# Patient Record
Sex: FEMALE | Race: OTHER | HISPANIC OR LATINO
[De-identification: names, ages, dates, MRNs, and addresses within clinical notes are randomized per-mention and may not be internally consistent; named-entity substitution may affect disease eponyms.]

---

## 2017-01-18 ENCOUNTER — APPOINTMENT (OUTPATIENT)
Dept: INTERNAL MEDICINE | Facility: CLINIC | Age: 77
End: 2017-01-18

## 2017-01-18 VITALS
TEMPERATURE: 99.4 F | HEART RATE: 83 BPM | DIASTOLIC BLOOD PRESSURE: 77 MMHG | WEIGHT: 157 LBS | BODY MASS INDEX: 32.81 KG/M2 | SYSTOLIC BLOOD PRESSURE: 115 MMHG

## 2017-01-19 ENCOUNTER — APPOINTMENT (OUTPATIENT)
Dept: RHEUMATOLOGY | Facility: CLINIC | Age: 77
End: 2017-01-19

## 2017-01-19 VITALS
BODY MASS INDEX: 32.95 KG/M2 | DIASTOLIC BLOOD PRESSURE: 72 MMHG | TEMPERATURE: 98.2 F | HEIGHT: 58 IN | HEART RATE: 88 BPM | WEIGHT: 157 LBS | SYSTOLIC BLOOD PRESSURE: 116 MMHG

## 2017-02-07 ENCOUNTER — APPOINTMENT (OUTPATIENT)
Age: 77
End: 2017-02-07

## 2017-02-07 VITALS
HEIGHT: 58 IN | TEMPERATURE: 98.3 F | WEIGHT: 153 LBS | BODY MASS INDEX: 32.12 KG/M2 | SYSTOLIC BLOOD PRESSURE: 122 MMHG | DIASTOLIC BLOOD PRESSURE: 82 MMHG | HEART RATE: 82 BPM

## 2017-02-21 ENCOUNTER — RX RENEWAL (OUTPATIENT)
Age: 77
End: 2017-02-21

## 2017-04-19 ENCOUNTER — APPOINTMENT (OUTPATIENT)
Dept: INTERNAL MEDICINE | Facility: CLINIC | Age: 77
End: 2017-04-19

## 2017-04-19 VITALS
TEMPERATURE: 98.8 F | DIASTOLIC BLOOD PRESSURE: 84 MMHG | HEART RATE: 80 BPM | WEIGHT: 156 LBS | SYSTOLIC BLOOD PRESSURE: 124 MMHG | BODY MASS INDEX: 32.6 KG/M2

## 2017-04-20 ENCOUNTER — APPOINTMENT (OUTPATIENT)
Dept: RHEUMATOLOGY | Facility: CLINIC | Age: 77
End: 2017-04-20

## 2017-04-20 VITALS
BODY MASS INDEX: 32.55 KG/M2 | WEIGHT: 155.05 LBS | RESPIRATION RATE: 16 BRPM | SYSTOLIC BLOOD PRESSURE: 120 MMHG | DIASTOLIC BLOOD PRESSURE: 84 MMHG | HEIGHT: 58 IN | HEART RATE: 78 BPM

## 2017-04-20 LAB
25(OH)D3 SERPL-MCNC: 30.2 NG/ML
ALBUMIN SERPL ELPH-MCNC: 4.3 G/DL
ALP BLD-CCNC: 73 U/L
ALT SERPL-CCNC: 21 U/L
ANION GAP SERPL CALC-SCNC: 19 MMOL/L
AST SERPL-CCNC: 21 U/L
BASOPHILS # BLD AUTO: 0.03 K/UL
BASOPHILS NFR BLD AUTO: 0.4 %
BILIRUB SERPL-MCNC: 0.3 MG/DL
BUN SERPL-MCNC: 19 MG/DL
CALCIUM SERPL-MCNC: 9.6 MG/DL
CHLORIDE SERPL-SCNC: 102 MMOL/L
CHOLEST SERPL-MCNC: 152 MG/DL
CHOLEST/HDLC SERPL: 2.9 RATIO
CO2 SERPL-SCNC: 23 MMOL/L
CREAT SERPL-MCNC: 0.68 MG/DL
EOSINOPHIL # BLD AUTO: 0.27 K/UL
EOSINOPHIL NFR BLD AUTO: 4 %
FOLATE SERPL-MCNC: 10 NG/ML
FRUCTOSAMINE SERPL-MCNC: 275 UMOL/L
GLUCOSE SERPL-MCNC: 88 MG/DL
HBA1C MFR BLD HPLC: 7.1 %
HCT VFR BLD CALC: 40.3 %
HDLC SERPL-MCNC: 53 MG/DL
HGB BLD-MCNC: 12.9 G/DL
IMM GRANULOCYTES NFR BLD AUTO: 0.1 %
LDLC SERPL CALC-MCNC: 70 MG/DL
LYMPHOCYTES # BLD AUTO: 1.56 K/UL
LYMPHOCYTES NFR BLD AUTO: 23.4 %
MAGNESIUM SERPL-MCNC: 1.6 MG/DL
MAN DIFF?: NORMAL
MCHC RBC-ENTMCNC: 28 PG
MCHC RBC-ENTMCNC: 32 GM/DL
MCV RBC AUTO: 87.4 FL
MONOCYTES # BLD AUTO: 0.48 K/UL
MONOCYTES NFR BLD AUTO: 7.2 %
NEUTROPHILS # BLD AUTO: 4.33 K/UL
NEUTROPHILS NFR BLD AUTO: 64.9 %
PLATELET # BLD AUTO: 213 K/UL
POTASSIUM SERPL-SCNC: 4.8 MMOL/L
PROT SERPL-MCNC: 7.4 G/DL
RBC # BLD: 4.61 M/UL
RBC # FLD: 13.1 %
SODIUM SERPL-SCNC: 144 MMOL/L
TRIGL SERPL-MCNC: 144 MG/DL
VIT B12 SERPL-MCNC: 466 PG/ML
WBC # FLD AUTO: 6.68 K/UL

## 2017-05-09 ENCOUNTER — APPOINTMENT (OUTPATIENT)
Age: 77
End: 2017-05-09

## 2017-05-09 VITALS
TEMPERATURE: 98.6 F | HEIGHT: 58 IN | WEIGHT: 157 LBS | RESPIRATION RATE: 16 BRPM | DIASTOLIC BLOOD PRESSURE: 78 MMHG | SYSTOLIC BLOOD PRESSURE: 120 MMHG | OXYGEN SATURATION: 95 % | BODY MASS INDEX: 32.95 KG/M2 | HEART RATE: 80 BPM

## 2017-06-06 ENCOUNTER — APPOINTMENT (OUTPATIENT)
Dept: RHEUMATOLOGY | Facility: CLINIC | Age: 77
End: 2017-06-06

## 2017-06-06 VITALS
TEMPERATURE: 98.7 F | HEART RATE: 77 BPM | DIASTOLIC BLOOD PRESSURE: 78 MMHG | OXYGEN SATURATION: 97 % | SYSTOLIC BLOOD PRESSURE: 128 MMHG | RESPIRATION RATE: 16 BRPM | HEIGHT: 58 IN | BODY MASS INDEX: 32.95 KG/M2 | WEIGHT: 157 LBS

## 2017-06-12 ENCOUNTER — APPOINTMENT (OUTPATIENT)
Dept: INTERNAL MEDICINE | Facility: CLINIC | Age: 77
End: 2017-06-12

## 2017-06-12 VITALS
DIASTOLIC BLOOD PRESSURE: 78 MMHG | HEART RATE: 87 BPM | HEIGHT: 58 IN | WEIGHT: 154 LBS | BODY MASS INDEX: 32.32 KG/M2 | TEMPERATURE: 98.3 F | SYSTOLIC BLOOD PRESSURE: 136 MMHG

## 2017-06-15 ENCOUNTER — RX RENEWAL (OUTPATIENT)
Age: 77
End: 2017-06-15

## 2017-08-15 ENCOUNTER — APPOINTMENT (OUTPATIENT)
Dept: RHEUMATOLOGY | Facility: CLINIC | Age: 77
End: 2017-08-15
Payer: MEDICAID

## 2017-08-15 VITALS
OXYGEN SATURATION: 98 % | BODY MASS INDEX: 33.17 KG/M2 | RESPIRATION RATE: 16 BRPM | DIASTOLIC BLOOD PRESSURE: 72 MMHG | WEIGHT: 158 LBS | HEIGHT: 58 IN | TEMPERATURE: 98.9 F | HEART RATE: 84 BPM | SYSTOLIC BLOOD PRESSURE: 142 MMHG

## 2017-08-15 PROCEDURE — 99214 OFFICE O/P EST MOD 30 MIN: CPT

## 2017-10-16 ENCOUNTER — APPOINTMENT (OUTPATIENT)
Dept: INTERNAL MEDICINE | Facility: CLINIC | Age: 77
End: 2017-10-16
Payer: MEDICAID

## 2017-10-16 VITALS
OXYGEN SATURATION: 98 % | DIASTOLIC BLOOD PRESSURE: 78 MMHG | BODY MASS INDEX: 32.54 KG/M2 | WEIGHT: 155 LBS | SYSTOLIC BLOOD PRESSURE: 122 MMHG | HEIGHT: 58 IN | HEART RATE: 82 BPM | TEMPERATURE: 98.8 F

## 2017-10-16 PROCEDURE — 99214 OFFICE O/P EST MOD 30 MIN: CPT

## 2017-10-16 RX ORDER — OMEPRAZOLE 10 MG/1
10 CAPSULE, DELAYED RELEASE ORAL
Qty: 30 | Refills: 1 | Status: COMPLETED | COMMUNITY
Start: 2017-01-18 | End: 2017-10-16

## 2017-10-16 RX ORDER — FAMOTIDINE 40 MG/1
40 TABLET, FILM COATED ORAL DAILY
Qty: 90 | Refills: 1 | Status: DISCONTINUED | COMMUNITY
Start: 2017-06-12 | End: 2017-10-16

## 2017-10-17 ENCOUNTER — APPOINTMENT (OUTPATIENT)
Dept: RHEUMATOLOGY | Facility: CLINIC | Age: 77
End: 2017-10-17
Payer: MEDICAID

## 2017-10-17 VITALS
TEMPERATURE: 98.9 F | BODY MASS INDEX: 32.12 KG/M2 | HEIGHT: 58 IN | HEART RATE: 83 BPM | OXYGEN SATURATION: 98 % | DIASTOLIC BLOOD PRESSURE: 68 MMHG | WEIGHT: 153 LBS | SYSTOLIC BLOOD PRESSURE: 124 MMHG | RESPIRATION RATE: 18 BRPM

## 2017-10-17 PROCEDURE — 99213 OFFICE O/P EST LOW 20 MIN: CPT

## 2017-12-13 ENCOUNTER — APPOINTMENT (OUTPATIENT)
Age: 77
End: 2017-12-13
Payer: MEDICAID

## 2017-12-13 VITALS
SYSTOLIC BLOOD PRESSURE: 130 MMHG | HEIGHT: 58 IN | TEMPERATURE: 98.6 F | HEART RATE: 85 BPM | DIASTOLIC BLOOD PRESSURE: 76 MMHG | WEIGHT: 154 LBS | BODY MASS INDEX: 32.32 KG/M2 | OXYGEN SATURATION: 98 %

## 2017-12-13 PROCEDURE — 99213 OFFICE O/P EST LOW 20 MIN: CPT

## 2017-12-14 LAB
ALBUMIN SERPL ELPH-MCNC: 4.5 G/DL
ALP BLD-CCNC: 91 U/L
ALT SERPL-CCNC: 19 U/L
AST SERPL-CCNC: 20 U/L
BILIRUB DIRECT SERPL-MCNC: 0.1 MG/DL
BILIRUB INDIRECT SERPL-MCNC: 0.3 MG/DL
BILIRUB SERPL-MCNC: 0.4 MG/DL
PROT SERPL-MCNC: 7.4 G/DL

## 2017-12-19 ENCOUNTER — APPOINTMENT (OUTPATIENT)
Dept: RHEUMATOLOGY | Facility: CLINIC | Age: 77
End: 2017-12-19
Payer: MEDICAID

## 2017-12-19 VITALS
RESPIRATION RATE: 16 BRPM | HEIGHT: 58 IN | SYSTOLIC BLOOD PRESSURE: 134 MMHG | HEART RATE: 72 BPM | DIASTOLIC BLOOD PRESSURE: 76 MMHG | WEIGHT: 155 LBS | BODY MASS INDEX: 32.54 KG/M2 | OXYGEN SATURATION: 98 % | TEMPERATURE: 97.6 F

## 2017-12-19 PROCEDURE — 99214 OFFICE O/P EST MOD 30 MIN: CPT

## 2018-01-08 ENCOUNTER — APPOINTMENT (OUTPATIENT)
Age: 78
End: 2018-01-08
Payer: MEDICAID

## 2018-01-08 PROCEDURE — 91200 LIVER ELASTOGRAPHY: CPT

## 2018-01-24 ENCOUNTER — APPOINTMENT (OUTPATIENT)
Dept: INTERNAL MEDICINE | Facility: CLINIC | Age: 78
End: 2018-01-24
Payer: MEDICAID

## 2018-01-24 VITALS
RESPIRATION RATE: 16 BRPM | TEMPERATURE: 98.2 F | DIASTOLIC BLOOD PRESSURE: 80 MMHG | HEART RATE: 98 BPM | OXYGEN SATURATION: 98 % | HEIGHT: 67 IN | WEIGHT: 158 LBS | SYSTOLIC BLOOD PRESSURE: 120 MMHG | BODY MASS INDEX: 24.8 KG/M2

## 2018-01-24 PROCEDURE — 99214 OFFICE O/P EST MOD 30 MIN: CPT

## 2018-03-14 ENCOUNTER — APPOINTMENT (OUTPATIENT)
Age: 78
End: 2018-03-14
Payer: MEDICAID

## 2018-03-14 VITALS
WEIGHT: 153 LBS | BODY MASS INDEX: 32.12 KG/M2 | DIASTOLIC BLOOD PRESSURE: 80 MMHG | RESPIRATION RATE: 16 BRPM | HEART RATE: 88 BPM | TEMPERATURE: 98.7 F | OXYGEN SATURATION: 98 % | HEIGHT: 58 IN | SYSTOLIC BLOOD PRESSURE: 132 MMHG

## 2018-03-14 LAB
BASOPHILS # BLD AUTO: 0.04 K/UL
BASOPHILS NFR BLD AUTO: 0.6 %
EOSINOPHIL # BLD AUTO: 0.23 K/UL
EOSINOPHIL NFR BLD AUTO: 3.3 %
HCT VFR BLD CALC: 43.5 %
HGB BLD-MCNC: 14.4 G/DL
IMM GRANULOCYTES NFR BLD AUTO: 0.3 %
LYMPHOCYTES # BLD AUTO: 1.99 K/UL
LYMPHOCYTES NFR BLD AUTO: 28.8 %
MAN DIFF?: NORMAL
MCHC RBC-ENTMCNC: 28.7 PG
MCHC RBC-ENTMCNC: 33.1 GM/DL
MCV RBC AUTO: 86.8 FL
MONOCYTES # BLD AUTO: 0.53 K/UL
MONOCYTES NFR BLD AUTO: 7.7 %
NEUTROPHILS # BLD AUTO: 4.1 K/UL
NEUTROPHILS NFR BLD AUTO: 59.3 %
PLATELET # BLD AUTO: 219 K/UL
RBC # BLD: 5.01 M/UL
RBC # FLD: 13.1 %
WBC # FLD AUTO: 6.91 K/UL

## 2018-03-14 PROCEDURE — 99214 OFFICE O/P EST MOD 30 MIN: CPT

## 2018-03-15 LAB
AFP-TM SERPL-MCNC: 2.5 NG/ML
ALBUMIN SERPL ELPH-MCNC: 4.7 G/DL
ALP BLD-CCNC: 106 U/L
ALT SERPL-CCNC: 19 U/L
ANION GAP SERPL CALC-SCNC: 16 MMOL/L
AST SERPL-CCNC: 18 U/L
BILIRUB SERPL-MCNC: 0.4 MG/DL
BUN SERPL-MCNC: 21 MG/DL
CALCIUM SERPL-MCNC: 10.5 MG/DL
CHLORIDE SERPL-SCNC: 100 MMOL/L
CO2 SERPL-SCNC: 28 MMOL/L
CREAT SERPL-MCNC: 0.77 MG/DL
DEPRECATED KAPPA LC FREE/LAMBDA SER: 0.96 RATIO
GLUCOSE SERPL-MCNC: 127 MG/DL
IGA SER QL IEP: 396 MG/DL
IGG SER QL IEP: 1010 MG/DL
IGM SER QL IEP: 34 MG/DL
KAPPA LC CSF-MCNC: 1.81 MG/DL
KAPPA LC SERPL-MCNC: 1.74 MG/DL
POTASSIUM SERPL-SCNC: 4.6 MMOL/L
PROT SERPL-MCNC: 7.7 G/DL
SODIUM SERPL-SCNC: 144 MMOL/L

## 2018-03-16 LAB
ANA PAT FLD IF-IMP: ABNORMAL
ANA SER IF-ACNC: ABNORMAL
SMOOTH MUSCLE AB SER QL IF: ABNORMAL

## 2018-03-20 ENCOUNTER — APPOINTMENT (OUTPATIENT)
Dept: RHEUMATOLOGY | Facility: CLINIC | Age: 78
End: 2018-03-20
Payer: MEDICAID

## 2018-03-20 VITALS
DIASTOLIC BLOOD PRESSURE: 76 MMHG | RESPIRATION RATE: 16 BRPM | SYSTOLIC BLOOD PRESSURE: 110 MMHG | HEIGHT: 58 IN | BODY MASS INDEX: 31.91 KG/M2 | WEIGHT: 152 LBS | HEART RATE: 69 BPM | TEMPERATURE: 98.6 F | OXYGEN SATURATION: 97 %

## 2018-03-20 DIAGNOSIS — M54.9 DORSALGIA, UNSPECIFIED: ICD-10-CM

## 2018-03-20 PROCEDURE — 99214 OFFICE O/P EST MOD 30 MIN: CPT

## 2018-05-22 ENCOUNTER — APPOINTMENT (OUTPATIENT)
Dept: RHEUMATOLOGY | Facility: CLINIC | Age: 78
End: 2018-05-22

## 2018-06-18 ENCOUNTER — RX RENEWAL (OUTPATIENT)
Age: 78
End: 2018-06-18

## 2018-07-05 ENCOUNTER — APPOINTMENT (OUTPATIENT)
Dept: RHEUMATOLOGY | Facility: CLINIC | Age: 78
End: 2018-07-05
Payer: MEDICAID

## 2018-07-05 VITALS
OXYGEN SATURATION: 96 % | WEIGHT: 152 LBS | SYSTOLIC BLOOD PRESSURE: 124 MMHG | RESPIRATION RATE: 16 BRPM | DIASTOLIC BLOOD PRESSURE: 80 MMHG | HEART RATE: 83 BPM | BODY MASS INDEX: 31.91 KG/M2 | HEIGHT: 58 IN

## 2018-07-05 PROCEDURE — 99214 OFFICE O/P EST MOD 30 MIN: CPT | Mod: 25

## 2018-07-05 PROCEDURE — 20605 DRAIN/INJ JOINT/BURSA W/O US: CPT | Mod: RT

## 2018-07-05 RX ORDER — LIDOCAINE HYDROCHLORIDE 10 MG/ML
1 INJECTION, SOLUTION INFILTRATION; PERINEURAL
Qty: 0 | Refills: 0 | Status: COMPLETED | OUTPATIENT
Start: 2018-07-05

## 2018-07-05 RX ORDER — METHYLPRED ACET/NACL,ISO-OS/PF 40 MG/ML
40 VIAL (ML) INJECTION
Qty: 1 | Refills: 0 | Status: COMPLETED | OUTPATIENT
Start: 2018-07-05

## 2018-07-05 RX ADMIN — Medication 0 %: at 00:00

## 2018-07-05 RX ADMIN — METHYLPREDNISOLONE ACETATE 0 MG/ML: 40 INJECTION, SUSPENSION INTRA-ARTICULAR; INTRALESIONAL; INTRAMUSCULAR; SOFT TISSUE at 00:00

## 2018-07-24 ENCOUNTER — RX RENEWAL (OUTPATIENT)
Age: 78
End: 2018-07-24

## 2018-07-24 ENCOUNTER — APPOINTMENT (OUTPATIENT)
Dept: INTERNAL MEDICINE | Facility: CLINIC | Age: 78
End: 2018-07-24
Payer: MEDICAID

## 2018-07-24 VITALS
BODY MASS INDEX: 32.19 KG/M2 | HEART RATE: 92 BPM | TEMPERATURE: 98.3 F | DIASTOLIC BLOOD PRESSURE: 74 MMHG | WEIGHT: 154 LBS | SYSTOLIC BLOOD PRESSURE: 138 MMHG | OXYGEN SATURATION: 96 %

## 2018-07-24 DIAGNOSIS — K64.8 OTHER HEMORRHOIDS: ICD-10-CM

## 2018-07-24 PROCEDURE — 99214 OFFICE O/P EST MOD 30 MIN: CPT | Mod: 25

## 2018-07-24 PROCEDURE — 82270 OCCULT BLOOD FECES: CPT

## 2018-08-01 ENCOUNTER — MOBILE ON CALL (OUTPATIENT)
Age: 78
End: 2018-08-01

## 2018-08-02 ENCOUNTER — RX RENEWAL (OUTPATIENT)
Age: 78
End: 2018-08-02

## 2018-08-13 ENCOUNTER — MOBILE ON CALL (OUTPATIENT)
Age: 78
End: 2018-08-13

## 2018-08-14 ENCOUNTER — APPOINTMENT (OUTPATIENT)
Dept: RHEUMATOLOGY | Facility: CLINIC | Age: 78
End: 2018-08-14
Payer: MEDICAID

## 2018-08-14 VITALS
SYSTOLIC BLOOD PRESSURE: 132 MMHG | TEMPERATURE: 98.6 F | HEART RATE: 82 BPM | BODY MASS INDEX: 39.25 KG/M2 | OXYGEN SATURATION: 98 % | RESPIRATION RATE: 16 BRPM | HEIGHT: 58 IN | WEIGHT: 187 LBS | DIASTOLIC BLOOD PRESSURE: 82 MMHG

## 2018-08-14 PROCEDURE — 20610 DRAIN/INJ JOINT/BURSA W/O US: CPT | Mod: RT

## 2018-08-14 PROCEDURE — 99213 OFFICE O/P EST LOW 20 MIN: CPT | Mod: 25

## 2018-08-21 ENCOUNTER — APPOINTMENT (OUTPATIENT)
Dept: RHEUMATOLOGY | Facility: CLINIC | Age: 78
End: 2018-08-21
Payer: MEDICAID

## 2018-08-21 VITALS
DIASTOLIC BLOOD PRESSURE: 75 MMHG | HEART RATE: 83 BPM | HEIGHT: 58 IN | RESPIRATION RATE: 16 BRPM | TEMPERATURE: 98.4 F | WEIGHT: 148 LBS | OXYGEN SATURATION: 98 % | BODY MASS INDEX: 31.07 KG/M2 | SYSTOLIC BLOOD PRESSURE: 116 MMHG

## 2018-08-21 DIAGNOSIS — S16.1XXA STRAIN OF MUSCLE, FASCIA AND TENDON AT NECK LEVEL, INITIAL ENCOUNTER: ICD-10-CM

## 2018-08-21 PROCEDURE — 99213 OFFICE O/P EST LOW 20 MIN: CPT

## 2018-09-04 ENCOUNTER — CHART COPY (OUTPATIENT)
Age: 78
End: 2018-09-04

## 2018-09-04 ENCOUNTER — APPOINTMENT (OUTPATIENT)
Dept: RHEUMATOLOGY | Facility: CLINIC | Age: 78
End: 2018-09-04
Payer: MEDICAID

## 2018-09-04 VITALS
BODY MASS INDEX: 30.64 KG/M2 | HEART RATE: 89 BPM | TEMPERATURE: 99.2 F | OXYGEN SATURATION: 96 % | DIASTOLIC BLOOD PRESSURE: 74 MMHG | WEIGHT: 146 LBS | SYSTOLIC BLOOD PRESSURE: 119 MMHG | RESPIRATION RATE: 16 BRPM | HEIGHT: 58 IN

## 2018-09-04 DIAGNOSIS — M14.60 CHARCOT'S JOINT, UNSPECIFIED SITE: ICD-10-CM

## 2018-09-04 PROCEDURE — 99214 OFFICE O/P EST MOD 30 MIN: CPT

## 2018-09-05 ENCOUNTER — APPOINTMENT (OUTPATIENT)
Dept: INTERNAL MEDICINE | Facility: CLINIC | Age: 78
End: 2018-09-05
Payer: MEDICAID

## 2018-09-05 VITALS
DIASTOLIC BLOOD PRESSURE: 78 MMHG | HEART RATE: 85 BPM | WEIGHT: 146 LBS | TEMPERATURE: 98 F | SYSTOLIC BLOOD PRESSURE: 130 MMHG | HEIGHT: 58 IN | BODY MASS INDEX: 30.64 KG/M2 | OXYGEN SATURATION: 98 %

## 2018-09-05 LAB
CREAT SPEC-SCNC: 90 MG/DL
FRUCTOSAMINE SERPL-MCNC: 323 UMOL/L
HBA1C MFR BLD HPLC: 9.2 %
MICROALBUMIN 24H UR DL<=1MG/L-MCNC: <1.2 MG/DL
MICROALBUMIN/CREAT 24H UR-RTO: NORMAL

## 2018-09-05 PROCEDURE — 99215 OFFICE O/P EST HI 40 MIN: CPT | Mod: 25

## 2018-09-05 PROCEDURE — 93000 ELECTROCARDIOGRAM COMPLETE: CPT

## 2018-09-05 RX ORDER — HYDROCORTISONE 2.5% 25 MG/G
2.5 CREAM TOPICAL 3 TIMES DAILY
Qty: 1 | Refills: 1 | Status: COMPLETED | COMMUNITY
Start: 2018-07-24 | End: 2018-09-05

## 2018-09-05 RX ORDER — LIDOCAINE 4 %
4 ADHESIVE PATCH, MEDICATED TOPICAL
Qty: 30 | Refills: 1 | Status: COMPLETED | COMMUNITY
Start: 2017-06-06 | End: 2018-09-05

## 2018-09-05 RX ORDER — CETIRIZINE HYDROCHLORIDE 10 MG/1
10 TABLET, COATED ORAL
Qty: 30 | Refills: 2 | Status: COMPLETED | COMMUNITY
Start: 2018-09-04 | End: 2018-09-05

## 2018-09-12 ENCOUNTER — APPOINTMENT (OUTPATIENT)
Dept: HEPATOLOGY | Facility: CLINIC | Age: 78
End: 2018-09-12
Payer: MEDICAID

## 2018-09-12 VITALS
OXYGEN SATURATION: 98 % | WEIGHT: 147 LBS | TEMPERATURE: 99.2 F | SYSTOLIC BLOOD PRESSURE: 151 MMHG | HEART RATE: 81 BPM | DIASTOLIC BLOOD PRESSURE: 81 MMHG | BODY MASS INDEX: 30.86 KG/M2 | RESPIRATION RATE: 16 BRPM | HEIGHT: 58 IN

## 2018-09-12 PROCEDURE — 99213 OFFICE O/P EST LOW 20 MIN: CPT

## 2018-09-13 ENCOUNTER — APPOINTMENT (OUTPATIENT)
Dept: RHEUMATOLOGY | Facility: CLINIC | Age: 78
End: 2018-09-13

## 2018-09-14 ENCOUNTER — APPOINTMENT (OUTPATIENT)
Dept: INTERNAL MEDICINE | Facility: HOSPITAL | Age: 78
End: 2018-09-14

## 2018-09-14 ENCOUNTER — OUTPATIENT (OUTPATIENT)
Dept: OUTPATIENT SERVICES | Facility: HOSPITAL | Age: 78
LOS: 1 days | End: 2018-09-14
Payer: MEDICAID

## 2018-09-14 ENCOUNTER — RESULT REVIEW (OUTPATIENT)
Age: 78
End: 2018-09-14

## 2018-09-14 DIAGNOSIS — K21.9 GASTRO-ESOPHAGEAL REFLUX DISEASE WITHOUT ESOPHAGITIS: ICD-10-CM

## 2018-09-14 DIAGNOSIS — K52.9 NONINFECTIVE GASTROENTERITIS AND COLITIS, UNSPECIFIED: ICD-10-CM

## 2018-09-14 DIAGNOSIS — Z90.710 ACQUIRED ABSENCE OF BOTH CERVIX AND UTERUS: Chronic | ICD-10-CM

## 2018-09-14 DIAGNOSIS — K59.00 CONSTIPATION, UNSPECIFIED: ICD-10-CM

## 2018-09-14 PROCEDURE — 88312 SPECIAL STAINS GROUP 1: CPT

## 2018-09-14 PROCEDURE — 43239 EGD BIOPSY SINGLE/MULTIPLE: CPT

## 2018-09-14 PROCEDURE — 45378 DIAGNOSTIC COLONOSCOPY: CPT

## 2018-09-14 PROCEDURE — 88312 SPECIAL STAINS GROUP 1: CPT | Mod: 26

## 2018-09-14 PROCEDURE — G0105: CPT

## 2018-09-14 PROCEDURE — 88305 TISSUE EXAM BY PATHOLOGIST: CPT | Mod: 26

## 2018-09-14 PROCEDURE — 88305 TISSUE EXAM BY PATHOLOGIST: CPT

## 2018-10-02 ENCOUNTER — APPOINTMENT (OUTPATIENT)
Dept: RHEUMATOLOGY | Facility: CLINIC | Age: 78
End: 2018-10-02
Payer: MEDICAID

## 2018-10-02 VITALS
HEART RATE: 90 BPM | DIASTOLIC BLOOD PRESSURE: 78 MMHG | RESPIRATION RATE: 16 BRPM | BODY MASS INDEX: 30.23 KG/M2 | TEMPERATURE: 98.4 F | HEIGHT: 58 IN | WEIGHT: 144 LBS | SYSTOLIC BLOOD PRESSURE: 113 MMHG | OXYGEN SATURATION: 98 %

## 2018-10-02 PROCEDURE — 99214 OFFICE O/P EST MOD 30 MIN: CPT | Mod: 25

## 2018-10-02 PROCEDURE — 20550 NJX 1 TENDON SHEATH/LIGAMENT: CPT | Mod: RT

## 2018-10-02 RX ORDER — METHYLPRED ACET/NACL,ISO-OS/PF 40 MG/ML
40 VIAL (ML) INJECTION
Qty: 1 | Refills: 0 | Status: COMPLETED | OUTPATIENT
Start: 2018-10-02

## 2018-10-02 RX ORDER — LIDOCAINE HYDROCHLORIDE 10 MG/ML
1 INJECTION, SOLUTION INFILTRATION; PERINEURAL
Qty: 0 | Refills: 0 | Status: COMPLETED | OUTPATIENT
Start: 2018-10-02

## 2018-10-02 RX ADMIN — Medication 0 MG/ML: at 00:00

## 2018-10-22 ENCOUNTER — RX RENEWAL (OUTPATIENT)
Age: 78
End: 2018-10-22

## 2018-11-07 ENCOUNTER — APPOINTMENT (OUTPATIENT)
Dept: RHEUMATOLOGY | Facility: CLINIC | Age: 78
End: 2018-11-07
Payer: MEDICAID

## 2018-11-07 VITALS
TEMPERATURE: 98.9 F | BODY MASS INDEX: 29.39 KG/M2 | RESPIRATION RATE: 16 BRPM | HEIGHT: 58 IN | OXYGEN SATURATION: 98 % | WEIGHT: 140 LBS | DIASTOLIC BLOOD PRESSURE: 86 MMHG | HEART RATE: 97 BPM | SYSTOLIC BLOOD PRESSURE: 121 MMHG

## 2018-11-07 PROCEDURE — 99214 OFFICE O/P EST MOD 30 MIN: CPT

## 2018-11-12 ENCOUNTER — APPOINTMENT (OUTPATIENT)
Dept: ORTHOPEDIC SURGERY | Facility: CLINIC | Age: 78
End: 2018-11-12
Payer: MEDICAID

## 2018-11-12 VITALS
HEART RATE: 88 BPM | DIASTOLIC BLOOD PRESSURE: 78 MMHG | HEIGHT: 58 IN | SYSTOLIC BLOOD PRESSURE: 118 MMHG | BODY MASS INDEX: 29.39 KG/M2 | WEIGHT: 140 LBS

## 2018-11-12 PROCEDURE — 99203 OFFICE O/P NEW LOW 30 MIN: CPT

## 2018-11-12 PROCEDURE — 73030 X-RAY EXAM OF SHOULDER: CPT | Mod: RT

## 2018-11-13 ENCOUNTER — APPOINTMENT (OUTPATIENT)
Dept: INTERNAL MEDICINE | Facility: CLINIC | Age: 78
End: 2018-11-13
Payer: MEDICAID

## 2018-11-13 VITALS
HEART RATE: 82 BPM | OXYGEN SATURATION: 98 % | DIASTOLIC BLOOD PRESSURE: 74 MMHG | HEIGHT: 58 IN | TEMPERATURE: 98.4 F | SYSTOLIC BLOOD PRESSURE: 122 MMHG | BODY MASS INDEX: 30.23 KG/M2 | WEIGHT: 144 LBS

## 2018-11-13 DIAGNOSIS — Z12.11 ENCOUNTER FOR SCREENING FOR MALIGNANT NEOPLASM OF COLON: ICD-10-CM

## 2018-11-13 PROCEDURE — 99214 OFFICE O/P EST MOD 30 MIN: CPT

## 2018-11-13 RX ORDER — POLYETHYLENE GLYCOL 3350 17 G/17G
17 POWDER, FOR SOLUTION ORAL
Qty: 1 | Refills: 0 | Status: COMPLETED | COMMUNITY
Start: 2018-09-05 | End: 2018-11-13

## 2018-11-13 RX ORDER — BISACODYL 5 MG/1
5 TABLET, COATED ORAL
Qty: 4 | Refills: 0 | Status: COMPLETED | COMMUNITY
Start: 2018-09-05 | End: 2018-11-13

## 2018-11-13 RX ORDER — WITCH HAZEL 50 %
50 PADS, MEDICATED (EA) TOPICAL
Qty: 1 | Refills: 2 | Status: COMPLETED | COMMUNITY
Start: 2018-07-24 | End: 2018-11-13

## 2018-11-16 ENCOUNTER — APPOINTMENT (OUTPATIENT)
Dept: ORTHOPEDIC SURGERY | Facility: CLINIC | Age: 78
End: 2018-11-16
Payer: MEDICAID

## 2018-11-16 PROCEDURE — 99213 OFFICE O/P EST LOW 20 MIN: CPT

## 2018-11-21 ENCOUNTER — APPOINTMENT (OUTPATIENT)
Dept: ORTHOPEDIC SURGERY | Facility: CLINIC | Age: 78
End: 2018-11-21
Payer: MEDICAID

## 2018-11-21 PROCEDURE — 99214 OFFICE O/P EST MOD 30 MIN: CPT

## 2018-11-23 LAB
ALBUMIN SERPL ELPH-MCNC: 3.8 G/DL
ALP BLD-CCNC: 80 U/L
ALT SERPL-CCNC: 10 U/L
AST SERPL-CCNC: 17 U/L
BILIRUB DIRECT SERPL-MCNC: 0.1 MG/DL
BILIRUB INDIRECT SERPL-MCNC: 0.2 MG/DL
BILIRUB SERPL-MCNC: 0.2 MG/DL
DEPRECATED KAPPA LC FREE/LAMBDA SER: 1.09 RATIO
IGA SER QL IEP: 410 MG/DL
IGG SER QL IEP: 1035 MG/DL
IGM SER QL IEP: 33 MG/DL
KAPPA LC CSF-MCNC: 1.94 MG/DL
KAPPA LC SERPL-MCNC: 2.11 MG/DL
PROT SERPL-MCNC: 7.2 G/DL

## 2018-12-05 ENCOUNTER — APPOINTMENT (OUTPATIENT)
Dept: CARDIOLOGY | Facility: CLINIC | Age: 78
End: 2018-12-05
Payer: SELF-PAY

## 2018-12-05 ENCOUNTER — APPOINTMENT (OUTPATIENT)
Dept: ORTHOPEDIC SURGERY | Facility: CLINIC | Age: 78
End: 2018-12-05
Payer: SELF-PAY

## 2018-12-05 VITALS
DIASTOLIC BLOOD PRESSURE: 69 MMHG | SYSTOLIC BLOOD PRESSURE: 133 MMHG | BODY MASS INDEX: 30.23 KG/M2 | HEART RATE: 93 BPM | OXYGEN SATURATION: 98 % | HEIGHT: 58 IN | TEMPERATURE: 98.1 F | WEIGHT: 144 LBS

## 2018-12-05 DIAGNOSIS — R01.1 CARDIAC MURMUR, UNSPECIFIED: ICD-10-CM

## 2018-12-05 PROCEDURE — 99213 OFFICE O/P EST LOW 20 MIN: CPT

## 2018-12-05 PROCEDURE — 99204 OFFICE O/P NEW MOD 45 MIN: CPT

## 2018-12-05 RX ORDER — DOCUSATE SODIUM 100 MG/1
100 CAPSULE, LIQUID FILLED ORAL TWICE DAILY
Qty: 50 | Refills: 0 | Status: COMPLETED | COMMUNITY
Start: 2018-12-05 | End: 2018-12-30

## 2018-12-05 NOTE — HISTORY OF PRESENT ILLNESS
[FreeTextEntry1] : 79 yo Khmer-speaking F with diverticulosis, DM, and HTN who presents for preprocedural evaluation prior to rotator cuff surgery. Patient reports she is able to go up a flight of stairs without any chest pain, dyspnea, palpitations, lightheadedness, or syncope. she denies any dyspnea, orthopnea, or PND. Reports compliance with medications, denies adverse effects. \par

## 2018-12-05 NOTE — ASSESSMENT
[FreeTextEntry1] : 77 yo F with DM and HTN who presents for preprocedural evaluation prior to rotator cuff surgery. She is euvolemic on exam, and able to perform > 4 METs of exertion without symptoms. \par \par 1. Murmur: Likely benign given absence of exertional symptoms or concerning EKG findings; will send for echocardiogram. \par \par 2. HTN: Adequately controlled on losartan\par \par 3. HLD: On simvastatin\par \par 4. Preprocedural evaluation: Patient's Revised Cardiac Risk Index (RCRI) score is 0 (0.4 % risk) and Molina score is %0.76 risk. Patient is at intermediate risk of  adverse perioperative cardiac events undergoing intermediate risk surgery. No further cardiac testing is needed prior to patient's surgery. Do not expect echo results to change above assessment. \par

## 2018-12-06 ENCOUNTER — OUTPATIENT (OUTPATIENT)
Dept: OUTPATIENT SERVICES | Facility: HOSPITAL | Age: 78
LOS: 1 days | End: 2018-12-06
Payer: MEDICAID

## 2018-12-06 VITALS
RESPIRATION RATE: 18 BRPM | HEIGHT: 60 IN | OXYGEN SATURATION: 100 % | DIASTOLIC BLOOD PRESSURE: 85 MMHG | SYSTOLIC BLOOD PRESSURE: 141 MMHG | WEIGHT: 143.08 LBS | HEART RATE: 90 BPM

## 2018-12-06 DIAGNOSIS — M75.21 BICIPITAL TENDINITIS, RIGHT SHOULDER: ICD-10-CM

## 2018-12-06 DIAGNOSIS — M75.01 ADHESIVE CAPSULITIS OF RIGHT SHOULDER: ICD-10-CM

## 2018-12-06 DIAGNOSIS — M67.919 UNSPECIFIED DISORDER OF SYNOVIUM AND TENDON, UNSPECIFIED SHOULDER: ICD-10-CM

## 2018-12-06 DIAGNOSIS — Z98.890 OTHER SPECIFIED POSTPROCEDURAL STATES: Chronic | ICD-10-CM

## 2018-12-06 DIAGNOSIS — Z98.41 CATARACT EXTRACTION STATUS, RIGHT EYE: Chronic | ICD-10-CM

## 2018-12-06 DIAGNOSIS — M75.121 COMPLETE ROTATOR CUFF TEAR OR RUPTURE OF RIGHT SHOULDER, NOT SPECIFIED AS TRAUMATIC: ICD-10-CM

## 2018-12-06 DIAGNOSIS — Z90.710 ACQUIRED ABSENCE OF BOTH CERVIX AND UTERUS: Chronic | ICD-10-CM

## 2018-12-06 DIAGNOSIS — Z01.818 ENCOUNTER FOR OTHER PREPROCEDURAL EXAMINATION: ICD-10-CM

## 2018-12-06 DIAGNOSIS — M19.019 PRIMARY OSTEOARTHRITIS, UNSPECIFIED SHOULDER: ICD-10-CM

## 2018-12-06 LAB
ANION GAP SERPL CALC-SCNC: 8 MMOL/L — SIGNIFICANT CHANGE UP (ref 5–17)
APTT BLD: 33 SEC — SIGNIFICANT CHANGE UP (ref 27.5–36.3)
BUN SERPL-MCNC: 19 MG/DL — HIGH (ref 7–18)
CALCIUM SERPL-MCNC: 9.2 MG/DL — SIGNIFICANT CHANGE UP (ref 8.4–10.5)
CHLORIDE SERPL-SCNC: 104 MMOL/L — SIGNIFICANT CHANGE UP (ref 96–108)
CO2 SERPL-SCNC: 28 MMOL/L — SIGNIFICANT CHANGE UP (ref 22–31)
CREAT SERPL-MCNC: 0.62 MG/DL — SIGNIFICANT CHANGE UP (ref 0.5–1.3)
GLUCOSE SERPL-MCNC: 130 MG/DL — HIGH (ref 70–99)
HCT VFR BLD CALC: 41 % — SIGNIFICANT CHANGE UP (ref 34.5–45)
HGB BLD-MCNC: 12.8 G/DL — SIGNIFICANT CHANGE UP (ref 11.5–15.5)
INR BLD: 1.19 RATIO — HIGH (ref 0.88–1.16)
MCHC RBC-ENTMCNC: 26.6 PG — LOW (ref 27–34)
MCHC RBC-ENTMCNC: 31.2 GM/DL — LOW (ref 32–36)
MCV RBC AUTO: 85.4 FL — SIGNIFICANT CHANGE UP (ref 80–100)
PLATELET # BLD AUTO: 293 K/UL — SIGNIFICANT CHANGE UP (ref 150–400)
POTASSIUM SERPL-MCNC: 4 MMOL/L — SIGNIFICANT CHANGE UP (ref 3.5–5.3)
POTASSIUM SERPL-SCNC: 4 MMOL/L — SIGNIFICANT CHANGE UP (ref 3.5–5.3)
PROTHROM AB SERPL-ACNC: 13.3 SEC — HIGH (ref 10–12.9)
RBC # BLD: 4.8 M/UL — SIGNIFICANT CHANGE UP (ref 3.8–5.2)
RBC # FLD: 13.5 % — SIGNIFICANT CHANGE UP (ref 10.3–14.5)
SODIUM SERPL-SCNC: 140 MMOL/L — SIGNIFICANT CHANGE UP (ref 135–145)
WBC # BLD: 7.2 K/UL — SIGNIFICANT CHANGE UP (ref 3.8–10.5)
WBC # FLD AUTO: 7.2 K/UL — SIGNIFICANT CHANGE UP (ref 3.8–10.5)

## 2018-12-06 PROCEDURE — 85730 THROMBOPLASTIN TIME PARTIAL: CPT

## 2018-12-06 PROCEDURE — 85610 PROTHROMBIN TIME: CPT

## 2018-12-06 PROCEDURE — 71045 X-RAY EXAM CHEST 1 VIEW: CPT

## 2018-12-06 PROCEDURE — G0463: CPT

## 2018-12-06 PROCEDURE — 80048 BASIC METABOLIC PNL TOTAL CA: CPT

## 2018-12-06 PROCEDURE — 93005 ELECTROCARDIOGRAM TRACING: CPT

## 2018-12-06 PROCEDURE — 85027 COMPLETE CBC AUTOMATED: CPT

## 2018-12-06 PROCEDURE — 71045 X-RAY EXAM CHEST 1 VIEW: CPT | Mod: 26

## 2018-12-06 PROCEDURE — 93010 ELECTROCARDIOGRAM REPORT: CPT

## 2018-12-06 NOTE — H&P PST ADULT - ASSESSMENT
77 y/o female with h/o HTN, and DM and right rotator cuff tear is scheduled for  Right shoulder arthroscopy and right shoulder cuff repair on 12/13/18.

## 2018-12-06 NOTE — H&P PST ADULT - NEGATIVE CARDIOVASCULAR SYMPTOMS
no chest pain/no palpitations/no dyspnea on exertion/no paroxysmal nocturnal dyspnea/no peripheral edema/no orthopnea

## 2018-12-06 NOTE — H&P PST ADULT - HISTORY OF PRESENT ILLNESS
77 y/o female presented with pain on the right shoulder for three months. I had Xray done for my neck and shoulder and an MRI and it shows that there is a torn tendon and I was taking pain medication Tylenol. Pt had also physical therapy and after 5 sessions, she was told that she need surgery. She is scheduled for Right shoulder arthroscopy and right shoulder cuff repair on 12/13/18

## 2018-12-06 NOTE — H&P PST ADULT - RS GEN PE MLT RESP DETAILS PC
good air movement/clear to auscultation bilaterally/normal/airway patent/breath sounds equal/respirations non-labored

## 2018-12-06 NOTE — H&P PST ADULT - PSH
H/O endoscopy    History of cataract surgery, right    S/P hysterectomy    Status post wrist surgery  Left

## 2018-12-06 NOTE — H&P PST ADULT - NSANTHOSAYNRD_GEN_A_CORE
No. MARILU screening performed.  STOP BANG Legend: 0-2 = LOW Risk; 3-4 = INTERMEDIATE Risk; 5-8 = HIGH Risk

## 2018-12-13 ENCOUNTER — APPOINTMENT (OUTPATIENT)
Dept: ORTHOPEDIC SURGERY | Facility: HOSPITAL | Age: 78
End: 2018-12-13

## 2018-12-13 ENCOUNTER — RX RENEWAL (OUTPATIENT)
Age: 78
End: 2018-12-13

## 2018-12-13 PROBLEM — M67.919 UNSPECIFIED DISORDER OF SYNOVIUM AND TENDON, UNSPECIFIED SHOULDER: Chronic | Status: ACTIVE | Noted: 2018-12-06

## 2018-12-13 PROBLEM — K29.70 GASTRITIS, UNSPECIFIED, WITHOUT BLEEDING: Chronic | Status: ACTIVE | Noted: 2018-12-06

## 2019-01-10 ENCOUNTER — APPOINTMENT (OUTPATIENT)
Dept: NEUROLOGY | Facility: CLINIC | Age: 79
End: 2019-01-10

## 2019-03-08 ENCOUNTER — APPOINTMENT (OUTPATIENT)
Dept: ORTHOPEDIC SURGERY | Facility: CLINIC | Age: 79
End: 2019-03-08
Payer: MEDICAID

## 2019-03-08 VITALS — HEART RATE: 99 BPM | SYSTOLIC BLOOD PRESSURE: 131 MMHG | DIASTOLIC BLOOD PRESSURE: 88 MMHG

## 2019-03-08 PROCEDURE — 99214 OFFICE O/P EST MOD 30 MIN: CPT

## 2019-03-08 PROCEDURE — 73030 X-RAY EXAM OF SHOULDER: CPT | Mod: RT

## 2019-03-08 NOTE — DISCUSSION/SUMMARY
[de-identified] : Right shoulder full-thickness rotator cuff tear with mild retraction\par A.c. joint arthrosis in MRI\par Biceps tendinitis\par Impingement syndrome\par \par We discussed at length the anatomy, function and tear pattern of the rotator cuff using models, diagrams and drawings. We reviewed the patient's physical exam, radiographic images and history. We discussed the expected outcome of non-operative conservative treatment versus arthroscopic operative rotator cuff repair. Non-operative management consists of patient education, activity modification, corticosteroid injection, PO or topical NSAIDs, and formal physical therapy. Partial thickness tears do have some limited healing potential but infrequently heal completely and with time sometimes progress towards a focal full thickness tear. Smaller tears without retraction are expected to progress and enlarge overtime to larger full thickness tears with retraction. Full thickness rotator cuff tears, in a vast majority of circumstances, do not heal without surgical treatment. The larger the tear becomes, the more difficult the repair is technically and protracts and slows rehabilitation. As the full thickness rotator cuff tear progresses over time, the torn tendon edge retracted due to intrinsic tendon changes to its strength and elasticity, which along with progressive rotator cuff muscle belly atrophy and fatty degeneration makes surgical management both less effective and more difficult. Given enough chronic tendon changes and degenerative muscle changes, the cuff may become unrepairable, necessitating larger, more costly, less predictable poorer outcome, reconstructive arthroscopic or open surgeries including a reverse shoulder replacement.\par \par Patient's failed nonoperative modalities of management of her shoulder pain.\par \par MRI was re-reviewed w a Ukrainian . discussed failure of pt, patient age and diabetic status w Hgb A1c 9.x in sept.\par \par I have discussed the treatment options with Ms. BENOIT . At this point she remains symptomatic despite trial of non-operative management. She is having persistent symptoms and has been unable to return to her usual and customary occupation. In light of the patient's complaints, my recommendation at this point is to proceed with surgical arthroscopy of the right shoulder with examination under anesthesia, arthroscopic subacromial decompression, possible mini- Vicky versus Vicky resection of the distal clavicle, careful evaluation of the long head of the biceps for any necessary debridement versus tenodesis/tenotomy, careful evaluation of the rotator cuff with debridement versus repair with possible augmentation and possible superior capsule reconstruction. I have explained the nature of the surgery with images, diagrams and models as well as the expected postoperative course including immobilization in a surgical sling and intensive physical therapy.. The patient is advised of the risks and benefits and alternatives of surgery. Risks include, but were not limited to future surgeries, infection, bleeding, damage to blood vessels and nerves, continued pain, inability to complete tear repair due to size and condition of the tear, failure of cuff repair to heal, re-tear of the rotator cuff, loss of motion, deep venous thrombosis/pulmonary embolus, acromial stress fracture, AC joint instability, nerve injury, iatrogenic injury to shoulder structures, positional complications including eye irritation and compression, complications due to anesthesia including myocardial infarction, stroke, death, etc.The Ms. BENOIT's questions were answered. She appeared to understand the risks, benefits, and alternatives of the surgical procedure. I advised that there are no guarantees as to outcome and that the ultimate improvement will be largely based on the extent of rotator cuff and long head biceps involvement and that she may not gain full strength nor full range motion of the shoulder\par \par Ms. BENOIT  gave verbal consent to proceed. written consent will be obtained on day of surgery\par \par will need medical clearance\par for arthroscopic surgery April 11, 3 hour lateral procedure. cardiac clearance\par \par discussed extensively the risks and benefits. risk factors include, age, size of tear, DM, stiffness, time from injury, prior injection. \par \par The patient verifies their understanding the the visit, diagnosis and plan. They agree with the treatment plan and will contact the office with any questions or problems.\par \par  Follow up\par 1 week preop

## 2019-03-08 NOTE — PHYSICAL EXAM
[de-identified] : Physical Examination\par General: well nourished, in no acute distress, alert and oriented to person, place and time\par Psychiatric: normal mood and affect, no abnormal movements or speech patterns\par Eyes: vision intact without glasses\par Throat: no thyromegaly\par Lymph: no enlarged nodes, no lymphedema in extremity\par Respiratory: no wheezing, no shortness of breath with ambulation\par Cardiac: no cardiac leg swelling, 2+ peripheral pulses\par Neurology: normal gross sensation in extremities to light touch\par Abdomen: soft, non-tender, tympanic, no masses\par \par Musculoskeletal Examination\par Cervical spine	Full painless range of motion and negative Spurling's test\par \par Shoulder			Right			Left\par Appearance\par      Skin/Swelling/Deformity	normal			normal\par      Scapular Winging		-			-\par Range of Motion\par      Forward Flexion		90 / 100		170 / 170\par      Abduction			80 / 100		170 / 170\par      External Rotation		25/45			45\par      Internal Rotation		post hip			T10\par      SAbd Ext Rotation		60			90\par      SAbd Int Rotation		50			80\par      Painful Arc			+			-\par      Crepitus			+			-\par Palpation\par      Clavicle			-			-\par      AC Joint			+			-\par      Posterior Acromion		-			-\par      Levator Scapula		-			-\par      Lateral Bursa			+			-\par      Impingement Area		+			-\par      Biceps Tendon		+			-\par      Anterior Capsule		-			-\par Strength Examination\par      Supraspinatous 		3+ / +			5+ / 0\par      Infraspinatous			3+ / +			5+ / 0\par      Subscapularis			5+ / +			5+ / 0\par      Belly Press			5+ / 0			5+ / 0\par      Lift Off			-&			-\par      Drop-Arm			+			-\par Special Examination\par      Biceps [place]'s		+			-\par      Impingement Neer		+			-\par      Impingement Hawking		+			-\par \par      AC Cross-Body\par           Anterior			+			-\par           Posterior			+			-\par \par Sensation\par      Axillary			normal			normal\par      LatAntCubBrach 		normal			normal\par      Median 			normal			normal\par      Ulnar 			normal			normal\par      Radial 			normal			normal\par Motor\par      AIN 				normal			normal\par      Ulnar 			normal			normal\par      Radial 			normal			normal\par      PIN 				normal			normal\par Pulses\par      Radial			2+			2+ [de-identified] : 4 views of the affected right shoulder (AP, Glenoid, Y-View, Axillary)\par demonstrate:\par normal bony calcification without dislocation and no fracture\par 	Arch	2B\par 	AC Joint	moderate Arthrosis with widened joints and small clavicular cysts\par 	GH Joint	trace Arthrosis\par 	Calcifications	none\par \par \par MRI shoulder dated 10-10-18 from [facility]\par My impression of the images:\par Quality of the MRI is ok\par Supraspinatous Tendon full thickness tear retracted to two thirds the distance between the footprint and the glenoid\par Infraspinatous Tendon full-thickness tear retracted to two thirds of the distance from the footprint of the glenoid\par Subscapularis Tendon ok\par Teres Minor Tendon ok\par Muscle Belly Atrophy moderate\par Biceps Tendon is  in the groove surrounded significant fluid and looks intra-articularly but poorly visualized with a stable attachment anchor\par Superior Labrum ok\par Anterior Labrum ok\par Posterior Labrum ok\par AC joint mild signal\par There is no full thickness chondral lesion of the glenoid and humeral head\par \par \par The Final Radiologist Impression:\par Complete full-thickness tear the supraspinatus tendon just proximal to its from for insertion with retraction of the torn free edge the tendon to the 1:00 position along the superior medial humeral head. There is mild associated atrophy of the supraspinatus muscle belly without significant fatty infiltration.\par \par Moderate infraspinatus and subscapularis tendinopathy.\par \par Mild subacromial subdeltoid bursitis.\par \par Mild to moderate long head of biceps tenosynovitis.\par \par Moderate acromioclavicular joint osteoarthritis\par \par \par 4 views of the affected right shoulder (AP, Glenoid, Y-View, Axillary)\par were ordered, obtained and evaluated by myself today and\par demonstrate:\par normal bony calcification without dislocation and no fracture\par 	Arch	2B\par 	AC Joint	moderate Arthrosis\par 	GH Joint	trace Arthrosis\par 	Calcifications	none\par

## 2019-03-13 ENCOUNTER — APPOINTMENT (OUTPATIENT)
Dept: HEPATOLOGY | Facility: CLINIC | Age: 79
End: 2019-03-13

## 2019-03-13 ENCOUNTER — APPOINTMENT (OUTPATIENT)
Dept: RHEUMATOLOGY | Facility: CLINIC | Age: 79
End: 2019-03-13
Payer: MEDICAID

## 2019-03-13 VITALS
HEART RATE: 72 BPM | DIASTOLIC BLOOD PRESSURE: 84 MMHG | SYSTOLIC BLOOD PRESSURE: 143 MMHG | HEIGHT: 58 IN | WEIGHT: 142 LBS | TEMPERATURE: 98.4 F | RESPIRATION RATE: 16 BRPM | BODY MASS INDEX: 29.81 KG/M2 | OXYGEN SATURATION: 99 %

## 2019-03-13 PROCEDURE — 99213 OFFICE O/P EST LOW 20 MIN: CPT | Mod: 25

## 2019-03-13 PROCEDURE — 20610 DRAIN/INJ JOINT/BURSA W/O US: CPT | Mod: LT

## 2019-03-13 RX ORDER — LIDOCAINE HYDROCHLORIDE 10 MG/ML
1 INJECTION, SOLUTION INFILTRATION; PERINEURAL
Qty: 0 | Refills: 0 | Status: COMPLETED | OUTPATIENT
Start: 2019-03-13

## 2019-03-13 RX ORDER — METHYLPRED ACET/NACL,ISO-OS/PF 40 MG/ML
40 VIAL (ML) INJECTION
Qty: 1 | Refills: 0 | Status: COMPLETED | OUTPATIENT
Start: 2019-03-13

## 2019-03-13 RX ADMIN — LIDOCAINE HYDROCHLORIDE 0 %: 10 INJECTION, SOLUTION EPIDURAL; INFILTRATION; INTRACAUDAL; PERINEURAL at 00:00

## 2019-03-13 RX ADMIN — METHYLPREDNISOLONE ACETATE 0 MG/ML: 40 INJECTION, SUSPENSION INTRA-ARTICULAR; INTRALESIONAL; INTRAMUSCULAR; SOFT TISSUE at 00:00

## 2019-03-15 NOTE — HISTORY OF PRESENT ILLNESS
[FreeTextEntry1] : Orthopedics recommendations and interventions appreciated. \par Patient reports delay in R shoulder arthroscopic secondary to insurance lapse.  She now notes surfacing of localized pain in the left shoulder.  She finds nonrestorative sleep secondary to pain and reports Tylenol offering minimal relief.  She explains difficulty in raising arm overhead.  She denies accompanied joint swelling or paresthesias.  She finds continued neck stiffness and explains performing cervical ROM exercises.  She otherwise denies motor disturbances or new systemic symptoms.

## 2019-03-15 NOTE — PROCEDURE
[Other Date:___] : Date: [unfilled] [Patient] : the patient [Risks] : risks [Benefits] : benefits [Consent Obtained] : written consent was obtained prior to the procedure and is detailed in the patient's record [Therapeutic] : therapeutic [#1 Site: ______] : #1 site identified in the [unfilled] [Ethyl Chloride] : ethyl chloride [25 gauge 1.5 inch] : A 25 gauge 1.5 inch needle was used [___ml 1% Lidocaine] : [unfilled] ml of 1% lidocaine [Depomedrol ___ mg] : Depomedrol [unfilled] mg [Tolerated Well] : the patient tolerated the procedure well [No Complications] : there were no complications [Patient Instructed to Call] : patient was instructed to call if redness at site, a decrease in range of motion or an increase in pain is noted after procedure.

## 2019-03-15 NOTE — ASSESSMENT
[FreeTextEntry1] : Patient with L SAB, cervical DJD; R RTC tear:\par Gabapentin rec for neuropathic pain. PT continued for the neck. Topical anti-inflammatory given for pain relief. PT, ROM and stretching exercises for bursitis.  Cortisone injection given for pain relief.  \par Recommend paraffin wax and glucosamine supplementation for hand OA. \par Currently, the  SHANTA positivity does not reflect an active inflammatory component underlying symptomatology. Patient will continue with lifestyle and exercise modification to address fatty liver and cardiovascular health. \par \par She is in agreement with the above plan and will return in two months' time.

## 2019-03-15 NOTE — PHYSICAL EXAM
[General Appearance - Alert] : alert [General Appearance - In No Acute Distress] : in no acute distress [Sclera] : the sclera and conjunctiva were normal [Examination Of The Oral Cavity] : the lips and gums were normal [Oropharynx] : the oropharynx was normal [Auscultation Breath Sounds / Voice Sounds] : lungs were clear to auscultation bilaterally [Heart Rate And Rhythm] : heart rate was normal and rhythm regular [Edema] : there was no peripheral edema [No Spinal Tenderness] : no spinal tenderness [Abnormal Walk] : normal gait [Musculoskeletal - Swelling] : no joint swelling seen [Motor Tone] : muscle strength and tone were normal [] : no rash [Motor Exam] : the motor exam was normal [Impaired Insight] : insight and judgment were intact [FreeTextEntry1] : Varicose veins over the LE

## 2019-03-15 NOTE — REVIEW OF SYSTEMS
[Arthralgias] : arthralgias [Joint Stiffness] : joint stiffness [Difficulty Walking] : difficulty walking [Fever] : no fever [Chills] : no chills [Eye Pain] : no eye pain [Sore Throat] : no sore throat [Hoarseness] : no hoarseness [Chest Pain] : no chest pain [Palpitations] : no palpitations [SOB on Exertion] : no shortness of breath during exertion [Dysuria] : no dysuria [Joint Swelling] : no joint swelling [Skin Lesions] : no skin lesions [Depression] : no depression [Muscle Weakness] : no muscle weakness [Feelings Of Weakness] : no feelings of weakness [Easy Bleeding] : no tendency for easy bleeding [Easy Bruising] : no tendency for easy bruising

## 2019-03-18 ENCOUNTER — APPOINTMENT (OUTPATIENT)
Dept: HEPATOLOGY | Facility: CLINIC | Age: 79
End: 2019-03-18
Payer: MEDICAID

## 2019-03-18 VITALS
SYSTOLIC BLOOD PRESSURE: 129 MMHG | HEIGHT: 58 IN | RESPIRATION RATE: 16 BRPM | TEMPERATURE: 98.3 F | BODY MASS INDEX: 29.18 KG/M2 | OXYGEN SATURATION: 92 % | WEIGHT: 139 LBS | DIASTOLIC BLOOD PRESSURE: 80 MMHG | HEART RATE: 72 BPM

## 2019-03-18 PROCEDURE — 99213 OFFICE O/P EST LOW 20 MIN: CPT

## 2019-04-01 ENCOUNTER — APPOINTMENT (OUTPATIENT)
Dept: ORTHOPEDIC SURGERY | Facility: CLINIC | Age: 79
End: 2019-04-01
Payer: MEDICAID

## 2019-04-01 PROCEDURE — 99214 OFFICE O/P EST MOD 30 MIN: CPT

## 2019-04-01 NOTE — PHYSICAL EXAM
[de-identified] : Physical Examination\par General: well nourished, in no acute distress, alert and oriented to person, place and time\par Psychiatric: normal mood and affect, no abnormal movements or speech patterns\par Eyes: vision intact without glasses\par Throat: no thyromegaly\par Lymph: no enlarged nodes, no lymphedema in extremity\par Respiratory: no wheezing, no shortness of breath with ambulation\par Cardiac: no cardiac leg swelling, 2+ peripheral pulses\par Neurology: normal gross sensation in extremities to light touch\par Abdomen: soft, non-tender, tympanic, no masses\par \par Musculoskeletal Examination\par Cervical spine	Full painless range of motion and negative Spurling's test\par \par Shoulder			Right			Left\par Appearance\par      Skin/Swelling/Deformity	normal			normal\par      Scapular Winging		-			-\par Range of Motion\par      Forward Flexion		90 / 140		170 / 170\par      Abduction			80 / 120		170 / 170\par      External Rotation		25/45			45\par      Internal Rotation		post hip			T10\par      SAbd Ext Rotation		60			90\par      SAbd Int Rotation		50			80\par      Painful Arc			+			-\par      Crepitus			+			-\par Palpation\par      Clavicle			-			-\par      AC Joint			+			-\par      Posterior Acromion		-			-\par      Levator Scapula		-			-\par      Lateral Bursa			+			-\par      Impingement Area		+			-\par      Biceps Tendon		+			-\par      Anterior Capsule		-			-\par Strength Examination\par      Supraspinatous 		3+ / +			5+ / 0\par      Infraspinatous			3+ / +			5+ / 0\par      Subscapularis			5+ / +			5+ / 0\par      Belly Press			5+ / 0			5+ / 0\par      Lift Off			-&			-\par      Drop-Arm			+			-\par Special Examination\par      Biceps [place]'s		+			-\par      Impingement Neer		+			-\par      Impingement Hawking		+			-\par \par      AC Cross-Body\par           Anterior			+			-\par           Posterior			+			-\par \par Sensation\par      Axillary			normal			normal\par      LatAntCubBrach 		normal			normal\par      Median 			normal			normal\par      Ulnar 			normal			normal\par      Radial 			normal			normal\par Motor\par      AIN 				normal			normal\par      Ulnar 			normal			normal\par      Radial 			normal			normal\par      PIN 				normal			normal\par Pulses\par      Radial			2+			2+ [de-identified] : 4 views of the affected right shoulder (AP, Glenoid, Y-View, Axillary)\par demonstrate:\par normal bony calcification without dislocation and no fracture\par 	Arch	2B\par 	AC Joint	moderate Arthrosis with widened joints and small clavicular cysts\par 	GH Joint	trace Arthrosis\par 	Calcifications	none\par \par \par MRI shoulder dated 10-10-18 from [facility]\par My impression of the images:\par Quality of the MRI is ok\par Supraspinatous Tendon full thickness tear retracted to two thirds the distance between the footprint and the glenoid\par Infraspinatous Tendon full-thickness tear retracted to two thirds of the distance from the footprint of the glenoid\par Subscapularis Tendon ok\par Teres Minor Tendon ok\par Muscle Belly Atrophy moderate\par Biceps Tendon is  in the groove surrounded significant fluid and looks intra-articularly but poorly visualized with a stable attachment anchor\par Superior Labrum ok\par Anterior Labrum ok\par Posterior Labrum ok\par AC joint mild signal\par There is no full thickness chondral lesion of the glenoid and humeral head\par \par \par The Final Radiologist Impression:\par Complete full-thickness tear the supraspinatus tendon just proximal to its from for insertion with retraction of the torn free edge the tendon to the 1:00 position along the superior medial humeral head. There is mild associated atrophy of the supraspinatus muscle belly without significant fatty infiltration.\par \par Moderate infraspinatus and subscapularis tendinopathy.\par \par Mild subacromial subdeltoid bursitis.\par \par Mild to moderate long head of biceps tenosynovitis.\par \par Moderate acromioclavicular joint osteoarthritis\par \par \par 4 views of the affected right shoulder (AP, Glenoid, Y-View, Axillary)\par 3-2019\par demonstrate:\par normal bony calcification without dislocation and no fracture\par 	Arch	2B\par 	AC Joint	moderate Arthrosis\par 	GH Joint	trace Arthrosis\par 	Calcifications	none\par

## 2019-04-01 NOTE — DISCUSSION/SUMMARY
[de-identified] : Right shoulder full-thickness rotator cuff tear with mild retraction\par A.c. joint arthrosis in MRI\par Biceps tendinitis\par Impingement syndrome\par \par We discussed at length the anatomy, function and tear pattern of the rotator cuff using models, diagrams and drawings. We reviewed the patient's physical exam, radiographic images and history. We discussed the expected outcome of non-operative conservative treatment versus arthroscopic operative rotator cuff repair. Non-operative management consists of patient education, activity modification, corticosteroid injection, PO or topical NSAIDs, and formal physical therapy. Partial thickness tears do have some limited healing potential but infrequently heal completely and with time sometimes progress towards a focal full thickness tear. Smaller tears without retraction are expected to progress and enlarge overtime to larger full thickness tears with retraction. Full thickness rotator cuff tears, in a vast majority of circumstances, do not heal without surgical treatment. The larger the tear becomes, the more difficult the repair is technically and protracts and slows rehabilitation. As the full thickness rotator cuff tear progresses over time, the torn tendon edge retracted due to intrinsic tendon changes to its strength and elasticity, which along with progressive rotator cuff muscle belly atrophy and fatty degeneration makes surgical management both less effective and more difficult. Given enough chronic tendon changes and degenerative muscle changes, the cuff may become unrepairable, necessitating larger, more costly, less predictable poorer outcome, reconstructive arthroscopic or open surgeries including a reverse shoulder replacement.\par \par Patient's failed nonoperative modalities of management of her shoulder pain.\par \par MRI was re-reviewed w a [place] . discussed failure of pt, patient age and diabetic status w Hgb A1c 9.x in sept.\par \par I have discussed the treatment options with Ms. BENOIT . At this point she remains symptomatic despite trial of non-operative management. She is having persistent symptoms and has been unable to return to her usual and customary occupation. In light of the patient's complaints, my recommendation at this point is to proceed with surgical arthroscopy of the right shoulder with examination under anesthesia, arthroscopic subacromial decompression, possible mini- Vicky versus Vicky resection of the distal clavicle, careful evaluation of the long head of the biceps for any necessary debridement versus tenodesis/tenotomy, careful evaluation of the rotator cuff with debridement versus repair with possible augmentation and possible superior capsule reconstruction. I have explained the nature of the surgery with images, diagrams and models as well as the expected postoperative course including immobilization in a surgical sling and intensive physical therapy.. The patient is advised of the risks and benefits and alternatives of surgery. Risks include, but were not limited to future surgeries, infection, bleeding, damage to blood vessels and nerves, continued pain, inability to complete tear repair due to size and condition of the tear, failure of cuff repair to heal, re-tear of the rotator cuff, loss of motion, deep venous thrombosis/pulmonary embolus, acromial stress fracture, AC joint instability, nerve injury, iatrogenic injury to shoulder structures, positional complications including eye irritation and compression, complications due to anesthesia including myocardial infarction, stroke, death, etc.The Ms. BENOIT's questions were answered. She appeared to understand the risks, benefits, and alternatives of the surgical procedure. I advised that there are no guarantees as to outcome and that the ultimate improvement will be largely based on the extent of rotator cuff and long head biceps involvement and that she may not gain full strength nor full range motion of the shoulder.\par \par Ms. BENOIT  gave verbal and written consent to proceed.\par \par will need medical clearance\par for arthroscopic surgery April 11, 3 hour lateral procedure. cardiac clearance\par \par discussed extensively the risks and benefits. risk factors include, age, size of tear, DM, stiffness, time from injury, prior injection.\par \par Ms. BENOIT denies personal or family history of DVT/blood clots, has been ambulatory, not taking OCP and does not smoke. No risk factors for DVT/PE, will prescribe [id number] daily for 4 weeks postoperatively beginning POD 1 except patient reports ulcers, no ASA will be provided\par \par She was  instructed on signs and symptoms of DVT/PE including lower leg swelling, calf cramp like pain, difficulty breaking, shortness of breath and chest pain. They will proceed immediately to the ER if any of these symptoms occur and contact me.\par \par She was prescribed a 3 day course of oral antibiotics to prevent against surgical infection. Instructed to start after returning home from surgery when able to tolerate PO food intake. This was prescribed to decrease the possibility of postop infection.\par \par For post operative pain control, the patient was given a prescription for percocet 5/325 1-2 every 4-6 hour to not exceed 4g tylenol per 24 hour period. I recommend taking medication as necessary postop for pain control. Taking NSAIDs such as Advil or Aleve is also ok with the narcotic pain medication. I did warn Ms. BENOIT against concomitant use of Tylenol since the narcotic pain medication includes Tylenol already, as taking both together can injure the liver.\par \par \par The patient verifies their understanding the the visit, diagnosis and plan. They agree with the treatment plan and will contact the office with any questions or problems.\par \par  Follow up\par 1 week postop

## 2019-04-01 NOTE — HISTORY OF PRESENT ILLNESS
[de-identified] : CC right shoulder\par \par HPI 79 yo female right HD presents for preop discussion for acute onset of 2 months of constant pain in the lateral right shoulder after lifting a heavy bag. The pain is same, and rated a 10 out of 10, described as pain, with radiation to the head. Rest makes the pain better and use of the shoulder makes the pain worse. The patient reports associated symptoms of weakness loss of motion. The patient reports severe pain at night affecting sleep, report neck pain, and + similar pain previously treated by therapy and corticosteroid injections by Dr. Susanna Bui.\par \par The patient has tried the following treatments:\par Activity modification	+\par Ice			+\par Nsaids    		+\par Physical Therapy  	+ minimal improvement\par Cortisone Injection	+ 2 shots minimal improvement\par Arthroscopy/Surgery	-\par \par \par Review of Systems is positive for the above musculoskeletal symptoms and is otherwise non-contributory for general, constitutional, psychiatric, neurologic, HEENT, cardiac, respiratory, gastrointestinal, reproductive, lymphatic, and dermatologic complaints.\par \par Consult by Dr Susanna Bui

## 2019-04-04 ENCOUNTER — OUTPATIENT (OUTPATIENT)
Dept: OUTPATIENT SERVICES | Facility: HOSPITAL | Age: 79
LOS: 1 days | End: 2019-04-04
Payer: MEDICAID

## 2019-04-04 VITALS
OXYGEN SATURATION: 96 % | SYSTOLIC BLOOD PRESSURE: 145 MMHG | TEMPERATURE: 97 F | HEIGHT: 58 IN | DIASTOLIC BLOOD PRESSURE: 92 MMHG | WEIGHT: 143.08 LBS | HEART RATE: 72 BPM | RESPIRATION RATE: 18 BRPM

## 2019-04-04 DIAGNOSIS — M75.121 COMPLETE ROTATOR CUFF TEAR OR RUPTURE OF RIGHT SHOULDER, NOT SPECIFIED AS TRAUMATIC: ICD-10-CM

## 2019-04-04 DIAGNOSIS — K76.0 FATTY (CHANGE OF) LIVER, NOT ELSEWHERE CLASSIFIED: ICD-10-CM

## 2019-04-04 DIAGNOSIS — E11.9 TYPE 2 DIABETES MELLITUS WITHOUT COMPLICATIONS: ICD-10-CM

## 2019-04-04 DIAGNOSIS — E78.5 HYPERLIPIDEMIA, UNSPECIFIED: ICD-10-CM

## 2019-04-04 DIAGNOSIS — Z90.710 ACQUIRED ABSENCE OF BOTH CERVIX AND UTERUS: Chronic | ICD-10-CM

## 2019-04-04 DIAGNOSIS — M75.01 ADHESIVE CAPSULITIS OF RIGHT SHOULDER: ICD-10-CM

## 2019-04-04 DIAGNOSIS — M75.21 BICIPITAL TENDINITIS, RIGHT SHOULDER: ICD-10-CM

## 2019-04-04 DIAGNOSIS — I10 ESSENTIAL (PRIMARY) HYPERTENSION: ICD-10-CM

## 2019-04-04 DIAGNOSIS — Z98.41 CATARACT EXTRACTION STATUS, RIGHT EYE: Chronic | ICD-10-CM

## 2019-04-04 DIAGNOSIS — Z98.890 OTHER SPECIFIED POSTPROCEDURAL STATES: Chronic | ICD-10-CM

## 2019-04-04 DIAGNOSIS — Z01.818 ENCOUNTER FOR OTHER PREPROCEDURAL EXAMINATION: ICD-10-CM

## 2019-04-04 DIAGNOSIS — M19.019 PRIMARY OSTEOARTHRITIS, UNSPECIFIED SHOULDER: ICD-10-CM

## 2019-04-04 PROCEDURE — G0463: CPT

## 2019-04-04 RX ORDER — SIMVASTATIN 20 MG/1
1 TABLET, FILM COATED ORAL
Qty: 0 | Refills: 0 | COMMUNITY

## 2019-04-04 RX ORDER — SODIUM CHLORIDE 9 MG/ML
3 INJECTION INTRAMUSCULAR; INTRAVENOUS; SUBCUTANEOUS EVERY 8 HOURS
Qty: 0 | Refills: 0 | Status: DISCONTINUED | OUTPATIENT
Start: 2019-04-11 | End: 2019-04-19

## 2019-04-04 NOTE — H&P PST ADULT - ASSESSMENT
78 year old female with PMHx of HTN, T2DM, HLD, Gastritis, non alcoholic fatty liver, heart murmur, rotator cuff disorder presents with right shoulder adhesive capsulitis, bicipital tendinitis, complete rotator cuff tear or rupture of right shoulder. Pt is scheduled right shoulder arthroscopy and rotator cuff repair with regeneron collagen patch, possible superior capsule reconstruction decompression, possible biceps tenodesis, possible tenotomy and distal clavicle excision on 4/11/2019

## 2019-04-04 NOTE — H&P PST ADULT - MUSCULOSKELETAL
details… detailed exam decreased ROM due to pain/due to  right shoulder pain decreased ROM due to pain/diminished strength/due to  right shoulder pain right shoulder/decreased ROM due to pain/diminished strength

## 2019-04-04 NOTE — H&P PST ADULT - NEGATIVE CARDIOVASCULAR SYMPTOMS
no chest pain/no palpitations no paroxysmal nocturnal dyspnea/no palpitations/no peripheral edema/no orthopnea/no claudication/no chest pain/no dyspnea on exertion

## 2019-04-04 NOTE — H&P PST ADULT - NSICDXPASTSURGICALHX_GEN_ALL_CORE_FT
PAST SURGICAL HISTORY:  H/O endoscopy     History of cataract surgery, right     S/P hysterectomy     Status post wrist surgery Left

## 2019-04-04 NOTE — H&P PST ADULT - NEGATIVE NEUROLOGICAL SYMPTOMS
no generalized seizures/no difficulty walking/no confusion/no transient paralysis/no weakness/no paresthesias

## 2019-04-04 NOTE — H&P PST ADULT - NEGATIVE GASTROINTESTINAL SYMPTOMS
no constipation/no nausea/no change in bowel habits/no vomiting/no diarrhea no abdominal pain/no change in bowel habits/no flatulence/no constipation/no nausea/no vomiting/no diarrhea

## 2019-04-04 NOTE — H&P PST ADULT - RS GEN PE MLT RESP DETAILS PC
good air movement/no rhonchi/no rales/airway patent respirations non-labored/normal/no rales/no rhonchi/no chest wall tenderness/good air movement/clear to auscultation bilaterally/airway patent

## 2019-04-04 NOTE — H&P PST ADULT - NSICDXPASTMEDICALHX_GEN_ALL_CORE_FT
PAST MEDICAL HISTORY:  Diabetes     Gastritis     Hypercholesterolemia     Hypertension     Rotator cuff disorder PAST MEDICAL HISTORY:  Diabetes     Gastritis     Hypercholesterolemia     Hypertension     Non-alcoholic fatty liver disease     Rotator cuff disorder

## 2019-04-04 NOTE — H&P PST ADULT - VENOUS THROMBOEMBOLISM CURRENT STATUS
(1) varicose veins (1) other risk factor (includes escalating BMI, pack-years of smoking, diabetes requiring insulin, chemotherapy, female gender and length of surgery)/(1) varicose veins

## 2019-04-04 NOTE — H&P PST ADULT - ACTIVITY
walking for few blocks, climbing up stairs walking for few blocks, go up a flight  of stairs without any chest pain or dyspnea

## 2019-04-04 NOTE — H&P PST ADULT - NSICDXPROBLEM_GEN_ALL_CORE_FT
PROBLEM DIAGNOSES  Problem: NAFLD (nonalcoholic fatty liver disease)  Assessment and Plan: Follow-up with provider for management.    Problem: Bicipital tendinitis, right shoulder  Assessment and Plan: Possible biceps tenodesis, possible tenotomy and distal clavicle excision on 4/11/2019    Problem: Adhesive capsulitis of right shoulder  Assessment and Plan: Possible superior capsule reconstruction decompression on 4/11/2019    Problem: Complete tear of right rotator cuff  Assessment and Plan: right shoulder arthroscopy and rotator cuff repairwith regeneron collagen patch on 4/11/2019    Problem: HLD (hyperlipidemia)  Assessment and Plan: Continue Simvastatin and follow-up with PCP for lipid management.    Problem: DM (diabetes mellitus)  Assessment and Plan: Continue meds and hold on day of surgery. Perioperative glucose monitoring and cover as needed. Follow-up with PCP postop for diabetic management.    Problem: HTN (hypertension)  Assessment and Plan: Instructed pt to take Losartan with sips of water and to follow-up with PCP postop for management.

## 2019-04-04 NOTE — H&P PST ADULT - NEGATIVE OPHTHALMOLOGIC SYMPTOMS
cataract surgery cataract surgery/no photophobia/no diplopia no photophobia/h/o cataract surgery/no diplopia

## 2019-04-04 NOTE — H&P PST ADULT - NEGATIVE RESPIRATORY AND THORAX SYMPTOMS
no dyspnea/no wheezing/no hemoptysis/no cough no dyspnea/no hemoptysis/no wheezing/no cough/no pleuritic chest pain

## 2019-04-04 NOTE — H&P PST ADULT - HISTORY OF PRESENT ILLNESS
78 year old female with PMHx of HTN, T2DM, HLD, Gastritis, rotator cuff disorder presents with c/o right shoulder pain for presurgical testing today for scheduled right shoulder arthroscopy and rotator cuff repair with regeneron collagen patch, possible superior capsule reconstruction decompression, possible biceps tenodesis, possible tenotomy and distal clavicle excision on 4/11/2019 78 year old female with PMHx of HTN, T2DM, HLD, Gastritis, non alcoholic fatty liver, heart murmur, rotator cuff disorder presents with c/o right shoulder pain for presurgical testing today for scheduled right shoulder arthroscopy and rotator cuff repair with regeneron collagen patch, possible superior capsule reconstruction decompression, possible biceps tenodesis, possible tenotomy and distal clavicle excision on 4/11/2019 78 year old female with PMHx of HTN, T2DM, HLD, Gastritis, non alcoholic fatty liver, heart murmur, rotator cuff disorder presents to presurgical testing today with c/o right shoulder pain for months. MRI revealed torn rotator cuff. Pt reports worsening of pain with arm movement. Pt is scheduled right shoulder arthroscopy and rotator cuff repair with regeneron collagen patch, possible superior capsule reconstruction decompression, possible biceps tenodesis, possible tenotomy and distal clavicle excision on 4/11/2019

## 2019-04-10 ENCOUNTER — TRANSCRIPTION ENCOUNTER (OUTPATIENT)
Age: 79
End: 2019-04-10

## 2019-04-10 RX ORDER — ACETAMINOPHEN 500 MG
975 TABLET ORAL ONCE
Qty: 0 | Refills: 0 | Status: DISCONTINUED | OUTPATIENT
Start: 2019-04-11 | End: 2019-04-19

## 2019-04-11 ENCOUNTER — APPOINTMENT (OUTPATIENT)
Dept: ORTHOPEDIC SURGERY | Facility: HOSPITAL | Age: 79
End: 2019-04-11

## 2019-04-11 ENCOUNTER — OUTPATIENT (OUTPATIENT)
Dept: OUTPATIENT SERVICES | Facility: HOSPITAL | Age: 79
LOS: 1 days | End: 2019-04-11
Payer: MEDICAID

## 2019-04-11 VITALS
OXYGEN SATURATION: 100 % | DIASTOLIC BLOOD PRESSURE: 92 MMHG | RESPIRATION RATE: 18 BRPM | TEMPERATURE: 97 F | SYSTOLIC BLOOD PRESSURE: 143 MMHG | HEART RATE: 62 BPM

## 2019-04-11 VITALS
RESPIRATION RATE: 14 BRPM | OXYGEN SATURATION: 97 % | DIASTOLIC BLOOD PRESSURE: 69 MMHG | HEIGHT: 58 IN | WEIGHT: 143.08 LBS | SYSTOLIC BLOOD PRESSURE: 139 MMHG | HEART RATE: 80 BPM | TEMPERATURE: 98 F

## 2019-04-11 DIAGNOSIS — M75.121 COMPLETE ROTATOR CUFF TEAR OR RUPTURE OF RIGHT SHOULDER, NOT SPECIFIED AS TRAUMATIC: ICD-10-CM

## 2019-04-11 DIAGNOSIS — Z90.710 ACQUIRED ABSENCE OF BOTH CERVIX AND UTERUS: Chronic | ICD-10-CM

## 2019-04-11 DIAGNOSIS — Z98.41 CATARACT EXTRACTION STATUS, RIGHT EYE: Chronic | ICD-10-CM

## 2019-04-11 DIAGNOSIS — M75.01 ADHESIVE CAPSULITIS OF RIGHT SHOULDER: ICD-10-CM

## 2019-04-11 DIAGNOSIS — Z98.890 OTHER SPECIFIED POSTPROCEDURAL STATES: Chronic | ICD-10-CM

## 2019-04-11 DIAGNOSIS — Z01.818 ENCOUNTER FOR OTHER PREPROCEDURAL EXAMINATION: ICD-10-CM

## 2019-04-11 DIAGNOSIS — M75.21 BICIPITAL TENDINITIS, RIGHT SHOULDER: ICD-10-CM

## 2019-04-11 DIAGNOSIS — M19.019 PRIMARY OSTEOARTHRITIS, UNSPECIFIED SHOULDER: ICD-10-CM

## 2019-04-11 LAB
ANION GAP SERPL CALC-SCNC: 5 MMOL/L — SIGNIFICANT CHANGE UP (ref 5–17)
BUN SERPL-MCNC: 18 MG/DL — SIGNIFICANT CHANGE UP (ref 7–18)
CALCIUM SERPL-MCNC: 8.8 MG/DL — SIGNIFICANT CHANGE UP (ref 8.4–10.5)
CHLORIDE SERPL-SCNC: 106 MMOL/L — SIGNIFICANT CHANGE UP (ref 96–108)
CO2 SERPL-SCNC: 28 MMOL/L — SIGNIFICANT CHANGE UP (ref 22–31)
CREAT SERPL-MCNC: 0.64 MG/DL — SIGNIFICANT CHANGE UP (ref 0.5–1.3)
GLUCOSE SERPL-MCNC: 146 MG/DL — HIGH (ref 70–99)
HCT VFR BLD CALC: 39.8 % — SIGNIFICANT CHANGE UP (ref 34.5–45)
HGB BLD-MCNC: 12.3 G/DL — SIGNIFICANT CHANGE UP (ref 11.5–15.5)
MCHC RBC-ENTMCNC: 26.6 PG — LOW (ref 27–34)
MCHC RBC-ENTMCNC: 30.9 GM/DL — LOW (ref 32–36)
MCV RBC AUTO: 86.1 FL — SIGNIFICANT CHANGE UP (ref 80–100)
NRBC # BLD: 0 /100 WBCS — SIGNIFICANT CHANGE UP (ref 0–0)
PLATELET # BLD AUTO: 214 K/UL — SIGNIFICANT CHANGE UP (ref 150–400)
POTASSIUM SERPL-MCNC: 4.2 MMOL/L — SIGNIFICANT CHANGE UP (ref 3.5–5.3)
POTASSIUM SERPL-SCNC: 4.2 MMOL/L — SIGNIFICANT CHANGE UP (ref 3.5–5.3)
RBC # BLD: 4.62 M/UL — SIGNIFICANT CHANGE UP (ref 3.8–5.2)
RBC # FLD: 13.9 % — SIGNIFICANT CHANGE UP (ref 10.3–14.5)
SODIUM SERPL-SCNC: 139 MMOL/L — SIGNIFICANT CHANGE UP (ref 135–145)
WBC # BLD: 6.27 K/UL — SIGNIFICANT CHANGE UP (ref 3.8–10.5)
WBC # FLD AUTO: 6.27 K/UL — SIGNIFICANT CHANGE UP (ref 3.8–10.5)

## 2019-04-11 PROCEDURE — 29827 SHO ARTHRS SRG RT8TR CUF RPR: CPT | Mod: 22

## 2019-04-11 PROCEDURE — 82962 GLUCOSE BLOOD TEST: CPT

## 2019-04-11 PROCEDURE — 85027 COMPLETE CBC AUTOMATED: CPT

## 2019-04-11 PROCEDURE — 80048 BASIC METABOLIC PNL TOTAL CA: CPT

## 2019-04-11 PROCEDURE — 36415 COLL VENOUS BLD VENIPUNCTURE: CPT

## 2019-04-11 PROCEDURE — C1713: CPT

## 2019-04-11 PROCEDURE — 29827 SHO ARTHRS SRG RT8TR CUF RPR: CPT | Mod: RT

## 2019-04-11 PROCEDURE — 29827 SHO ARTHRS SRG RT8TR CUF RPR: CPT | Mod: AS,22

## 2019-04-11 PROCEDURE — C1763: CPT

## 2019-04-11 RX ORDER — HYDROMORPHONE HYDROCHLORIDE 2 MG/ML
0.5 INJECTION INTRAMUSCULAR; INTRAVENOUS; SUBCUTANEOUS
Qty: 0 | Refills: 0 | Status: DISCONTINUED | OUTPATIENT
Start: 2019-04-11 | End: 2019-04-11

## 2019-04-11 RX ORDER — SODIUM CHLORIDE 9 MG/ML
1000 INJECTION, SOLUTION INTRAVENOUS
Qty: 0 | Refills: 0 | Status: DISCONTINUED | OUTPATIENT
Start: 2019-04-11 | End: 2019-04-19

## 2019-04-11 RX ORDER — SODIUM CHLORIDE 9 MG/ML
1000 INJECTION, SOLUTION INTRAVENOUS
Qty: 0 | Refills: 0 | Status: DISCONTINUED | OUTPATIENT
Start: 2019-04-11 | End: 2019-04-11

## 2019-04-11 RX ORDER — REPAGLINIDE 1 MG/1
1 TABLET ORAL
Qty: 0 | Refills: 0 | COMMUNITY

## 2019-04-11 RX ORDER — ONDANSETRON 8 MG/1
4 TABLET, FILM COATED ORAL ONCE
Qty: 0 | Refills: 0 | Status: COMPLETED | OUTPATIENT
Start: 2019-04-11 | End: 2019-04-11

## 2019-04-11 RX ORDER — FAMOTIDINE 10 MG/ML
1 INJECTION INTRAVENOUS
Qty: 0 | Refills: 0 | COMMUNITY

## 2019-04-11 RX ORDER — OXYCODONE AND ACETAMINOPHEN 5; 325 MG/1; MG/1
2 TABLET ORAL EVERY 6 HOURS
Qty: 0 | Refills: 0 | Status: DISCONTINUED | OUTPATIENT
Start: 2019-04-11 | End: 2019-04-11

## 2019-04-11 RX ORDER — METFORMIN HYDROCHLORIDE 850 MG/1
1 TABLET ORAL
Qty: 0 | Refills: 0 | COMMUNITY

## 2019-04-11 RX ORDER — ACETAMINOPHEN 500 MG
2 TABLET ORAL
Qty: 0 | Refills: 0 | COMMUNITY

## 2019-04-11 RX ORDER — SITAGLIPTIN 50 MG/1
1 TABLET, FILM COATED ORAL
Qty: 0 | Refills: 0 | COMMUNITY

## 2019-04-11 RX ORDER — OXYCODONE AND ACETAMINOPHEN 5; 325 MG/1; MG/1
1 TABLET ORAL EVERY 4 HOURS
Qty: 0 | Refills: 0 | Status: DISCONTINUED | OUTPATIENT
Start: 2019-04-11 | End: 2019-04-11

## 2019-04-11 RX ADMIN — HYDROMORPHONE HYDROCHLORIDE 0.5 MILLIGRAM(S): 2 INJECTION INTRAMUSCULAR; INTRAVENOUS; SUBCUTANEOUS at 15:40

## 2019-04-11 RX ADMIN — HYDROMORPHONE HYDROCHLORIDE 0.5 MILLIGRAM(S): 2 INJECTION INTRAMUSCULAR; INTRAVENOUS; SUBCUTANEOUS at 16:25

## 2019-04-11 RX ADMIN — HYDROMORPHONE HYDROCHLORIDE 0.5 MILLIGRAM(S): 2 INJECTION INTRAMUSCULAR; INTRAVENOUS; SUBCUTANEOUS at 15:55

## 2019-04-11 RX ADMIN — HYDROMORPHONE HYDROCHLORIDE 0.5 MILLIGRAM(S): 2 INJECTION INTRAMUSCULAR; INTRAVENOUS; SUBCUTANEOUS at 16:10

## 2019-04-11 RX ADMIN — ONDANSETRON 4 MILLIGRAM(S): 8 TABLET, FILM COATED ORAL at 17:57

## 2019-04-11 NOTE — BRIEF OPERATIVE NOTE - NSICDXBRIEFPREOP_GEN_ALL_CORE_FT
PRE-OP DIAGNOSIS:  Shoulder impingement syndrome, right 11-Apr-2019 18:57:55  Jacinta, Kelvin  Biceps tendonitis, right 11-Apr-2019 18:57:47  Jacinta, Kelvin  Complete tear of right rotator cuff 11-Apr-2019 18:57:36  Kelvin Workman

## 2019-04-11 NOTE — BRIEF OPERATIVE NOTE - NSICDXBRIEFPOSTOP_GEN_ALL_CORE_FT
POST-OP DIAGNOSIS:  Shoulder impingement syndrome, right 11-Apr-2019 18:58:28  Kelvin Workman  Biceps tendon tear 11-Apr-2019 18:58:20  Kelvin Workman  Complete tear of right rotator cuff 11-Apr-2019 18:58:08  Kelvin Workman

## 2019-04-11 NOTE — BRIEF OPERATIVE NOTE - NSICDXBRIEFPROCEDURE_GEN_ALL_CORE_FT
PROCEDURES:  Subacromial decompression 11-Apr-2019 18:57:23  Kelvin Workman  Repair, rotator cuff, arthroscopic 11-Apr-2019 18:57:09  Kelvin Workman

## 2019-04-11 NOTE — ASU DISCHARGE PLAN (ADULT/PEDIATRIC) - CALL YOUR DOCTOR IF YOU HAVE ANY OF THE FOLLOWING:
Swelling that gets worse/Numbness, tingling, color or temperature change to extremity Pain not relieved by Medications/Fever greater than (need to indicate Fahrenheit or Celsius)/Wound/Surgical Site with redness, or foul smelling discharge or pus/Bleeding that does not stop/Swelling that gets worse/Nausea and vomiting that does not stop/Numbness, tingling, color or temperature change to extremity

## 2019-04-11 NOTE — ASU PATIENT PROFILE, ADULT - PMH
Diabetes    Gastritis    Hypercholesterolemia    Hypertension    Non-alcoholic fatty liver disease    Rotator cuff disorder

## 2019-04-12 PROBLEM — K76.0 FATTY (CHANGE OF) LIVER, NOT ELSEWHERE CLASSIFIED: Chronic | Status: ACTIVE | Noted: 2019-04-04

## 2019-04-22 ENCOUNTER — APPOINTMENT (OUTPATIENT)
Dept: NEUROLOGY | Facility: CLINIC | Age: 79
End: 2019-04-22
Payer: MEDICAID

## 2019-04-22 ENCOUNTER — APPOINTMENT (OUTPATIENT)
Dept: ORTHOPEDIC SURGERY | Facility: CLINIC | Age: 79
End: 2019-04-22
Payer: MEDICAID

## 2019-04-22 VITALS
SYSTOLIC BLOOD PRESSURE: 124 MMHG | DIASTOLIC BLOOD PRESSURE: 67 MMHG | HEIGHT: 58 IN | HEART RATE: 81 BPM | WEIGHT: 143 LBS | OXYGEN SATURATION: 98 % | BODY MASS INDEX: 30.02 KG/M2

## 2019-04-22 PROCEDURE — 99024 POSTOP FOLLOW-UP VISIT: CPT

## 2019-04-22 PROCEDURE — 99205 OFFICE O/P NEW HI 60 MIN: CPT

## 2019-04-22 RX ORDER — GABAPENTIN 300 MG/1
300 CAPSULE ORAL TWICE DAILY
Qty: 60 | Refills: 1 | Status: DISCONTINUED | COMMUNITY
Start: 2018-09-04 | End: 2019-04-22

## 2019-04-22 RX ORDER — GABAPENTIN 100 MG/1
100 CAPSULE ORAL 3 TIMES DAILY
Qty: 90 | Refills: 2 | Status: DISCONTINUED | COMMUNITY
Start: 2017-01-19 | End: 2019-04-22

## 2019-04-22 NOTE — HISTORY OF PRESENT ILLNESS
[de-identified] : Patient is status post Right Shoulder Arthroscopic surgery on 4-11-19\par Two anchor rotator cuff repair with rotation medical patch\par      [ Subacromial decompression ]\par      \par \par \par The patient is doing well with improved pain postoperatively, is [ not ] taking narcotics for pain. The pain is currently less than it was prior to surgery.\par They have been doing postoperative icing, compressive dressing and exercises as directed with improving swelling, pain and motion.\par They have been complaint with postoperative sling immobilization\par They are taking ASA as directed for postoperative DVT ppx, Abx ppx completed\par \par The patient denies shortness of breath, chest pain, numbness tingling, worsening calf pain or swelling.\par \par Right Upper Ext\par \par Dressing intact\par Steri-Strips intact\par Incisions are intact, delayed healing in the lateral portal without erythema induration or warmth\par There is no erythema induration warmth or tenderness about the incisions\par There is mild swelling remaining [ and ecchymosis ]\par Shoulder pendulum motion is smooth\par Elbow ROM is stiff\par Motor is intact AIN/PIN/Ulnar/Radial\par Sensory intact median/ulnar/radial distributions\par Palpable radial pulse with brisk capillary refill\par \par \par \par Assessment Plan\par 2 weeks status post Right Shoulder Arthroscopic surgery on 4-11-19\par Two anchor rotator cuff repair with rotation medical patch\par      [ Subacromial decompression ]\par \par Steristrips removed and changed sterile\par withhold pendulums due to tenuous nature of cuff repair\par Continue postoperative exercises\par \par WB status RUE\par      Continue NWB for 6 weeks total\par      Progress 5lbs WB for weeks 6-12\par      Advance 15lbs WB for months 3-5\par      Activities as tolerated months 5+\par \par Hold physical therapy until [ 6 ] weeks\par \par Continue icing the shoulder for pain and swelling\par Continue shower, bathing w submersion of incision ok at 2 weeks\par \par Continue ASA DVT ppx for 1 month\par Abx ppx completed\par \par Surgery and arthroscopic images reviewed and provided for patient\par \par Follow up:\par 2 weeks at 4 weeks postop

## 2019-04-22 NOTE — REVIEW OF SYSTEMS
[As Noted in HPI] : as noted in HPI [Joint Pain] : joint pain [Fever] : no fever [Anxiety] : no anxiety [Eyesight Problems] : no eyesight problems [Loss Of Hearing] : no hearing loss [Chest Pain] : no chest pain [Shortness Of Breath] : no shortness of breath [Vomiting] : no vomiting [Itching] : no itching [Incontinence] : no incontinence [Easy Bleeding] : no tendency for easy bleeding [Muscle Weakness] : no muscle weakness

## 2019-04-22 NOTE — PHYSICAL EXAM
[General Appearance - Alert] : alert [General Appearance - In No Acute Distress] : in no acute distress [Person] : oriented to person [Place] : oriented to place [Time] : oriented to time [Registration Intact] : recent registration memory intact [Concentration Intact] : normal concentrating ability [Visual Intact] : visual attention was ~T not ~L decreased [Naming Objects] : no difficulty naming common objects [Repeating Phrases] : no difficulty repeating a phrase [Fluency] : fluency intact [Comprehension] : comprehension intact [Vocabulary] : adequate range of vocabulary [Cranial Nerves Optic (II)] : visual acuity intact bilaterally,  visual fields full to confrontation, pupils equal round and reactive to light [Cranial Nerves Oculomotor (III)] : extraocular motion intact [Cranial Nerves Trigeminal (V)] : facial sensation intact symmetrically [Cranial Nerves Facial (VII)] : face symmetrical [Cranial Nerves Vestibulocochlear (VIII)] : hearing was intact bilaterally [Cranial Nerves Glossopharyngeal (IX)] : tongue and palate midline [Cranial Nerves Accessory (XI - Cranial And Spinal)] : head turning and shoulder shrug symmetric [Cranial Nerves Hypoglossal (XII)] : there was no tongue deviation with protrusion [Motor Tone] : muscle tone was normal in all four extremities [Motor Strength] : muscle strength was normal in all four extremities [Involuntary Movements] : no involuntary movements were seen [Sensation Tactile Decrease] : light touch was intact [Abnormal Walk] : normal gait [Balance] : balance was intact [1+] : Ankle jerk left 1+ [Full Pulse] : the pedal pulses are present [Coordination - Dysmetria Impaired Finger-to-Nose Bilateral] : not present [Plantar Reflex Right Only] : normal on the right [Coordination - Dysmetria Impaired Heel-to-Shin Bilateral] : not present [Plantar Reflex Left Only] : normal on the left [FreeTextEntry5] : nasal turbulences normal, TM pearly

## 2019-04-22 NOTE — HISTORY OF PRESENT ILLNESS
[FreeTextEntry1] : The has history of vascular risk factors and is here for tinnitus going on for few months with associated dizziness at time.  The patient does not know which ear.  She was seen by ENT and was noted to have normal auditory exam as per the patient.  She however notes hearing loss and ear fullness at times.  Has been on ASA in  the past but has been stopped some time ago.  No headaches.  No double vision, slurred speech or difficulty with swallowing.  No vertigo, focal weakness or sensory loss.  No difficulty with balance.

## 2019-04-22 NOTE — ASSESSMENT
[FreeTextEntry1] : Tinnitus and dizziness in patient with reported normal auditory exam, will get MRI brain to r/o posterior circulation stroke/ posterior fossa mass.  Patient also has hearing loss at times and was on asa, should consider Menieres disease but the asa was stopped sometime ago.

## 2019-05-06 ENCOUNTER — APPOINTMENT (OUTPATIENT)
Dept: ORTHOPEDIC SURGERY | Facility: CLINIC | Age: 79
End: 2019-05-06
Payer: MEDICAID

## 2019-05-06 PROCEDURE — 99024 POSTOP FOLLOW-UP VISIT: CPT

## 2019-05-06 NOTE — HISTORY OF PRESENT ILLNESS
[de-identified] : Patient is status post Right Shoulder Arthroscopic surgery on 4-11-19\par Two anchor rotator cuff repair with rotation medical patch\par      [ Subacromial decompression ]\par      \par \par \par The patient is doing well with minimal pain postoperatively, is [ not ] taking narcotics for pain. The pain is currently less than it was prior to surgery, happy\par They have been doing postoperative icing, compressive dressing and exercises as directed with improving swelling, pain and motion.\par They have been complaint with postoperative sling immobilization with sling provided and alternative sling\par They are taking ASA as directed for postoperative DVT ppx, Abx ppx completed\par \par The patient denies shortness of breath, chest pain, numbness tingling, worsening calf pain or swelling.\par \par Right Upper Ext\par \par Dressing intact\par Steri-Strips intact\par Incisions are intact, delayed healing in the lateral portal without erythema induration or warmth\par There is no erythema induration warmth or tenderness about the incisions\par There is mild swelling remaining [ and ecchymosis ]\par Shoulder pendulum motion is slightly stiff\par Elbow ROM is smooth\par Motor is intact AIN/PIN/Ulnar/Radial\par Sensory intact median/ulnar/radial distributions\par Palpable radial pulse with brisk capillary refill\par \par \par \par Assessment Plan\par 2 weeks status post Right Shoulder Arthroscopic surgery on 4-11-19\par Two anchor rotator cuff repair with rotation medical patch\par      [ Subacromial decompression ]\par \par Continue postoperative exercises\par begin slow pendulums\par \par WB status RUE\par      Continue NWB for 6 weeks total\par      Progress 5lbs WB for weeks 6-12\par      Advance 15lbs WB for months 3-5\par      Activities as tolerated months 5+\par \par Hold physical therapy until [ 6 ] weeks\par \par Continue icing the shoulder for pain and swelling\par Continue shower, bathing w submersion of incision ok at 2 weeks\par \par Continue ASA DVT ppx for 1 month\par Abx ppx completed\par \par Follow up:\par 2 weeks at 6 weeks postop

## 2019-05-13 ENCOUNTER — APPOINTMENT (OUTPATIENT)
Dept: INTERNAL MEDICINE | Facility: CLINIC | Age: 79
End: 2019-05-13
Payer: MEDICAID

## 2019-05-13 VITALS
WEIGHT: 142 LBS | BODY MASS INDEX: 29.81 KG/M2 | SYSTOLIC BLOOD PRESSURE: 125 MMHG | OXYGEN SATURATION: 98 % | DIASTOLIC BLOOD PRESSURE: 78 MMHG | HEART RATE: 91 BPM | HEIGHT: 58 IN | TEMPERATURE: 98.2 F

## 2019-05-13 DIAGNOSIS — Z79.899 OTHER LONG TERM (CURRENT) DRUG THERAPY: ICD-10-CM

## 2019-05-13 PROCEDURE — 99214 OFFICE O/P EST MOD 30 MIN: CPT

## 2019-05-13 RX ORDER — CEPHALEXIN 500 MG/1
500 TABLET ORAL
Qty: 9 | Refills: 0 | Status: COMPLETED | COMMUNITY
Start: 2018-12-05 | End: 2019-05-13

## 2019-05-13 RX ORDER — FAMOTIDINE 40 MG/1
40 TABLET, FILM COATED ORAL DAILY
Qty: 90 | Refills: 1 | Status: COMPLETED | COMMUNITY
Start: 2018-11-13 | End: 2019-05-13

## 2019-05-13 RX ORDER — STANDARDIZED SENNA CONCENTRATE 8.6 MG/1
8.6 TABLET ORAL
Qty: 30 | Refills: 0 | Status: COMPLETED | COMMUNITY
Start: 2018-12-05 | End: 2019-05-13

## 2019-05-13 RX ORDER — OXYCODONE AND ACETAMINOPHEN 5; 325 MG/1; MG/1
5-325 TABLET ORAL
Qty: 33 | Refills: 0 | Status: COMPLETED | COMMUNITY
Start: 2018-12-05 | End: 2019-05-13

## 2019-05-13 RX ORDER — FAMOTIDINE 20 MG/1
20 TABLET, FILM COATED ORAL
Qty: 60 | Refills: 3 | Status: COMPLETED | COMMUNITY
Start: 2017-10-16 | End: 2019-05-13

## 2019-05-13 NOTE — HISTORY OF PRESENT ILLNESS
[Heartburn] : denies heartburn [Nausea] : denies nausea [Vomiting] : denies vomiting [Diarrhea] : denies diarrhea [Constipation] : denies constipation [Yellow Skin Or Eyes (Jaundice)] : denies jaundice [Abdominal Pain] : denies abdominal pain [Rectal Pain] : denies rectal pain [Abdominal Swelling] : abdominal swelling [GERD] : gastroesophageal reflux disease [de-identified] : Pt had surgery on her right shoulder and is doing well.  She has  been feeling well.  She doesn’t have acid reflux and no abdominal pain or diarrhea.  she has a bm one to two times a day.  She states her diabetes is better controlled and fu with her pcp.  She has not gone for diabetic educator or endocrinologist.  She has made an appt for next month for Dr mcguire.  She has gas but can not have lactose intolerance testing due to her diabetes and could try lactaid  prior to meals.

## 2019-05-13 NOTE — ASSESSMENT
[FreeTextEntry1] : constipation has resolved and is doing well.  Abd bloating and will take lactaid prior to meals 5 mins to determine if it improves. GERD improved and resolved will take if needed h2 blocker.  DM she has appt with her  endocrinologist and pcp Dr Morales.  She will fu with her pcp and return in 6 months for fu and sooner if needed.   She had colon polyps and fu should be in 5 yrs.  from 9/18

## 2019-05-13 NOTE — REASON FOR VISIT
[Follow-Up: _____] : a [unfilled] follow-up visit [FreeTextEntry1] : [id number]  [[employer] ] : provided by [employer]   [FreeTextEntry2] : krystian

## 2019-05-13 NOTE — PHYSICAL EXAM
[General Appearance - In No Acute Distress] : in no acute distress [General Appearance - Alert] : alert [PERRL With Normal Accommodation] : pupils were equal in size, round, and reactive to light [Oropharynx] : the oropharynx was normal [Auscultation Breath Sounds / Voice Sounds] : lungs were clear to auscultation bilaterally [Heart Rate And Rhythm] : heart rate was normal and rhythm regular [Heart Sounds] : normal S1 and S2 [Heart Sounds Gallop] : no gallops [Heart Sounds Pericardial Friction Rub] : no pericardial rub [Edema] : there was no peripheral edema [Normal] : normal [Soft, Nontender] : the abdomen was soft and nontender [No Mass] : no masses were palpated [No HSM] : no hepatosplenomegaly noted [No CVA Tenderness] : no ~M costovertebral angle tenderness [Abnormal Walk] : normal gait [Musculoskeletal - Swelling] : no joint swelling seen [Nail Clubbing] : no clubbing  or cyanosis of the fingernails [Motor Tone] : muscle strength and tone were normal [Skin Color & Pigmentation] : normal skin color and pigmentation [Skin Turgor] : normal skin turgor [Deep Tendon Reflexes (DTR)] : deep tendon reflexes were 2+ and symmetric [] : no rash [Sensation] : the sensory exam was normal to light touch and pinprick [No Focal Deficits] : no focal deficits [Impaired Insight] : insight and judgment were intact [Oriented To Time, Place, And Person] : oriented to person, place, and time [Affect] : the affect was normal

## 2019-05-20 ENCOUNTER — APPOINTMENT (OUTPATIENT)
Dept: ORTHOPEDIC SURGERY | Facility: CLINIC | Age: 79
End: 2019-05-20
Payer: MEDICAID

## 2019-05-20 PROCEDURE — 99024 POSTOP FOLLOW-UP VISIT: CPT

## 2019-05-20 NOTE — HISTORY OF PRESENT ILLNESS
[de-identified] : Patient is status post Right Shoulder Arthroscopic surgery on 4-11-19\par Two anchor rotator cuff repair with rotation medical patch\par      [ Subacromial decompression ]\par      \par \par \par The patient is doing well with minimal pain postoperatively, is [ not ] taking narcotics for pain. She is happy with pain improvement\par They have been doing postoperative icing, compressive dressing and exercises as directed with improving swelling, pain and motion.\par They have been complaint with postoperative sling immobilization with sling without abduction pillow\par They completed ASA as directed for postoperative DVT ppx, Abx ppx completed\par \par The patient denies shortness of breath, chest pain, numbness tingling, worsening calf pain or swelling.\par \par Right Upper Ext\par \par Incisions are intact, delayed healing in the lateral portal without erythema induration or warmth\par There is no erythema induration warmth or tenderness about the incisions\par There is resolved swelling and ecchymosis\par Shoulder pendulum motion is slightly stiff\par Elbow ROM is smooth\par Motor is intact AIN/PIN/Ulnar/Radial\par Sensory intact median/ulnar/radial distributions\par Palpable radial pulse with brisk capillary refill\par \par \par \par Assessment Plan\par 5 weeks status post Right Shoulder Arthroscopic surgery on 4-11-19\par Two anchor rotator cuff repair with rotation medical patch\par      [ Subacromial decompression ]\par \par Continue postoperative exercises\par begin slow pendulums\par \par WB status RUE\par      Continue NWB for 6 weeks total\par      Progress 5lbs WB for weeks 6-12\par      Advance 15lbs WB for months 3-5\par      Activities as tolerated months 5+\par \par Hold physical therapy until [ 6 ] weeks\par script provided\par \par sling for 6 weeks\par \par Follow up:\par 6 weeks

## 2019-05-21 ENCOUNTER — APPOINTMENT (OUTPATIENT)
Dept: RHEUMATOLOGY | Facility: CLINIC | Age: 79
End: 2019-05-21
Payer: MEDICAID

## 2019-05-21 VITALS
RESPIRATION RATE: 16 BRPM | SYSTOLIC BLOOD PRESSURE: 121 MMHG | BODY MASS INDEX: 29.39 KG/M2 | OXYGEN SATURATION: 97 % | DIASTOLIC BLOOD PRESSURE: 72 MMHG | TEMPERATURE: 98.8 F | WEIGHT: 140 LBS | HEART RATE: 77 BPM | HEIGHT: 58 IN

## 2019-05-21 PROCEDURE — 99214 OFFICE O/P EST MOD 30 MIN: CPT | Mod: 25

## 2019-05-21 PROCEDURE — 20610 DRAIN/INJ JOINT/BURSA W/O US: CPT | Mod: LT

## 2019-05-21 RX ORDER — LIDOCAINE HYDROCHLORIDE 10 MG/ML
1 INJECTION, SOLUTION INFILTRATION; PERINEURAL
Qty: 0 | Refills: 0 | Status: COMPLETED | OUTPATIENT
Start: 2019-05-21

## 2019-05-21 RX ORDER — METHYLPRED ACET/NACL,ISO-OS/PF 40 MG/ML
40 VIAL (ML) INJECTION
Qty: 1 | Refills: 0 | Status: COMPLETED | OUTPATIENT
Start: 2019-05-21

## 2019-05-21 RX ADMIN — METHYLPREDNISOLONE ACETATE 0 MG/ML: 40 INJECTION, SUSPENSION INTRA-ARTICULAR; INTRALESIONAL; INTRAMUSCULAR; SOFT TISSUE at 00:00

## 2019-05-21 RX ADMIN — LIDOCAINE HYDROCHLORIDE 0 %: 10 INJECTION, SOLUTION INFILTRATION; PERINEURAL at 00:00

## 2019-05-22 NOTE — PROCEDURE
[Other Date:___] : Date: [unfilled] [Patient] : the patient [Risks] : risks [Benefits] : benefits [Consent Obtained] : written consent was obtained prior to the procedure and is detailed in the patient's record [Therapeutic] : therapeutic [#1 Site: ______] : #1 site identified in the [unfilled] [Betadine] : betadine solution [25 gauge 1.5 inch] : A 25 gauge 1.5 inch needle was used [___ml 1% Lidocaine] : [unfilled] ml of 1% lidocaine [Depomedrol ___ mg] : Depomedrol [unfilled] mg [Tolerated Well] : the patient tolerated the procedure well [No Complications] : there were no complications [Patient Instructed to Call] : patient was instructed to call if redness at site, a decrease in range of motion or an increase in pain is noted after procedure.

## 2019-05-22 NOTE — ASSESSMENT
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   Topical anti-inflammatory given for pain relief. 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 Patient will continue with lifestyle and exercise modification to address fatty liver and cardiovascular health. \par She will continue on Calcium and VitD at the recommended doses of 1200mg and 800IU daily, respectively.  We reviewed calcium and VitD enriched foods as well.\par \par \par She is in agreement with the above plan and will return in one month' time.\par  \par

## 2019-05-22 NOTE — HISTORY OF PRESENT ILLNESS
[FreeTextEntry1] : Patient reports less pain, a reduction in pain by 50% in the r shoulder s/p arthroscopic repair for rotator cuff repair, however now finds resurfacing of L shoulder pain, having difficulty raising arm overhead.  She reports nonrestorative sleep secondary to pain.  She also explains pain in the R thumb resurfacing as she continues to be active.  She otherwise denies visual disturbances, oral ulcers, shortness of breath, chest pain, motor/sensory disturbances, Raynauds, rash or fever.\par

## 2019-06-13 ENCOUNTER — APPOINTMENT (OUTPATIENT)
Dept: NEUROLOGY | Facility: CLINIC | Age: 79
End: 2019-06-13
Payer: MEDICAID

## 2019-06-13 VITALS
HEIGHT: 58 IN | BODY MASS INDEX: 29.39 KG/M2 | DIASTOLIC BLOOD PRESSURE: 78 MMHG | SYSTOLIC BLOOD PRESSURE: 134 MMHG | OXYGEN SATURATION: 98 % | WEIGHT: 140 LBS | TEMPERATURE: 98.6 F | HEART RATE: 74 BPM

## 2019-06-13 DIAGNOSIS — H93.19 TINNITUS, UNSPECIFIED EAR: ICD-10-CM

## 2019-06-13 PROCEDURE — 99213 OFFICE O/P EST LOW 20 MIN: CPT

## 2019-06-13 NOTE — PHYSICAL EXAM
[General Appearance - Alert] : alert [General Appearance - In No Acute Distress] : in no acute distress [Person] : oriented to person [Place] : oriented to place [Time] : oriented to time [Registration Intact] : recent registration memory intact [Concentration Intact] : normal concentrating ability [Repeating Phrases] : no difficulty repeating a phrase [Fluency] : fluency intact [Comprehension] : comprehension intact [Vocabulary] : adequate range of vocabulary [Cranial Nerves Optic (II)] : visual acuity intact bilaterally,  visual fields full to confrontation, pupils equal round and reactive to light [Cranial Nerves Oculomotor (III)] : extraocular motion intact [Cranial Nerves Trigeminal (V)] : facial sensation intact symmetrically [Cranial Nerves Facial (VII)] : face symmetrical [Motor Tone] : muscle tone was normal in all four extremities [Motor Strength] : muscle strength was normal in all four extremities [Involuntary Movements] : no involuntary movements were seen [Sensation Tactile Decrease] : light touch was intact [Abnormal Walk] : normal gait [Balance] : balance was intact

## 2019-06-13 NOTE — ASSESSMENT
[FreeTextEntry1] : Tinnitus and dizziness in patient with reported normal auditory exam and unremarkable MRI brain. Patient also has hearing loss at times and was on asa, possibly due to Menieres disease but the asa was stopped sometime ago.  Patient will fu with ENT.\par \par rtc prn \par \par

## 2019-06-13 NOTE — HISTORY OF PRESENT ILLNESS
[FreeTextEntry1] : The has history of vascular risk factors and is here for tinnitus going on for few months with associated dizziness at time. The patient does not know which ear. She was seen by ENT and was noted to have normal auditory exam as per the patient. She however notes hearing loss and ear fullness at times. Has been on ASA in the past but has been stopped some time ago. No headaches. No double vision, slurred speech or difficulty with swallowing. No vertigo, focal weakness or sensory loss. No difficulty with balance. \par

## 2019-06-14 ENCOUNTER — APPOINTMENT (OUTPATIENT)
Dept: ENDOCRINOLOGY | Facility: CLINIC | Age: 79
End: 2019-06-14
Payer: MEDICAID

## 2019-06-14 VITALS
SYSTOLIC BLOOD PRESSURE: 126 MMHG | OXYGEN SATURATION: 99 % | HEIGHT: 58 IN | RESPIRATION RATE: 16 BRPM | DIASTOLIC BLOOD PRESSURE: 79 MMHG | WEIGHT: 144 LBS | HEART RATE: 77 BPM | BODY MASS INDEX: 30.23 KG/M2 | TEMPERATURE: 98.2 F

## 2019-06-14 DIAGNOSIS — Z86.39 PERSONAL HISTORY OF OTHER ENDOCRINE, NUTRITIONAL AND METABOLIC DISEASE: ICD-10-CM

## 2019-06-14 DIAGNOSIS — Z83.49 FAMILY HISTORY OF OTHER ENDOCRINE, NUTRITIONAL AND METABOLIC DISEASES: ICD-10-CM

## 2019-06-14 DIAGNOSIS — E11.65 TYPE 2 DIABETES MELLITUS WITH HYPERGLYCEMIA: ICD-10-CM

## 2019-06-14 LAB — GLUCOSE BLDC GLUCOMTR-MCNC: 95

## 2019-06-14 PROCEDURE — 99204 OFFICE O/P NEW MOD 45 MIN: CPT | Mod: 25

## 2019-06-14 PROCEDURE — 82962 GLUCOSE BLOOD TEST: CPT

## 2019-06-14 RX ORDER — DOCUSATE SODIUM 100 MG/1
100 CAPSULE ORAL TWICE DAILY
Qty: 1 | Refills: 0 | Status: DISCONTINUED | COMMUNITY
Start: 2018-07-24 | End: 2019-06-14

## 2019-06-14 RX ORDER — SITAGLIPTIN 25 MG/1
25 TABLET, FILM COATED ORAL DAILY
Qty: 30 | Refills: 0 | Status: DISCONTINUED | COMMUNITY
Start: 2018-11-13 | End: 2019-06-14

## 2019-06-14 RX ORDER — SIMETHICONE 125 MG/1
125 TABLET, CHEWABLE ORAL
Qty: 1 | Refills: 0 | Status: DISCONTINUED | COMMUNITY
Start: 2018-01-24 | End: 2019-06-14

## 2019-06-14 NOTE — ASSESSMENT
[FreeTextEntry1] : Patient's diabetes seems to be well controlled now (last year the HbA1c was elevated)\par Taking the medications regularly\par Advised to see the Nutritionist\par Advised to exercise regularly\par Patient was reminded of the importance of keeping the HbA1c <7% to prevent or delay the diabetic complications\par The hypertension is well controlled\par Will continue the same medications in the meantime\par Will order diabetic testing\par Patient will call back next week for results\par \par \par \par

## 2019-06-14 NOTE — HISTORY OF PRESENT ILLNESS
[FreeTextEntry1] : The patient comes to the office with a history of type 2 diabetes for the past 20 years. Recently at home her blood sugars have been well controlled they are usually around . She has been following the diet properly and exercising. She has gained about 4 lbs. Denies low blood glucose during the night. The blood glucose improves when she follows the diet and exercises. She is taking the medications regularly. Patient denies polydipsia, polyuria, chest pain, SOB or leg edema. She denies history of retinopathy, kidney problems. She denies history of numbness, tingling sensation on her extremities. She has hyperlipidemia, has history of hypertension, denies history of CHD, denies history of CVA.  She has seen the Ophthalmologist recently, she has seen the Podiatrist recently. She has seen the Cardiologist recently.\par

## 2019-06-14 NOTE — PHYSICAL EXAM
[Alert] : alert [No Acute Distress] : no acute distress [Normal Sclera/Conjunctiva] : normal sclera/conjunctiva [PERRL] : pupils equal, round and reactive to light [Normal Outer Ear/Nose] : the ears and nose were normal in appearance [Normal TMs] : both tympanic membranes were normal [No Neck Mass] : no neck mass was observed [No Respiratory Distress] : no respiratory distress [Thyroid Not Enlarged] : the thyroid was not enlarged [Normal PMI] : the apical impulse was normal [Clear to Auscultation] : lungs were clear to auscultation bilaterally [Normal Rate and Effort] : normal respiratory rhythm and effort [Normal Rate] : heart rate was normal  [Normal Gait] : normal gait [No Clubbing, Cyanosis] : no clubbing  or cyanosis of the fingernails [No Rash] : no rash [No Motor Deficits] : the motor exam was normal [No Sensory Deficits] : the sensory exam was normal to light touch and pinprick [Normal Sensation on Monofilament Testing] : normal sensation on monofilament testing of lower extremities

## 2019-06-16 LAB
ALBUMIN SERPL ELPH-MCNC: 4.4 G/DL
ALP BLD-CCNC: 109 U/L
ALT SERPL-CCNC: 14 U/L
ANION GAP SERPL CALC-SCNC: 14 MMOL/L
AST SERPL-CCNC: 13 U/L
BILIRUB DIRECT SERPL-MCNC: 0.1 MG/DL
BILIRUB INDIRECT SERPL-MCNC: 0.2 MG/DL
BILIRUB SERPL-MCNC: 0.3 MG/DL
BUN SERPL-MCNC: 22 MG/DL
C PEPTIDE SERPL-MCNC: 2.9 NG/ML
CALCIUM SERPL-MCNC: 9.6 MG/DL
CHLORIDE SERPL-SCNC: 103 MMOL/L
CHOLEST SERPL-MCNC: 149 MG/DL
CHOLEST/HDLC SERPL: 3 RATIO
CK SERPL-CCNC: 57 U/L
CO2 SERPL-SCNC: 24 MMOL/L
CREAT SERPL-MCNC: 0.63 MG/DL
CREAT SPEC-SCNC: 104 MG/DL
ESTIMATED AVERAGE GLUCOSE: 186 MG/DL
FRUCTOSAMINE SERPL-MCNC: 310 UMOL/L
GLUCOSE BS SERPL-MCNC: 158 MG/DL
GLUCOSE SERPL-MCNC: 161 MG/DL
HBA1C MFR BLD HPLC: 8.1 %
HDLC SERPL-MCNC: 49 MG/DL
LDLC SERPL CALC-MCNC: 74 MG/DL
MICROALBUMIN 24H UR DL<=1MG/L-MCNC: <1.2 MG/DL
MICROALBUMIN/CREAT 24H UR-RTO: NORMAL MG/G
POTASSIUM SERPL-SCNC: 4.8 MMOL/L
PROT SERPL-MCNC: 6.9 G/DL
SODIUM SERPL-SCNC: 141 MMOL/L
T4 FREE SERPL-MCNC: 1.1 NG/DL
TRIGL SERPL-MCNC: 131 MG/DL
TSH SERPL-ACNC: 2.64 UIU/ML

## 2019-06-18 LAB — THYROPEROXIDASE AB SERPL IA-ACNC: <10 IU/ML

## 2019-06-21 ENCOUNTER — APPOINTMENT (OUTPATIENT)
Dept: ENDOCRINOLOGY | Facility: CLINIC | Age: 79
End: 2019-06-21

## 2019-06-25 ENCOUNTER — APPOINTMENT (OUTPATIENT)
Dept: RHEUMATOLOGY | Facility: CLINIC | Age: 79
End: 2019-06-25
Payer: MEDICAID

## 2019-06-25 VITALS
DIASTOLIC BLOOD PRESSURE: 69 MMHG | HEART RATE: 75 BPM | SYSTOLIC BLOOD PRESSURE: 122 MMHG | TEMPERATURE: 98.4 F | OXYGEN SATURATION: 97 % | BODY MASS INDEX: 29.39 KG/M2 | RESPIRATION RATE: 16 BRPM | HEIGHT: 58 IN | WEIGHT: 140 LBS

## 2019-06-25 PROCEDURE — 99213 OFFICE O/P EST LOW 20 MIN: CPT

## 2019-06-27 NOTE — REASON FOR VISIT
[Follow-Up: _____] : a [unfilled] follow-up visit [facility] [id number] [FreeTextEntry2] : Lore

## 2019-06-27 NOTE — ASSESSMENT
[FreeTextEntry1] : Patient with cervical DJD  with supraspinatus tear s/p repair, with L bicipital tendinitis:\par \par PT continued for b/l shoulders.   Topical anti-inflammatory given for pain relief. PT, ROM and stretching exercises for tendinitis demonstrated.  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   Patient will continue with lifestyle and exercise modification to address fatty liver and cardiovascular health. \par She will continue on Calcium and VitD at the recommended doses of 1200mg and 800IU daily, respectively.  We reviewed calcium and VitD enriched foods as well.\par \par \par She is in agreement with the above plan and will return in one month' time.\par  \par

## 2019-06-27 NOTE — HISTORY OF PRESENT ILLNESS
[FreeTextEntry1] : Patient reports moderate improvement in pain after cortisone injection to the SAB in May , however explains residual stiffness over the side of the L shoulder, especially when lying on that side or lifting large objects.  She finds the pain can radiate to the elbow; she denies accompanying joint swelling.   \par She otherwise denies motor/sensory disturbances or fever.\par

## 2019-06-27 NOTE — PHYSICAL EXAM
[General Appearance - Alert] : alert [General Appearance - In No Acute Distress] : in no acute distress [Sclera] : the sclera and conjunctiva were normal [Examination Of The Oral Cavity] : the lips and gums were normal [Oropharynx] : the oropharynx was normal [Auscultation Breath Sounds / Voice Sounds] : lungs were clear to auscultation bilaterally [Heart Rate And Rhythm] : heart rate was normal and rhythm regular [Edema] : there was no peripheral edema [No Spinal Tenderness] : no spinal tenderness [Abnormal Walk] : normal gait [Motor Tone] : muscle strength and tone were normal [Musculoskeletal - Swelling] : no joint swelling seen [] : no rash [Impaired Insight] : insight and judgment were intact [Motor Exam] : the motor exam was normal [FreeTextEntry1] : Varicose veins over the LE

## 2019-07-01 ENCOUNTER — APPOINTMENT (OUTPATIENT)
Dept: ORTHOPEDIC SURGERY | Facility: CLINIC | Age: 79
End: 2019-07-01

## 2019-07-22 ENCOUNTER — APPOINTMENT (OUTPATIENT)
Dept: ORTHOPEDIC SURGERY | Facility: CLINIC | Age: 79
End: 2019-07-22
Payer: MEDICAID

## 2019-07-22 PROCEDURE — 99213 OFFICE O/P EST LOW 20 MIN: CPT

## 2019-07-22 NOTE — HISTORY OF PRESENT ILLNESS
[de-identified] : Patient is status post Right Shoulder Arthroscopic surgery on 4-11-19\par Two anchor rotator cuff repair with rotation medical patch\par  [ Subacromial decompression ]\par  \par \par \par The patient is doing well with minimal at rest. has mild pain w FF.\par \par The patient denies shortness of breath, chest pain, numbness tingling, worsening calf pain or swelling.\par \par Review of Systems is positive for the above musculoskeletal complaints as noted in the HPI above and is otherwise non-contributory for general, constitutional, psychiatric, neurologic, lymph/skin, HEENT, cardiac, respiratory, gastrointestinal, and reproductive systems. Please see intake form.\par

## 2019-07-22 NOTE — PHYSICAL EXAM
[de-identified] : Physical Examination\par General: well nourished, in no acute distress, alert and oriented to person, place and time\par Psychiatric: normal mood and affect, no abnormal movements or speech patterns\par Eyes: vision intact without glasses\par Throat: no thyromegaly\par Lymph: no enlarged nodes, no lymphedema in extremity\par Respiratory: no wheezing, no shortness of breath with ambulation\par Cardiac: no cardiac leg swelling, 2+ peripheral pulses\par Neurology: normal gross sensation in extremities to light touch\par Abdomen: soft, non-tender, tympanic, no masses\par \par Musculoskeletal Examination\par Cervical spine	Full painless range of motion and negative Spurling's test\par \par Shoulder			Right			Left\par Appearance\par      Skin/Swelling/Deformity	healed incisions			normal\par      Scapular Winging		-			-\par Range of Motion\par      Forward Flexion		140/140		170 / 170\par      Abduction			100 / 120		170 / 170\par      External Rotation		35/45			45\par      Internal Rotation		post hip			T10\par      SAbd Ext Rotation		60			90\par      SAbd Int Rotation		50			80\par      Painful Arc			+			-\par      Crepitus			+			-\par Palpation\par      Clavicle			-			-\par      AC Joint			+			-\par      Posterior Acromion		-			-\par      Levator Scapula		-			-\par      Lateral Bursa			+			-\par      Impingement Area		+			-\par      Biceps Tendon		+			-\par      Anterior Capsule		-			-\par Strength Examination\par      Supraspinatous 		3+ / 0			5+ / 0\par      Infraspinatous			3+ / 0			5+ / 0\par      Subscapularis			5+ / +			5+ / 0\par      Belly Press			5+ / 0			5+ / 0\par      Lift Off			-&			-\par      Drop-Arm			+			-\par Special Examination\par      Biceps Macoupin's		-&			-\par      Impingement Neer		-&			-\par      Impingement Hawking		-&			-\par \par      AC Cross-Body\par           Anterior			-&			-\par           Posterior			-&			-\par \par Sensation\par      Axillary			normal			normal\par      LatAntCubBrach 		normal			normal\par      Median 			normal			normal\par      Ulnar 			normal			normal\par      Radial 			normal			normal\par Motor\par      AIN 				normal			normal\par      Ulnar 			normal			normal\par      Radial 			normal			normal\par      PIN 				normal			normal\par Pulses\par      Radial			2+			2+ [de-identified] : 4 views of the affected right shoulder (AP, Glenoid, Y-View, Axillary)\par demonstrate:\par normal bony calcification without dislocation and no fracture\par 	Arch	2B\par 	AC Joint	moderate Arthrosis with widened joints and small clavicular cysts\par 	GH Joint	trace Arthrosis\par 	Calcifications	none\par \par \par MRI shoulder dated 10-10-18 from [facility]\par My impression of the images:\par Quality of the MRI is ok\par Supraspinatous Tendon full thickness tear retracted to two thirds the distance between the footprint and the glenoid\par Infraspinatous Tendon full-thickness tear retracted to two thirds of the distance from the footprint of the glenoid\par Subscapularis Tendon ok\par Teres Minor Tendon ok\par Muscle Belly Atrophy moderate\par Biceps Tendon is  in the groove surrounded significant fluid and looks intra-articularly but poorly visualized with a stable attachment anchor\par Superior Labrum ok\par Anterior Labrum ok\par Posterior Labrum ok\par AC joint mild signal\par There is no full thickness chondral lesion of the glenoid and humeral head\par \par \par The Final Radiologist Impression:\par Complete full-thickness tear the supraspinatus tendon just proximal to its from for insertion with retraction of the torn free edge the tendon to the 1:00 position along the superior medial humeral head. There is mild associated atrophy of the supraspinatus muscle belly without significant fatty infiltration.\par \par Moderate infraspinatus and subscapularis tendinopathy.\par \par Mild subacromial subdeltoid bursitis.\par \par Mild to moderate long head of biceps tenosynovitis.\par \par Moderate acromioclavicular joint osteoarthritis\par \par \par 4 views of the affected right shoulder (AP, Glenoid, Y-View, Axillary)\par 3-2019\par demonstrate:\par normal bony calcification without dislocation and no fracture\par 	Arch	2B\par 	AC Joint	moderate Arthrosis\par 	GH Joint	trace Arthrosis\par 	Calcifications	none\par

## 2019-07-22 NOTE — DISCUSSION/SUMMARY
[de-identified] : 3 mo status post Right Shoulder Arthroscopic surgery on 4-11-19\par Two anchor rotator cuff repair with rotation medical patch\par  [ Subacromial decompression ]\par \par  Advance 15lbs WB for months 3-5\par  Activities as tolerated months 5+\par \par continue physical therapy\par \par \par Follow up:\par 8 weeks.

## 2019-08-19 LAB
CRP SERPL-MCNC: 0.41 MG/DL
ERYTHROCYTE [SEDIMENTATION RATE] IN BLOOD BY WESTERGREN METHOD: 17 MM/HR
RHEUMATOID FACT SER QL: <10 IU/ML

## 2019-08-22 ENCOUNTER — APPOINTMENT (OUTPATIENT)
Dept: RHEUMATOLOGY | Facility: CLINIC | Age: 79
End: 2019-08-22
Payer: MEDICAID

## 2019-08-22 VITALS
SYSTOLIC BLOOD PRESSURE: 114 MMHG | RESPIRATION RATE: 16 BRPM | HEIGHT: 58 IN | OXYGEN SATURATION: 96 % | HEART RATE: 75 BPM | TEMPERATURE: 98.3 F | DIASTOLIC BLOOD PRESSURE: 77 MMHG | BODY MASS INDEX: 30.64 KG/M2 | WEIGHT: 146 LBS

## 2019-08-22 LAB
ANA PAT FLD IF-IMP: ABNORMAL
ANA SER IF-ACNC: ABNORMAL
CCP AB SER IA-ACNC: <8 UNITS
DSDNA AB SER-ACNC: 31 IU/ML
ENA RNP AB SER IA-ACNC: <0.2 AL
ENA SM AB SER IA-ACNC: <0.2 AL
ENA SS-A AB SER IA-ACNC: <0.2 AL
ENA SS-B AB SER IA-ACNC: <0.2 AL
RF+CCP IGG SER-IMP: NEGATIVE

## 2019-08-22 PROCEDURE — 99214 OFFICE O/P EST MOD 30 MIN: CPT

## 2019-08-23 NOTE — HISTORY OF PRESENT ILLNESS
[FreeTextEntry1] : Patient reports PT x 19 sessions for R shoulder s/p RTC repair.  She finds improved mobility, she is able to lift overhead.  She explains explains residual stiffness over the side of the L shoulder, especially when lying on that side or lifting large objects.   She finds the pain can radiate to the elbow; she denies accompanying joint swelling.  Tylenol addresses pain moderately.   Due to continued symptoms, serologies drawn revealing low titer +dsDNA Abs. Patient denies rash, photosensitivity or Raynaud's.  She is uptodate with health screening.  \par She otherwise denies motor/sensory disturbances or fever.\par

## 2019-08-23 NOTE — REVIEW OF SYSTEMS
[Arthralgias] : arthralgias [Joint Stiffness] : joint stiffness [Difficulty Walking] : difficulty walking [Chills] : no chills [Fever] : no fever [Sore Throat] : no sore throat [Eye Pain] : no eye pain [Palpitations] : no palpitations [Hoarseness] : no hoarseness [Chest Pain] : no chest pain [SOB on Exertion] : no shortness of breath during exertion [Dysuria] : no dysuria [Skin Lesions] : no skin lesions [Joint Swelling] : no joint swelling [Muscle Weakness] : no muscle weakness [Feelings Of Weakness] : no feelings of weakness [Depression] : no depression [Easy Bleeding] : no tendency for easy bleeding [Easy Bruising] : no tendency for easy bruising

## 2019-08-23 NOTE — ASSESSMENT
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   Topical anti-inflammatory given for pain relief. PT, ROM and stretching exercises for tendinitis demonstrated.  Recommend paraffin wax and glucosamine supplementation for hand OA.\par Explained importance of health screening in the setting of ds DNA Abs; due to migratory arthralgias, would like to trial with anti-malarial therapy such as Hydroxychloroquine (HCQ) to address migratory arthralgias before considering disease modifying agents such as MTX.   She understands the importance of yearly Opthalmology screens for visual screen testing to assess for retinal toxicity.  She understands the importance of sun avoidance and the application of SPF protection as well as the use of wide brim hats.\par Patient will continue with lifestyle and exercise modification to address fatty liver and cardiovascular health. \par She will continue on Calcium and VitD at the recommended doses of 1200mg and 800IU daily, respectively.  We reviewed calcium and VitD enriched foods as well.\par \par \par She is in agreement with the above plan and will return in one month' time.\par  \par

## 2019-08-23 NOTE — PHYSICAL EXAM
[General Appearance - Alert] : alert [General Appearance - In No Acute Distress] : in no acute distress [Examination Of The Oral Cavity] : the lips and gums were normal [Sclera] : the sclera and conjunctiva were normal [Oropharynx] : the oropharynx was normal [Auscultation Breath Sounds / Voice Sounds] : lungs were clear to auscultation bilaterally [Heart Rate And Rhythm] : heart rate was normal and rhythm regular [Edema] : there was no peripheral edema [No Spinal Tenderness] : no spinal tenderness [Musculoskeletal - Swelling] : no joint swelling seen [Abnormal Walk] : normal gait [Motor Tone] : muscle strength and tone were normal [] : no rash [Motor Exam] : the motor exam was normal [Impaired Insight] : insight and judgment were intact [FreeTextEntry1] : Varicose veins over the LE

## 2019-08-23 NOTE — REASON FOR VISIT
[Follow-Up: _____] : a [unfilled] follow-up visit [[employer] ] : provided by [employer]   [FreeTextEntry2] : Zoey [FreeTextEntry1] : [id number]

## 2019-09-09 ENCOUNTER — APPOINTMENT (OUTPATIENT)
Dept: HEPATOLOGY | Facility: CLINIC | Age: 79
End: 2019-09-09

## 2019-09-16 ENCOUNTER — APPOINTMENT (OUTPATIENT)
Dept: ORTHOPEDIC SURGERY | Facility: CLINIC | Age: 79
End: 2019-09-16

## 2019-09-23 ENCOUNTER — APPOINTMENT (OUTPATIENT)
Dept: ORTHOPEDIC SURGERY | Facility: CLINIC | Age: 79
End: 2019-09-23
Payer: MEDICAID

## 2019-09-23 DIAGNOSIS — M75.01 ADHESIVE CAPSULITIS OF RIGHT SHOULDER: ICD-10-CM

## 2019-09-23 PROCEDURE — 99213 OFFICE O/P EST LOW 20 MIN: CPT

## 2019-09-23 NOTE — HISTORY OF PRESENT ILLNESS
[de-identified] : Patient is status post Right Shoulder Arthroscopic surgery on 4-11-19\par Two anchor rotator cuff repair with rotation medical patch\par  [ Subacromial decompression ]\par  \par The patient is doing well with minimal at rest. has mild pinching pain w FF. stiff feels stiff but improving\par \par The patient denies shortness of breath, chest pain, numbness tingling, worsening calf pain or swelling.\par \par Review of Systems is positive for the above musculoskeletal complaints as noted in the HPI above and is otherwise non-contributory for general, constitutional, psychiatric, neurologic, lymph/skin, HEENT, cardiac, respiratory, gastrointestinal, and reproductive systems. Please see intake form.\par

## 2019-09-23 NOTE — PHYSICAL EXAM
[de-identified] : Physical Examination\par General: well nourished, in no acute distress, alert and oriented to person, place and time\par Psychiatric: normal mood and affect, no abnormal movements or speech patterns\par Eyes: vision intact without glasses\par Throat: no thyromegaly\par Lymph: no enlarged nodes, no lymphedema in extremity\par Respiratory: no wheezing, no shortness of breath with ambulation\par Cardiac: no cardiac leg swelling, 2+ peripheral pulses\par Neurology: normal gross sensation in extremities to light touch\par Abdomen: soft, non-tender, tympanic, no masses\par \par Musculoskeletal Examination\par Cervical spine	Full painless range of motion and negative Spurling's test\par \par Shoulder			Right			Left\par Appearance\par      Skin/Swelling/Deformity	healed incisions			normal\par      Scapular Winging		-			-\par Range of Motion\par      Forward Flexion		150/150		170 / 170\par      Abduction			120 / 120		170 / 170\par      External Rotation		40/45			45\par      Internal Rotation		L5			T10\par      SAbd Ext Rotation		60			90\par      SAbd Int Rotation		50			80\par      Painful Arc			+			-\par      Crepitus			-			-\par Palpation\par      Clavicle			-			-\par      AC Joint			-			-\par      Posterior Acromion		-			-\par      Levator Scapula		-			-\par      Lateral Bursa			-			-\par      Impingement Area		-			-\par      Biceps Tendon		-			-\par      Anterior Capsule		-			-\par Strength Examination\par      Supraspinatous 		3+ / 0			5+ / 0\par      Infraspinatous			5- / 0			5+ / 0\par      Subscapularis			5+ / 0			5+ / 0\par      Belly Press			5+ / 0			5+ / 0\par      Lift Off			-			-\par      Drop-Arm			-			-\par Special Examination\par      Biceps Mayes's		-			-\par      Impingement Neer		-			-\par      Impingement Hawking		-			-\par \par Sensation\par      Axillary			normal			normal\par      LatAntCubBrach 		normal			normal\par      Median 			normal			normal\par      Ulnar 			normal			normal\par      Radial 			normal			normal\par Motor\par      AIN 				normal			normal\par      Ulnar 			normal			normal\par      Radial 			normal			normal\par      PIN 				normal			normal\par Pulses\par      Radial			2+			2+ [de-identified] : 4 views of the affected right shoulder (AP, Glenoid, Y-View, Axillary)\par demonstrate:\par normal bony calcification without dislocation and no fracture\par 	Arch	2B\par 	AC Joint	moderate Arthrosis with widened joints and small clavicular cysts\par 	GH Joint	trace Arthrosis\par 	Calcifications	none\par \par \par MRI shoulder dated 10-10-18 from [facility]\par My impression of the images:\par Quality of the MRI is ok\par Supraspinatous Tendon full thickness tear retracted to two thirds the distance between the footprint and the glenoid\par Infraspinatous Tendon full-thickness tear retracted to two thirds of the distance from the footprint of the glenoid\par Subscapularis Tendon ok\par Teres Minor Tendon ok\par Muscle Belly Atrophy moderate\par Biceps Tendon is  in the groove surrounded significant fluid and looks intra-articularly but poorly visualized with a stable attachment anchor\par Superior Labrum ok\par Anterior Labrum ok\par Posterior Labrum ok\par AC joint mild signal\par There is no full thickness chondral lesion of the glenoid and humeral head\par \par \par The Final Radiologist Impression:\par Complete full-thickness tear the supraspinatus tendon just proximal to its from for insertion with retraction of the torn free edge the tendon to the 1:00 position along the superior medial humeral head. There is mild associated atrophy of the supraspinatus muscle belly without significant fatty infiltration.\par Moderate infraspinatus and subscapularis tendinopathy.\par Mild subacromial subdeltoid bursitis.\par Mild to moderate long head of biceps tenosynovitis.\par Moderate acromioclavicular joint osteoarthritis\par \par \par 4 views of the affected right shoulder (AP, Glenoid, Y-View, Axillary)\par 3-2019\par demonstrate:\par normal bony calcification without dislocation and no fracture\par 	Arch	2B\par 	AC Joint	moderate Arthrosis\par 	GH Joint	trace Arthrosis\par 	Calcifications	none\par

## 2019-09-23 NOTE — DISCUSSION/SUMMARY
[de-identified] : 5 mo status post Right Shoulder Arthroscopic surgery on 4-11-19\par Two anchor rotator cuff repair with rotation medical patch\par  [ Subacromial decompression ]\par \par \par  Activities as tolerated months 5+\par \par continue physical therapy\par \par motion improving, strength improving\par \par Follow up:\par 12 weeks

## 2019-09-30 LAB
ALBUMIN SERPL ELPH-MCNC: 4.7 G/DL
ALP BLD-CCNC: 131 U/L
ALT SERPL-CCNC: 11 U/L
ANION GAP SERPL CALC-SCNC: 14 MMOL/L
AST SERPL-CCNC: 13 U/L
BILIRUB DIRECT SERPL-MCNC: 0.1 MG/DL
BILIRUB INDIRECT SERPL-MCNC: 0.3 MG/DL
BILIRUB SERPL-MCNC: 0.4 MG/DL
BUN SERPL-MCNC: 18 MG/DL
CALCIUM SERPL-MCNC: 9.9 MG/DL
CHLORIDE SERPL-SCNC: 100 MMOL/L
CHOLEST SERPL-MCNC: 156 MG/DL
CHOLEST/HDLC SERPL: 3.3 RATIO
CK SERPL-CCNC: 53 U/L
CO2 SERPL-SCNC: 27 MMOL/L
CREAT SERPL-MCNC: 0.6 MG/DL
CREAT SPEC-SCNC: 142 MG/DL
ESTIMATED AVERAGE GLUCOSE: 194 MG/DL
FRUCTOSAMINE SERPL-MCNC: 340 UMOL/L
GLUCOSE BS SERPL-MCNC: 156 MG/DL
GLUCOSE SERPL-MCNC: 163 MG/DL
HBA1C MFR BLD HPLC: 8.4 %
HDLC SERPL-MCNC: 47 MG/DL
LDLC SERPL CALC-MCNC: 75 MG/DL
MICROALBUMIN 24H UR DL<=1MG/L-MCNC: <1.2 MG/DL
MICROALBUMIN/CREAT 24H UR-RTO: NORMAL MG/G
POTASSIUM SERPL-SCNC: 4.6 MMOL/L
PROT SERPL-MCNC: 7.1 G/DL
SODIUM SERPL-SCNC: 141 MMOL/L
TRIGL SERPL-MCNC: 171 MG/DL

## 2019-10-03 ENCOUNTER — APPOINTMENT (OUTPATIENT)
Dept: ENDOCRINOLOGY | Facility: CLINIC | Age: 79
End: 2019-10-03
Payer: MEDICAID

## 2019-10-03 VITALS
TEMPERATURE: 98 F | SYSTOLIC BLOOD PRESSURE: 130 MMHG | WEIGHT: 146 LBS | RESPIRATION RATE: 16 BRPM | BODY MASS INDEX: 30.64 KG/M2 | HEART RATE: 72 BPM | HEIGHT: 58 IN | OXYGEN SATURATION: 98 % | DIASTOLIC BLOOD PRESSURE: 73 MMHG

## 2019-10-03 LAB — GLUCOSE BLDC GLUCOMTR-MCNC: 164

## 2019-10-03 PROCEDURE — 82962 GLUCOSE BLOOD TEST: CPT

## 2019-10-03 PROCEDURE — 99214 OFFICE O/P EST MOD 30 MIN: CPT | Mod: 25

## 2019-10-03 NOTE — HISTORY OF PRESENT ILLNESS
[FreeTextEntry1] : Patient feels well, no weight change, at home her FBS are around 130 mg/dl. Apparently she is taking the medication regularly except for the Tradjenta it was not given to her. The FBS and HbA1c remain elevated.

## 2019-10-03 NOTE — ASSESSMENT
[FreeTextEntry1] : Poor diabetic control\par Not taking the medications properly\par Will start Januvia 50 mg po QD, the insurance does not pay for Tradjenta\par Advised the patient to follow the diet and walk

## 2019-10-03 NOTE — PHYSICAL EXAM
[Alert] : alert [Normal Sclera/Conjunctiva] : normal sclera/conjunctiva [PERRL] : pupils equal, round and reactive to light [No Acute Distress] : no acute distress [Normal TMs] : both tympanic membranes were normal [Normal Outer Ear/Nose] : the ears and nose were normal in appearance [Thyroid Not Enlarged] : the thyroid was not enlarged [No Neck Mass] : no neck mass was observed [No Respiratory Distress] : no respiratory distress [Normal Rate and Effort] : normal respiratory rhythm and effort [Clear to Auscultation] : lungs were clear to auscultation bilaterally [Normal PMI] : the apical impulse was normal [No Clubbing, Cyanosis] : no clubbing  or cyanosis of the fingernails [Normal Gait] : normal gait [Normal Rate] : heart rate was normal  [No Rash] : no rash [No Motor Deficits] : the motor exam was normal [No Sensory Deficits] : the sensory exam was normal to light touch and pinprick [Normal Sensation on Monofilament Testing] : normal sensation on monofilament testing of lower extremities

## 2019-10-08 ENCOUNTER — APPOINTMENT (OUTPATIENT)
Dept: RHEUMATOLOGY | Facility: CLINIC | Age: 79
End: 2019-10-08
Payer: MEDICAID

## 2019-10-08 VITALS
RESPIRATION RATE: 16 BRPM | TEMPERATURE: 98.5 F | WEIGHT: 148 LBS | OXYGEN SATURATION: 98 % | HEART RATE: 81 BPM | HEIGHT: 58 IN | BODY MASS INDEX: 31.07 KG/M2 | DIASTOLIC BLOOD PRESSURE: 67 MMHG | SYSTOLIC BLOOD PRESSURE: 124 MMHG

## 2019-10-08 DIAGNOSIS — M75.20 BICIPITAL TENDINITIS, UNSPECIFIED SHOULDER: ICD-10-CM

## 2019-10-08 DIAGNOSIS — M19.041 PRIMARY OSTEOARTHRITIS, RIGHT HAND: ICD-10-CM

## 2019-10-08 PROCEDURE — 99214 OFFICE O/P EST MOD 30 MIN: CPT

## 2019-10-08 NOTE — ASSESSMENT
[FreeTextEntry1] : Patient with cervical DJD  with supraspinatus tear s/p repair, with L SAB, bicipital tendinitis,  +dsDNA Abs :\par \par Explained importance of health screening in the setting of ds DNA Abs in this age group; due to migratory arthralgias, would like to c/w anti-malarial therapy such as Hydroxychloroquine (HCQ) to address migratory arthralgias before considering disease modifying agents such as MTX.   She understands the importance of yearly Opthalmology screens for visual screen testing to assess for retinal toxicity.  She understands the importance of sun avoidance and the application of SPF protection as well as the use of wide brim hats.PT continued for b/l shoulders.  She currently does not have other systemic symptoms.  Topical anti-inflammatory given for pain relief. PT, ROM and stretching exercises for tendinitis demonstrated.  Recommend paraffin wax and glucosamine supplementation for hand OA.\par Patient will continue with lifestyle and exercise modification to address fatty liver and cardiovascular health. \par She will continue on Calcium and VitD at the recommended doses of 1200mg and 800IU daily, respectively.  We reviewed calcium and VitD enriched foods as well.\par \par \par She is in agreement with the above plan and will return in three month' time.\par  \par

## 2019-10-08 NOTE — REASON FOR VISIT
[[employer] ] : provided by [employer]   [Follow-Up: _____] : a [unfilled] follow-up visit [FreeTextEntry1] : [id number] [TWNoteComboBox1] : Micronesian [FreeTextEntry2] : Lucian

## 2019-10-08 NOTE — REVIEW OF SYSTEMS
[Fever] : no fever [Eye Pain] : no eye pain [Chills] : no chills [Sore Throat] : no sore throat [Hoarseness] : no hoarseness [Chest Pain] : no chest pain [SOB on Exertion] : no shortness of breath during exertion [Palpitations] : no palpitations [Dysuria] : no dysuria [Arthralgias] : arthralgias [Joint Swelling] : no joint swelling [Difficulty Walking] : difficulty walking [Joint Stiffness] : joint stiffness [Skin Lesions] : no skin lesions [Depression] : no depression [Muscle Weakness] : no muscle weakness [Feelings Of Weakness] : no feelings of weakness [Easy Bruising] : no tendency for easy bruising [Easy Bleeding] : no tendency for easy bleeding

## 2019-10-08 NOTE — PHYSICAL EXAM
[General Appearance - In No Acute Distress] : in no acute distress [Sclera] : the sclera and conjunctiva were normal [General Appearance - Alert] : alert [Oropharynx] : the oropharynx was normal [Examination Of The Oral Cavity] : the lips and gums were normal [Heart Rate And Rhythm] : heart rate was normal and rhythm regular [Edema] : there was no peripheral edema [Auscultation Breath Sounds / Voice Sounds] : lungs were clear to auscultation bilaterally [No Spinal Tenderness] : no spinal tenderness [Abnormal Walk] : normal gait [Musculoskeletal - Swelling] : no joint swelling seen [Motor Tone] : muscle strength and tone were normal [] : no rash [Motor Exam] : the motor exam was normal [FreeTextEntry1] : Varicose veins over the LE [Impaired Insight] : insight and judgment were intact

## 2019-10-08 NOTE — HISTORY OF PRESENT ILLNESS
[FreeTextEntry1] : Patient on HCQ x 2 weeks without issues.  Patient evaluated by Dr. Gonzalez of Opthalmology, [phone] , receiving clearance to initiate.  Patient reports marked improvement in R shoulder pain secondary to PT, left shoulder continues with stiffness , however with 30% improvement.  She reports use of topical Rx to assist.  She has no new symptoms of joint swelling or fatigue.  \par She currently denies visual disturbances, oral ulcers, shortness of breath, chest pain, motor/sensory disturbances, rash, Raynauds or fever.\par

## 2019-11-04 ENCOUNTER — APPOINTMENT (OUTPATIENT)
Dept: HEPATOLOGY | Facility: CLINIC | Age: 79
End: 2019-11-04
Payer: MEDICAID

## 2019-11-04 VITALS
TEMPERATURE: 98.4 F | RESPIRATION RATE: 16 BRPM | DIASTOLIC BLOOD PRESSURE: 78 MMHG | HEART RATE: 82 BPM | SYSTOLIC BLOOD PRESSURE: 126 MMHG | HEIGHT: 58 IN | OXYGEN SATURATION: 97 %

## 2019-11-04 PROCEDURE — 99213 OFFICE O/P EST LOW 20 MIN: CPT

## 2019-11-11 NOTE — DATA REVIEWED
Thoracic surgeon referral placed per Dr. Presley.   [No studies available for review at this time.] : No studies available for review at this time.

## 2019-11-13 ENCOUNTER — APPOINTMENT (OUTPATIENT)
Dept: INTERNAL MEDICINE | Facility: CLINIC | Age: 79
End: 2019-11-13
Payer: MEDICAID

## 2019-11-13 VITALS
WEIGHT: 146 LBS | OXYGEN SATURATION: 98 % | BODY MASS INDEX: 30.64 KG/M2 | TEMPERATURE: 98.2 F | SYSTOLIC BLOOD PRESSURE: 111 MMHG | HEART RATE: 86 BPM | HEIGHT: 58 IN | DIASTOLIC BLOOD PRESSURE: 73 MMHG

## 2019-11-13 DIAGNOSIS — Z01.818 ENCOUNTER FOR OTHER PREPROCEDURAL EXAMINATION: ICD-10-CM

## 2019-11-13 DIAGNOSIS — Z87.19 PERSONAL HISTORY OF OTHER DISEASES OF THE DIGESTIVE SYSTEM: ICD-10-CM

## 2019-11-13 PROCEDURE — 99204 OFFICE O/P NEW MOD 45 MIN: CPT

## 2019-11-13 RX ORDER — RANITIDINE HYDROCHLORIDE 150 MG/1
150 CAPSULE ORAL
Qty: 30 | Refills: 3 | Status: DISCONTINUED | COMMUNITY
Start: 2019-05-13 | End: 2019-11-13

## 2019-11-13 NOTE — HISTORY OF PRESENT ILLNESS
[Nausea] : denies nausea [Heartburn] : denies heartburn [Vomiting] : denies vomiting [Constipation] : denies constipation [Diarrhea] : denies diarrhea [Yellow Skin Or Eyes (Jaundice)] : denies jaundice [Abdominal Pain] : denies abdominal pain [Rectal Pain] : denies rectal pain [Abdominal Swelling] : denies abdominal swelling [Wt Gain ___ Lbs] : no recent weight gain [Wt Loss ___ Lbs] : recent [unfilled] ~Upound(s) weight loss [GERD] : gastroesophageal reflux disease [Good Compliance] : good compliance with treatment [de-identified] : pt has been seen by hepatologist and fibroscan is order in fu.  She has been seen by rheumatologist and has been diagnosed with lupus.   She was started on Plaquenil.  pt is feeling well.  She sometimes has gas and has increased belching.  it occurs in afternoon and night time.  She has lactose intolerance and is to take lactaid but doesn’t take it regularly.  No diarrhea or nausea or vomiting . She has a bm daily

## 2019-11-13 NOTE — PHYSICAL EXAM
[General Appearance - Alert] : alert [General Appearance - In No Acute Distress] : in no acute distress [PERRL With Normal Accommodation] : pupils were equal in size, round, and reactive to light [Oropharynx] : the oropharynx was normal [Neck Appearance] : the appearance of the neck was normal [Heart Rate And Rhythm] : heart rate was normal and rhythm regular [Auscultation Breath Sounds / Voice Sounds] : lungs were clear to auscultation bilaterally [Murmurs] : no murmurs [Heart Sounds Gallop] : no gallops [Heart Sounds] : normal S1 and S2 [Heart Sounds Pericardial Friction Rub] : no pericardial rub [Edema] : there was no peripheral edema [Abdomen Soft] : soft [Bowel Sounds] : normal bowel sounds [] : no hepato-splenomegaly [Abdomen Tenderness] : non-tender [Abdomen Mass (___ Cm)] : no abdominal mass palpated [Cervical Lymph Nodes Enlarged Posterior Bilaterally] : posterior cervical [Cervical Lymph Nodes Enlarged Anterior Bilaterally] : anterior cervical [No CVA Tenderness] : no ~M costovertebral angle tenderness [Abnormal Walk] : normal gait [Musculoskeletal - Swelling] : no joint swelling seen [Nail Clubbing] : no clubbing  or cyanosis of the fingernails [Motor Tone] : muscle strength and tone were normal [Skin Color & Pigmentation] : normal skin color and pigmentation [Deep Tendon Reflexes (DTR)] : deep tendon reflexes were 2+ and symmetric [Sensation] : the sensory exam was normal to light touch and pinprick [Skin Turgor] : normal skin turgor [No Focal Deficits] : no focal deficits [Oriented To Time, Place, And Person] : oriented to person, place, and time [Impaired Insight] : insight and judgment were intact [Affect] : the affect was normal

## 2019-11-13 NOTE — REASON FOR VISIT
[Follow-Up: _____] : a [unfilled] follow-up visit [FreeTextEntry1] : [id number] [[employer] ] : provided by [employer]   [TWNoteComboBox1] : Kyrgyz [FreeTextEntry2] : Esme

## 2019-11-13 NOTE — CONSULT LETTER
[Courtesy Letter:] : I had the pleasure of seeing your patient, [unfilled], in my office today. [Dear  ___] : Dear  [unfilled], [Please see my note below.] : Please see my note below. [FreeTextEntry3] : Miguel Sahni MDFACP  [Sincerely,] : Sincerely,

## 2019-11-13 NOTE — ASSESSMENT
[FreeTextEntry1] : abd bloating -  continue lactaid avoid foods with lactose and chew foods well and avoid gas forming foods high fiber.   fatty nathalie weight loss exercise   fu fibroscan with hepatologist.   pancreatic duct dilated and atrophy and will do mrcp and mri.  She is leaving for her country and will be away for a few months and wants to wait till she returns from vacation for the test.   presently she doesn’t have any abd pain,or  diarrhea .   She should have vaccination against hep b .

## 2019-11-25 ENCOUNTER — APPOINTMENT (OUTPATIENT)
Dept: ORTHOPEDIC SURGERY | Facility: CLINIC | Age: 79
End: 2019-11-25
Payer: MEDICAID

## 2019-11-25 DIAGNOSIS — M75.100 UNSPECIFIED ROTATOR CUFF TEAR OR RUPTURE OF UNSPECIFIED SHOULDER, NOT SPECIFIED AS TRAUMATIC: ICD-10-CM

## 2019-11-25 PROCEDURE — 99212 OFFICE O/P EST SF 10 MIN: CPT

## 2019-11-25 NOTE — DISCUSSION/SUMMARY
[de-identified] : 7 mo status post Right Shoulder Arthroscopic surgery on 4-11-19\par Two anchor rotator cuff repair with rotation medical patch\par  [ Subacromial decompression ]\par \par \par  Activities as tolerated months 5+\par \par continue physical therapy and home exercises\par \par motion improving, strength improving\par \par going to [facility] for remaining year\par \par Follow up:\par 1 year postop

## 2019-11-25 NOTE — HISTORY OF PRESENT ILLNESS
[de-identified] : Patient is status post Right Shoulder Arthroscopic surgery on 4-11-19\par Two anchor rotator cuff repair with rotation medical patch\par  [ Subacromial decompression ]\par  \par The patient is doing well with noat rest. has mild pinching pain w FF and abd at maximim motion. \par \par Review of Systems is positive for the above musculoskeletal complaints as noted in the HPI above and is otherwise non-contributory for general, constitutional, psychiatric, neurologic, lymph/skin, HEENT, cardiac, respiratory, gastrointestinal, and reproductive systems. Please see intake form.\par

## 2019-11-25 NOTE — PHYSICAL EXAM
[de-identified] : Physical Examination\par General: well nourished, in no acute distress, alert and oriented to person, place and time\par Psychiatric: normal mood and affect, no abnormal movements or speech patterns\par Eyes: vision intact without glasses\par Throat: no thyromegaly\par Lymph: no enlarged nodes, no lymphedema in extremity\par Respiratory: no wheezing, no shortness of breath with ambulation\par Cardiac: no cardiac leg swelling, 2+ peripheral pulses\par Neurology: normal gross sensation in extremities to light touch\par Abdomen: soft, non-tender, tympanic, no masses\par \par Musculoskeletal Examination\par Cervical spine	Full painless range of motion and negative Spurling's test\par \par Shoulder			Right			Left\par Appearance\par      Skin/Swelling/Deformity	healed incisions			normal\par      Scapular Winging		-			-\par Range of Motion\par      Forward Flexion		150/150		170 / 170\par      Abduction			120 / 120		170 / 170\par      External Rotation		45			45\par      Internal Rotation		L1			T10\par      SAbd Ext Rotation		60			90\par      SAbd Int Rotation		50			80\par      Painful Arc			+			-\par      Crepitus			-			-\par Palpation\par      Clavicle			-			-\par      AC Joint			-			-\par      Posterior Acromion		-			-\par      Levator Scapula		-			-\par      Lateral Bursa			-			-\par      Impingement Area		-			-\par      Biceps Tendon		-			-\par      Anterior Capsule		-			-\par Strength Examination\par      Supraspinatous 		4+ / 0			5+ / 0\par      Infraspinatous			5- / 0			5+ / 0\par      Subscapularis			5+ / 0			5+ / 0\par      Belly Press			5+ / 0			5+ / 0\par      Lift Off			-			-\par      Drop-Arm			-			-\par Special Examination\par      Biceps Russell's		-			-\par      Impingement Neer		-			-\par      Impingement Hawking		-			-\par \par Sensation\par      Axillary			normal			normal\par      LatAntCubBrach 		normal			normal\par      Median 			normal			normal\par      Ulnar 			normal			normal\par      Radial 			normal			normal\par Motor\par      AIN 				normal			normal\par      Ulnar 			normal			normal\par      Radial 			normal			normal\par      PIN 				normal			normal\par Pulses\par      Radial			2+			2+ [de-identified] : 4 views of the affected right shoulder (AP, Glenoid, Y-View, Axillary)\par demonstrate:\par normal bony calcification without dislocation and no fracture\par 	Arch	2B\par 	AC Joint	moderate Arthrosis with widened joints and small clavicular cysts\par 	GH Joint	trace Arthrosis\par 	Calcifications	none\par \par \par MRI shoulder dated 10-10-18 from [facility]\par My impression of the images:\par Quality of the MRI is ok\par Supraspinatous Tendon full thickness tear retracted to two thirds the distance between the footprint and the glenoid\par Infraspinatous Tendon full-thickness tear retracted to two thirds of the distance from the footprint of the glenoid\par Subscapularis Tendon ok\par Teres Minor Tendon ok\par Muscle Belly Atrophy moderate\par Biceps Tendon is  in the groove surrounded significant fluid and looks intra-articularly but poorly visualized with a stable attachment anchor\par Superior Labrum ok\par Anterior Labrum ok\par Posterior Labrum ok\par AC joint mild signal\par There is no full thickness chondral lesion of the glenoid and humeral head\par \par \par The Final Radiologist Impression:\par Complete full-thickness tear the supraspinatus tendon just proximal to its from for insertion with retraction of the torn free edge the tendon to the 1:00 position along the superior medial humeral head. There is mild associated atrophy of the supraspinatus muscle belly without significant fatty infiltration.\par Moderate infraspinatus and subscapularis tendinopathy.\par Mild subacromial subdeltoid bursitis.\par Mild to moderate long head of biceps tenosynovitis.\par Moderate acromioclavicular joint osteoarthritis\par \par \par 4 views of the affected right shoulder (AP, Glenoid, Y-View, Axillary)\par 3-2019\par demonstrate:\par normal bony calcification without dislocation and no fracture\par 	Arch	2B\par 	AC Joint	moderate Arthrosis\par 	GH Joint	trace Arthrosis\par 	Calcifications	none\par

## 2019-12-03 NOTE — ASU PATIENT PROFILE, ADULT - IS PATIENT PREGNANT?
Patient had positive home pregnancy test.  LMP 10/31/19 EDC 20, making patient 4w5d.  ,history of one miscarriage. Patient is taking PNV.  She is not a smoker and will limit caffeine to less than 300mg a day.  She does not have a cat.   She is to call with any of these symptoms.  Patient is to cook red meats to 160 degrees and no raw eggs, fresh lunch meat is ok but bologna and hotdogs need to be cooked.  Tylenol can be taken for aches and pains, no Ibuprofen.  Patient will be called tomorrow to schedule ultrasound and new ob nurse visit.   no

## 2020-01-28 LAB
ALBUMIN SERPL ELPH-MCNC: 4.3 G/DL
ALP BLD-CCNC: 87 U/L
ALT SERPL-CCNC: 13 U/L
ANION GAP SERPL CALC-SCNC: 14 MMOL/L
AST SERPL-CCNC: 13 U/L
BILIRUB DIRECT SERPL-MCNC: 0.1 MG/DL
BILIRUB INDIRECT SERPL-MCNC: 0.2 MG/DL
BILIRUB SERPL-MCNC: 0.4 MG/DL
BUN SERPL-MCNC: 22 MG/DL
CALCIUM SERPL-MCNC: 9.7 MG/DL
CHLORIDE SERPL-SCNC: 102 MMOL/L
CHOLEST SERPL-MCNC: 155 MG/DL
CHOLEST/HDLC SERPL: 3.2 RATIO
CK SERPL-CCNC: 66 U/L
CO2 SERPL-SCNC: 25 MMOL/L
CREAT SERPL-MCNC: 0.66 MG/DL
FRUCTOSAMINE SERPL-MCNC: 256 UMOL/L
GLUCOSE SERPL-MCNC: 113 MG/DL
HDLC SERPL-MCNC: 48 MG/DL
LDLC SERPL CALC-MCNC: 88 MG/DL
POTASSIUM SERPL-SCNC: 4.7 MMOL/L
PROT SERPL-MCNC: 6.8 G/DL
SODIUM SERPL-SCNC: 141 MMOL/L
T4 FREE SERPL-MCNC: 1.4 NG/DL
TRIGL SERPL-MCNC: 90 MG/DL

## 2020-01-29 LAB
CREAT SPEC-SCNC: 108 MG/DL
ESTIMATED AVERAGE GLUCOSE: 151 MG/DL
GLUCOSE BS SERPL-MCNC: 110 MG/DL
HBA1C MFR BLD HPLC: 6.9 %
MICROALBUMIN 24H UR DL<=1MG/L-MCNC: <1.2 MG/DL
MICROALBUMIN/CREAT 24H UR-RTO: NORMAL MG/G

## 2020-02-03 ENCOUNTER — APPOINTMENT (OUTPATIENT)
Dept: ORTHOPEDIC SURGERY | Facility: CLINIC | Age: 80
End: 2020-02-03
Payer: MEDICAID

## 2020-02-03 VITALS
DIASTOLIC BLOOD PRESSURE: 82 MMHG | BODY MASS INDEX: 30.64 KG/M2 | HEIGHT: 58 IN | WEIGHT: 146 LBS | SYSTOLIC BLOOD PRESSURE: 127 MMHG | HEART RATE: 80 BPM

## 2020-02-03 PROCEDURE — 99212 OFFICE O/P EST SF 10 MIN: CPT

## 2020-02-03 NOTE — DISCUSSION/SUMMARY
[de-identified] : 10 mo status post Right Shoulder Arthroscopic surgery on 4-11-19\par Two anchor rotator cuff repair with rotation medical patch\par  [ Subacromial decompression ]\par \par \par  Activities as tolerated months 5+\par \par continue physical therapy and home exercises\par \par motion improving, strength improving\par \par going to [facility] for remaining year\par \par Follow up:\par 6 mo

## 2020-02-03 NOTE — HISTORY OF PRESENT ILLNESS
[de-identified] : Patient is status post Right Shoulder Arthroscopic surgery on 4-11-19\par Two anchor rotator cuff repair with rotation medical patch\par  [ Subacromial decompression ]\par  \par The patient is doing well with none at rest. has mild pinching pain anterolateral. improving rom\par \par Review of Systems is positive for the above musculoskeletal complaints as noted in the HPI above and is otherwise non-contributory for general, constitutional, psychiatric, neurologic, lymph/skin, HEENT, cardiac, respiratory, gastrointestinal, and reproductive systems. Please see intake form.

## 2020-02-03 NOTE — PHYSICAL EXAM
[de-identified] : Physical Examination\par General: well nourished, in no acute distress, alert and oriented to person, place and time\par Psychiatric: normal mood and affect, no abnormal movements or speech patterns\par Eyes: vision intact without glasses\par Throat: no thyromegaly\par Lymph: no enlarged nodes, no lymphedema in extremity\par Respiratory: no wheezing, no shortness of breath with ambulation\par Cardiac: no cardiac leg swelling, 2+ peripheral pulses\par Neurology: normal gross sensation in extremities to light touch\par Abdomen: soft, non-tender, tympanic, no masses\par \par Musculoskeletal Examination\par Cervical spine	Full painless range of motion and negative Spurling's test\par \par Shoulder			Right			Left\par Appearance\par      Skin/Swelling/Deformity	healed incisions			normal\par      Scapular Winging		-			-\par Range of Motion\par      Forward Flexion		160/160		170 / 170\par      Abduction			140 / 140		170 / 170\par      External Rotation		45			45\par      Internal Rotation		L1			T10\par      SAbd Ext Rotation		70			90\par      SAbd Int Rotation		60			80\par      Painful Arc			+			-\par      Crepitus			-			-\par Palpation\par      Clavicle			-			-\par      AC Joint			-			-\par      Posterior Acromion		-			-\par      Levator Scapula		-			-\par      Lateral Bursa			-			-\par      Impingement Area		-			-\par      Biceps Tendon		-			-\par      Anterior Capsule		-			-\par Strength Examination\par      Supraspinatous 		4+ / 0			5+ / 0\par      Infraspinatous			5+ / 0			5+ / 0\par      Subscapularis			5+ / 0			5+ / 0\par      Belly Press			5+ / 0			5+ / 0\par      Lift Off			-			-\par      Drop-Arm			-			-\par Special Examination\par      Biceps [place]'s		-			-\par      Impingement Neer		-			-\par      Impingement Hawking		-			-\par \par Sensation\par      Axillary			normal			normal\par      LatAntCubBrach 		normal			normal\par      Median 			normal			normal\par      Ulnar 			normal			normal\par      Radial 			normal			normal\par Motor\par      AIN 				normal			normal\par      Ulnar 			normal			normal\par      Radial 			normal			normal\par      PIN 				normal			normal\par Pulses\par      Radial			2+			2+ [de-identified] : 4 views of the affected right shoulder (AP, Glenoid, Y-View, Axillary)\par demonstrate:\par normal bony calcification without dislocation and no fracture\par 	Arch	2B\par 	AC Joint	moderate Arthrosis with widened joints and small clavicular cysts\par 	GH Joint	trace Arthrosis\par 	Calcifications	none\par \par \par MRI shoulder dated 10-10-18 from [facility]\par My impression of the images:\par Quality of the MRI is ok\par Supraspinatous Tendon full thickness tear retracted to two thirds the distance between the footprint and the glenoid\par Infraspinatous Tendon full-thickness tear retracted to two thirds of the distance from the footprint of the glenoid\par Subscapularis Tendon ok\par Teres Minor Tendon ok\par Muscle Belly Atrophy moderate\par Biceps Tendon is  in the groove surrounded significant fluid and looks intra-articularly but poorly visualized with a stable attachment anchor\par Superior Labrum ok\par Anterior Labrum ok\par Posterior Labrum ok\par AC joint mild signal\par There is no full thickness chondral lesion of the glenoid and humeral head\par \par \par The Final Radiologist Impression:\par Complete full-thickness tear the supraspinatus tendon just proximal to its from for insertion with retraction of the torn free edge the tendon to the 1:00 position along the superior medial humeral head. There is mild associated atrophy of the supraspinatus muscle belly without significant fatty infiltration.\par Moderate infraspinatus and subscapularis tendinopathy.\par Mild subacromial subdeltoid bursitis.\par Mild to moderate long head of biceps tenosynovitis.\par Moderate acromioclavicular joint osteoarthritis\par \par \par 4 views of the affected right shoulder (AP, Glenoid, Y-View, Axillary)\par 3-2019\par demonstrate:\par normal bony calcification without dislocation and no fracture\par 	Arch	2B\par 	AC Joint	moderate Arthrosis\par 	GH Joint	trace Arthrosis\par 	Calcifications	none\par

## 2020-02-04 ENCOUNTER — APPOINTMENT (OUTPATIENT)
Dept: RHEUMATOLOGY | Facility: CLINIC | Age: 80
End: 2020-02-04
Payer: MEDICAID

## 2020-02-04 VITALS
RESPIRATION RATE: 16 BRPM | DIASTOLIC BLOOD PRESSURE: 84 MMHG | WEIGHT: 145 LBS | SYSTOLIC BLOOD PRESSURE: 138 MMHG | HEIGHT: 58 IN | BODY MASS INDEX: 30.44 KG/M2 | TEMPERATURE: 98.3 F | OXYGEN SATURATION: 97 % | HEART RATE: 101 BPM

## 2020-02-04 PROCEDURE — 99214 OFFICE O/P EST MOD 30 MIN: CPT

## 2020-02-04 NOTE — ASSESSMENT
[FreeTextEntry1] : Patient with cervical DJD  with supraspinatus tear s/p repair, with L SAB, bicipital tendinitis,  +dsDNA Abs :\par \par Patient to c/w anti-malarial therapy such as Hydroxychloroquine (HCQ) to address migratory arthralgias before considering disease modifying agents such as MTX.   Explained importance of health screening in the setting of ds DNA Abs in this age group. She understands the importance of yearly Opthalmology screens for visual screen testing to assess for retinal toxicity.  She understands the importance of sun avoidance and the application of SPF protection as well as the use of wide brim hats.PT continued for b/l shoulders.  She currently does not have other systemic symptoms.  Topical anti-inflammatory given for pain relief. PT, ROM and stretching exercises for tendinitis demonstrated.  \par Recommend paraffin wax and glucosamine supplementation for hand OA.\par Patient will continue with lifestyle and exercise modification to address fatty liver and cardiovascular health. \par She will continue on Calcium and VitD at the recommended doses of 1200mg and 800IU daily, respectively.  We reviewed calcium and VitD enriched foods as well.\par \par \par She is in agreement with the above plan and will return in three month' time.\par  \par

## 2020-02-04 NOTE — REASON FOR VISIT
[Follow-Up: _____] : a [unfilled] follow-up visit [[employer] ] : provided by [employer]   [FreeTextEntry1] : [id number] [FreeTextEntry2] : Viry [TWNoteComboBox1] : Mongolian

## 2020-02-04 NOTE — CONSULT LETTER
[Dear  ___] : Dear  [unfilled], [Courtesy Letter:] : I had the pleasure of seeing your patient, [unfilled], in my office today. [Please see my note below.] : Please see my note below. [Consult Closing:] : Thank you very much for allowing me to participate in the care of this patient.  If you have any questions, please do not hesitate to contact me. [Sincerely,] : Sincerely, [FreeTextEntry3] : Susanna Bui M.D.\par  of Medicine \par [facility] [employer] [facility]/Shandra\par \par  [FreeTextEntry2] : Litzy Martino MD

## 2020-02-04 NOTE — PHYSICAL EXAM
[General Appearance - In No Acute Distress] : in no acute distress [General Appearance - Alert] : alert [Sclera] : the sclera and conjunctiva were normal [Oropharynx] : the oropharynx was normal [Examination Of The Oral Cavity] : the lips and gums were normal [Heart Rate And Rhythm] : heart rate was normal and rhythm regular [Auscultation Breath Sounds / Voice Sounds] : lungs were clear to auscultation bilaterally [Edema] : there was no peripheral edema [No Spinal Tenderness] : no spinal tenderness [Musculoskeletal - Swelling] : no joint swelling seen [Motor Tone] : muscle strength and tone were normal [Abnormal Walk] : normal gait [] : no rash [Motor Exam] : the motor exam was normal [Impaired Insight] : insight and judgment were intact [FreeTextEntry1] : R shoulder with 140' abduction ,left shoulder with shoulder abduction to 180' ; tender over L bicipital groove upon resisted supination; ;Appropriate external rotation of the hips bilaterally with minimal tenderness along the joint lines.

## 2020-02-04 NOTE — HISTORY OF PRESENT ILLNESS
[FreeTextEntry1] : Patient returns for f/u and resumes PT for R shoulder s/p rotator cuff tear 4/2019.  Patient explains mild tenderness over the left shoulder bursa however c/w ADL's without difficulty.    Patient tolerating HCQ , applying sunscreen and sun protection on recent trip to [facility].  Patient otherwise denies photosensitivity, sicca symptoms or rash.  She otherwise denies visual disturbances, oral ulcers, shortness of breath, chest pain, motor/sensory disturbances, Raynauds or fever.\par

## 2020-02-04 NOTE — REVIEW OF SYSTEMS
[Arthralgias] : arthralgias [Joint Stiffness] : joint stiffness [Difficulty Walking] : difficulty walking [Fever] : no fever [Chills] : no chills [Eye Pain] : no eye pain [Sore Throat] : no sore throat [Hoarseness] : no hoarseness [Palpitations] : no palpitations [Chest Pain] : no chest pain [SOB on Exertion] : no shortness of breath during exertion [Dysuria] : no dysuria [Joint Swelling] : no joint swelling [Skin Lesions] : no skin lesions [Depression] : no depression [Muscle Weakness] : no muscle weakness [Feelings Of Weakness] : no feelings of weakness [Easy Bleeding] : no tendency for easy bleeding [Easy Bruising] : no tendency for easy bruising

## 2020-02-11 ENCOUNTER — APPOINTMENT (OUTPATIENT)
Dept: ENDOCRINOLOGY | Facility: CLINIC | Age: 80
End: 2020-02-11
Payer: MEDICAID

## 2020-02-11 VITALS
SYSTOLIC BLOOD PRESSURE: 120 MMHG | WEIGHT: 143 LBS | RESPIRATION RATE: 16 BRPM | HEART RATE: 83 BPM | TEMPERATURE: 98.6 F | BODY MASS INDEX: 30.02 KG/M2 | DIASTOLIC BLOOD PRESSURE: 76 MMHG | OXYGEN SATURATION: 98 % | HEIGHT: 58 IN

## 2020-02-11 LAB — GLUCOSE BLDC GLUCOMTR-MCNC: 154

## 2020-02-11 PROCEDURE — 82962 GLUCOSE BLOOD TEST: CPT

## 2020-02-11 PROCEDURE — 99214 OFFICE O/P EST MOD 30 MIN: CPT | Mod: 25

## 2020-02-11 RX ORDER — LINAGLIPTIN 5 MG/1
5 TABLET, FILM COATED ORAL
Qty: 90 | Refills: 3 | Status: COMPLETED | COMMUNITY
Start: 2019-06-19 | End: 2020-02-11

## 2020-02-11 RX ORDER — SITAGLIPTIN 50 MG/1
50 TABLET, FILM COATED ORAL DAILY
Qty: 90 | Refills: 3 | Status: COMPLETED | COMMUNITY
Start: 2019-10-03 | End: 2020-02-11

## 2020-02-11 NOTE — PHYSICAL EXAM
[Alert] : alert [No Acute Distress] : no acute distress [Normal Sclera/Conjunctiva] : normal sclera/conjunctiva [PERRL] : pupils equal, round and reactive to light [Normal Outer Ear/Nose] : the ears and nose were normal in appearance [Normal TMs] : both tympanic membranes were normal [Thyroid Not Enlarged] : the thyroid was not enlarged [No Neck Mass] : no neck mass was observed [No Respiratory Distress] : no respiratory distress [Normal Rate and Effort] : normal respiratory rhythm and effort [Clear to Auscultation] : lungs were clear to auscultation bilaterally [Normal Gait] : normal gait [Normal PMI] : the apical impulse was normal [Normal Rate] : heart rate was normal  [No Clubbing, Cyanosis] : no clubbing  or cyanosis of the fingernails [No Rash] : no rash [No Motor Deficits] : the motor exam was normal [No Sensory Deficits] : the sensory exam was normal to light touch and pinprick [Normal Sensation on Monofilament Testing] : normal sensation on monofilament testing of lower extremities

## 2020-02-11 NOTE — HISTORY OF PRESENT ILLNESS
[FreeTextEntry1] : Patient feels well , she is asymptomatic. Her weight has not changed. She is exercising regularly and following the diet. Her blood glucose at home are better controlled, they are usually around 110 mg/dl. The FBS in the laboratory was 113 mg/dl and the HbA1c was 6.9 %, improved/unchanged/increased since last visit. The renal function is within normal limits, the microalbumin in the urine was normal. The lipid panel was within normal limits. She denies low blood glucose during the night. She denies chest pain, or SOB. Denies numbness, tingling or burning sensation on her extremities. Taking her medications regularly. She has not seen the Ophthalmologist recently. She has seen Podiatrist recently. She has not seen the Cardiologist recently.\par

## 2020-02-11 NOTE — ASSESSMENT
[FreeTextEntry1] : The diabetic control has improved\par She has not been taking Januvia for the past month\par Advised to continue the diet and exercise\par Will discontinue Januvia\par Will continue the rest of the treatment

## 2020-03-16 ENCOUNTER — APPOINTMENT (OUTPATIENT)
Dept: INTERNAL MEDICINE | Facility: CLINIC | Age: 80
End: 2020-03-16
Payer: MEDICAID

## 2020-03-16 VITALS
WEIGHT: 146 LBS | BODY MASS INDEX: 30.64 KG/M2 | SYSTOLIC BLOOD PRESSURE: 141 MMHG | DIASTOLIC BLOOD PRESSURE: 86 MMHG | HEIGHT: 58 IN | HEART RATE: 83 BPM | TEMPERATURE: 98.2 F | OXYGEN SATURATION: 98 %

## 2020-03-16 DIAGNOSIS — R14.0 ABDOMINAL DISTENSION (GASEOUS): ICD-10-CM

## 2020-03-16 PROCEDURE — 99214 OFFICE O/P EST MOD 30 MIN: CPT

## 2020-03-16 NOTE — HISTORY OF PRESENT ILLNESS
[Heartburn] : denies heartburn [Nausea] : denies nausea [Vomiting] : denies vomiting [Diarrhea] : denies diarrhea [Constipation] : denies constipation [Yellow Skin Or Eyes (Jaundice)] : denies jaundice [Abdominal Pain] : denies abdominal pain [Abdominal Swelling] : denies abdominal swelling [Rectal Pain] : denies rectal pain [Wt Gain ___ Lbs] : recent [unfilled] ~Upound(s) weight gain [Wt Loss ___ Lbs] : no recent weight loss [GERD] : gastroesophageal reflux disease [de-identified] : Pt is is feeling well and doesn’t have nausea or vomiting, black stools, weight loss or dysphagia.  She is eating well and has gained weight. She has a bm daily and no abd pain.

## 2020-03-16 NOTE — REASON FOR VISIT
[Follow-Up: _____] : a [unfilled] follow-up visit [[employer] ] : provided by [employer]   [FreeTextEntry1] : [id number] [FreeTextEntry2] : Clotilde [TWNoteComboBox1] : Nigerian

## 2020-03-16 NOTE — ASSESSMENT
[FreeTextEntry1] : GERD david and will continue discussed Rule of 2's; pt should avoid eating too much; too fast; too spicy; too lousy; less than two hours before bed \par -Things to avoid including overeating, spicy foods, tight clothing, eating within three hours of bed, this list is not all inclusive. \par -For treatment of reflux, possible options discussed including diet control, H2 blockers, PPIs, as well as coating motility agents discussed as treatment options. Timing of meals and proximity of last meal to sleep were discussed. If symptoms persist, a formal gastrointestinal evaluation is needed. \par \par  Pancreatic duct dilated and stable will fu us of abd in May  presently all elective radiological evaluations are canceled.   \par fatty liver-  Fatty Liver: The patient denies any jaundice or pruritus. The patient denies any alcohol use. The patient denies taking large doses of nonsteroidal anti-inflammatory drugs or acetaminophen. The findings are suggestive of fatty liver. The patient and I had a long discussion regarding the risks of fatty liver progressing to cirrhosis. The patient was told of the possible increased risk of developing liver failure, cirrhosis, ascites, GI bleeding secondary to varices, hepatic encephalopathy, bleeding tendencies and liver cancer. The patient was told of the importance of follow-up. The patient was advised to follow up every 6 months for blood work and imaging studies. The patient agreed and will follow up. The patient was advised to lose weight. I recommend a trial of vitamin E supplementation for the fatty liver. If the liver enzymes remain elevated, the patient may require a trial of Pioglitazone for the fatty liver. I recommend avoid alcohol and hepato-toxic agents. The patient was also advised to avoid NSAIDs, Acetaminophen and any other hepatotoxic drugs. The patient was also advised not to share needles, razors, scissors, nail clippers, etc.. The patient is to continue close follow-up in our office for blood work and exams. If the liver enzymes remain elevated, the patient may require a CT guided liver biopsy to assess the liver parenchyma and for possible treatment. We had a long discussion regarding the risks and benefits of the procedure. The patient was told of the risks of bleeding, perforation, infections, emergency surgery and missing lesions. The patient agreed and will follow-up to reassess the symptoms.\par \par DM controlled and hgbA1c is 6.9 % and will continue her fu with endocrinologist.  \par bmi 30  Weight loss, exercise, and diet control were discussed and are highly encouraged. Treatment options were given such as, aqua therapy, and contacting a nutritionist. Recommended to use the elliptical, stationary bike, less use of treadmill. Mindful eating was explained to the patient Obesity is associated with worsening asthma, shortness of breath, and potential for cardiac disease, diabetes, and other underlying medical conditions.\par \par \par

## 2020-03-16 NOTE — PHYSICAL EXAM
[General Appearance - Alert] : alert [General Appearance - In No Acute Distress] : in no acute distress [PERRL With Normal Accommodation] : pupils were equal in size, round, and reactive to light [Oropharynx] : the oropharynx was normal [Auscultation Breath Sounds / Voice Sounds] : lungs were clear to auscultation bilaterally [Heart Rate And Rhythm] : heart rate was normal and rhythm regular [Heart Sounds] : normal S1 and S2 [Heart Sounds Gallop] : no gallops [Heart Sounds Pericardial Friction Rub] : no pericardial rub [Edema] : there was no peripheral edema [Bowel Sounds] : normal bowel sounds [Abdomen Soft] : soft [Abdomen Tenderness] : non-tender [Abdomen Mass (___ Cm)] : no abdominal mass palpated [Cervical Lymph Nodes Enlarged Posterior Bilaterally] : posterior cervical [Cervical Lymph Nodes Enlarged Anterior Bilaterally] : anterior cervical [Supraclavicular Lymph Nodes Enlarged Bilaterally] : supraclavicular [Axillary Lymph Nodes Enlarged Bilaterally] : axillary [Femoral Lymph Nodes Enlarged Bilaterally] : femoral [Inguinal Lymph Nodes Enlarged Bilaterally] : inguinal [No CVA Tenderness] : no ~M costovertebral angle tenderness [No Spinal Tenderness] : no spinal tenderness [Abnormal Walk] : normal gait [Nail Clubbing] : no clubbing  or cyanosis of the fingernails [Musculoskeletal - Swelling] : no joint swelling seen [Motor Tone] : muscle strength and tone were normal [FreeTextEntry1] : arthritic changes of her hands   [Skin Color & Pigmentation] : normal skin color and pigmentation [Skin Turgor] : normal skin turgor [] : no rash [Motor Exam] : the motor exam was normal [Oriented To Time, Place, And Person] : oriented to person, place, and time [Impaired Insight] : insight and judgment were intact [Affect] : the affect was normal

## 2020-04-07 ENCOUNTER — APPOINTMENT (OUTPATIENT)
Dept: HEPATOLOGY | Facility: CLINIC | Age: 80
End: 2020-04-07

## 2020-05-05 ENCOUNTER — APPOINTMENT (OUTPATIENT)
Dept: RHEUMATOLOGY | Facility: CLINIC | Age: 80
End: 2020-05-05

## 2020-05-27 LAB
CREAT SPEC-SCNC: 40 MG/DL
FRUCTOSAMINE SERPL-MCNC: 270 UMOL/L
MICROALBUMIN 24H UR DL<=1MG/L-MCNC: <1.2 MG/DL
MICROALBUMIN/CREAT 24H UR-RTO: NORMAL MG/G

## 2020-05-28 LAB
ALBUMIN SERPL ELPH-MCNC: 4.5 G/DL
ALP BLD-CCNC: 76 U/L
ALT SERPL-CCNC: 11 U/L
ANION GAP SERPL CALC-SCNC: 19 MMOL/L
AST SERPL-CCNC: 13 U/L
BILIRUB DIRECT SERPL-MCNC: 0.1 MG/DL
BILIRUB INDIRECT SERPL-MCNC: 0.3 MG/DL
BILIRUB SERPL-MCNC: 0.4 MG/DL
BUN SERPL-MCNC: 17 MG/DL
CALCIUM SERPL-MCNC: 9.9 MG/DL
CHLORIDE SERPL-SCNC: 102 MMOL/L
CHOLEST SERPL-MCNC: 121 MG/DL
CHOLEST/HDLC SERPL: 2.6 RATIO
CO2 SERPL-SCNC: 24 MMOL/L
CREAT SERPL-MCNC: 0.63 MG/DL
ESTIMATED AVERAGE GLUCOSE: 146 MG/DL
GLUCOSE BS SERPL-MCNC: 125 MG/DL
GLUCOSE SERPL-MCNC: 127 MG/DL
HBA1C MFR BLD HPLC: 6.7 %
HDLC SERPL-MCNC: 47 MG/DL
LDLC SERPL CALC-MCNC: 56 MG/DL
POTASSIUM SERPL-SCNC: 5.1 MMOL/L
PROT SERPL-MCNC: 7.1 G/DL
SODIUM SERPL-SCNC: 145 MMOL/L
TRIGL SERPL-MCNC: 91 MG/DL

## 2020-06-03 ENCOUNTER — APPOINTMENT (OUTPATIENT)
Dept: ENDOCRINOLOGY | Facility: CLINIC | Age: 80
End: 2020-06-03
Payer: MEDICAID

## 2020-06-03 ENCOUNTER — APPOINTMENT (OUTPATIENT)
Dept: ORTHOPEDIC SURGERY | Facility: CLINIC | Age: 80
End: 2020-06-03

## 2020-06-03 PROCEDURE — 99443: CPT

## 2020-06-03 NOTE — ASSESSMENT
[FreeTextEntry1] : The diabetes is well controlled\par She is keeping her weight down\par Advised to continue following the diet and lose weight\par Will renew the medications\par Will repeat the blood tests in 4 months

## 2020-06-03 NOTE — HISTORY OF PRESENT ILLNESS
[FreeTextEntry1] : Patient feels well , she is asymptomatic. Her weight has decreased few lbs. She is exercising regularly and following the diet. Her blood glucose at home are still not well controlled, they are usually around 150 mg/dl. The FBS in the laboratory was 127 mg/dl and the HbA1c was 6.7 %, improved since last visit. The renal function is within normal limits, the microalbumin in the urine was normal. She denies low blood glucose during the night. She denies chest pain, or SOB. Denies numbness, tingling or burning sensation on her extremities. Taking her medications regularly. She has not seen the Ophthalmologist recently. She has not seen Podiatrist recently.

## 2020-06-19 ENCOUNTER — APPOINTMENT (OUTPATIENT)
Dept: ORTHOPEDIC SURGERY | Facility: CLINIC | Age: 80
End: 2020-06-19

## 2020-07-13 ENCOUNTER — APPOINTMENT (OUTPATIENT)
Dept: HEPATOLOGY | Facility: CLINIC | Age: 80
End: 2020-07-13

## 2020-08-25 ENCOUNTER — APPOINTMENT (OUTPATIENT)
Dept: RHEUMATOLOGY | Facility: CLINIC | Age: 80
End: 2020-08-25
Payer: MEDICAID

## 2020-08-25 ENCOUNTER — LABORATORY RESULT (OUTPATIENT)
Age: 80
End: 2020-08-25

## 2020-08-25 VITALS
BODY MASS INDEX: 30.44 KG/M2 | SYSTOLIC BLOOD PRESSURE: 118 MMHG | HEART RATE: 76 BPM | TEMPERATURE: 97.3 F | DIASTOLIC BLOOD PRESSURE: 77 MMHG | WEIGHT: 145 LBS | RESPIRATION RATE: 16 BRPM | HEIGHT: 58 IN | OXYGEN SATURATION: 99 %

## 2020-08-25 DIAGNOSIS — M19.019 PRIMARY OSTEOARTHRITIS, UNSPECIFIED SHOULDER: ICD-10-CM

## 2020-08-25 DIAGNOSIS — R31.9 HEMATURIA, UNSPECIFIED: ICD-10-CM

## 2020-08-25 DIAGNOSIS — R76.8 OTHER SPECIFIED ABNORMAL IMMUNOLOGICAL FINDINGS IN SERUM: ICD-10-CM

## 2020-08-25 PROCEDURE — 99213 OFFICE O/P EST LOW 20 MIN: CPT | Mod: 25

## 2020-08-25 PROCEDURE — 20610 DRAIN/INJ JOINT/BURSA W/O US: CPT | Mod: LT

## 2020-08-25 RX ORDER — LIDOCAINE HYDROCHLORIDE 10 MG/ML
1 INJECTION, SOLUTION INFILTRATION; PERINEURAL
Qty: 0 | Refills: 0 | Status: COMPLETED | OUTPATIENT
Start: 2020-08-25

## 2020-08-25 RX ORDER — METHYLPRED ACET/NACL,ISO-OS/PF 40 MG/ML
40 VIAL (ML) INJECTION
Qty: 1 | Refills: 0 | Status: COMPLETED | OUTPATIENT
Start: 2020-08-25

## 2020-08-25 RX ADMIN — Medication 0 %: at 00:00

## 2020-08-25 RX ADMIN — Medication 0 MG/ML: at 00:00

## 2020-08-26 PROBLEM — M19.019 AC (ACROMIOCLAVICULAR) ARTHRITIS: Status: ACTIVE | Noted: 2018-11-07

## 2020-08-26 PROBLEM — R31.9 HEMATURIA: Status: ACTIVE | Noted: 2020-08-26

## 2020-08-26 LAB
APPEARANCE: CLEAR
BASOPHILS # BLD AUTO: 0.06 K/UL
BASOPHILS NFR BLD AUTO: 0.9 %
BILIRUBIN URINE: NEGATIVE
BLOOD URINE: NEGATIVE
COLOR: YELLOW
CRP SERPL-MCNC: 0.17 MG/DL
EOSINOPHIL # BLD AUTO: 0.37 K/UL
EOSINOPHIL NFR BLD AUTO: 5.4 %
ERYTHROCYTE [SEDIMENTATION RATE] IN BLOOD BY WESTERGREN METHOD: 28 MM/HR
GLUCOSE QUALITATIVE U: NEGATIVE
HCT VFR BLD CALC: 43.7 %
HGB BLD-MCNC: 13.4 G/DL
IMM GRANULOCYTES NFR BLD AUTO: 0.3 %
KETONES URINE: NORMAL
LEUKOCYTE ESTERASE URINE: NEGATIVE
LYMPHOCYTES # BLD AUTO: 1.49 K/UL
LYMPHOCYTES NFR BLD AUTO: 21.6 %
MAN DIFF?: NORMAL
MCHC RBC-ENTMCNC: 28.5 PG
MCHC RBC-ENTMCNC: 30.7 GM/DL
MCV RBC AUTO: 92.8 FL
MONOCYTES # BLD AUTO: 0.52 K/UL
MONOCYTES NFR BLD AUTO: 7.5 %
NEUTROPHILS # BLD AUTO: 4.45 K/UL
NEUTROPHILS NFR BLD AUTO: 64.3 %
NITRITE URINE: NEGATIVE
PH URINE: 5.5
PLATELET # BLD AUTO: 242 K/UL
PROTEIN URINE: ABNORMAL
RBC # BLD: 4.71 M/UL
RBC # FLD: 13.2 %
SARS-COV-2 IGG SERPL IA-ACNC: 0.09 INDEX
SARS-COV-2 IGG SERPL QL IA: NEGATIVE
SPECIFIC GRAVITY URINE: 1.03
UROBILINOGEN URINE: NORMAL
WBC # FLD AUTO: 6.91 K/UL

## 2020-08-26 NOTE — PROCEDURE
[Other Date:___] : Date: [unfilled] [Patient] : the patient [Risks] : risks [Benefits] : benefits [Consent Obtained] : written consent was obtained prior to the procedure and is detailed in the patient's record [Therapeutic] : therapeutic [#1 Site: ______] : #1 site identified in the [unfilled] [Betadine] : betadine solution [25 gauge 1.5 inch] : A 25 gauge 1.5 inch needle was used [Depomedrol ___ mg] : Depomedrol [unfilled] mg [___ml 1% Lidocaine] : [unfilled] ml of 1% lidocaine [Tolerated Well] : the patient tolerated the procedure well [No Complications] : there were no complications [Patient Instructed to Call] : patient was instructed to call if redness at site, a decrease in range of motion or an increase in pain is noted after procedure.

## 2020-08-26 NOTE — HISTORY OF PRESENT ILLNESS
[FreeTextEntry1] : Patient explains moderate tenderness over the left shoulder bursa over the last few months making ADL's difficult with arm raises overhead and reaching for back.    She reports continued PT for the R shoulder s/p  shoulder arthroscopic procedure /decompression.  Patient tolerating HCQ , applying sunscreen and sun protection.   Patient otherwise denies photosensitivity, sicca symptoms or rash.  \par She otherwise denies visual disturbances, oral ulcers, shortness of breath, chest pain, motor/sensory disturbances, Raynauds or fever.\par

## 2020-08-26 NOTE — ASSESSMENT
[FreeTextEntry1] : Patient with cervical DJD  with supraspinatus tear s/p repair, with L SAB, bicipital tendinitis,  +dsDNA Abs :\par \par Patient to c/w anti-malarial therapy such as Hydroxychloroquine (HCQ) to address migratory arthralgias in the setting of SLE serologies before considering disease modifying agents such as MTX.   Explained importance of health screening in the setting of ds DNA Abs in this age group. She understands the importance of yearly Opthalmology screens for visual screen testing to assess for retinal toxicity.  She understands the importance of sun avoidance and the application of SPF protection as well as the use of wide brim hats.  Recent UA with hematuria/proteuria; Nephrology referral given.  PT continued for b/l shoulders.  She currently does not have other systemic symptoms.  Topical anti-inflammatory given for pain relief. PT, ROM and stretching exercises for tendinitis demonstrated.  Cortisone injection given to the L SAB.\par Recommend paraffin wax and glucosamine supplementation for hand OA.\par Patient will continue with lifestyle and exercise modification to address fatty liver and cardiovascular health. \par She will continue on Calcium and VitD at the recommended doses of 1200mg and 800IU daily, respectively.  We reviewed calcium and VitD enriched foods as well.\par \par \par She is in agreement with the above plan and will return in three month' time.\par  \par

## 2020-08-26 NOTE — CONSULT LETTER
[Dear  ___] : Dear  [unfilled], [Courtesy Letter:] : I had the pleasure of seeing your patient, [unfilled], in my office today. [Please see my note below.] : Please see my note below. [Consult Closing:] : Thank you very much for allowing me to participate in the care of this patient.  If you have any questions, please do not hesitate to contact me. [Sincerely,] : Sincerely, [FreeTextEntry2] : Yenny Morales MD [FreeTextEntry3] : Susanna Bui M.D.\par  of Medicine \par [facility]/[place]andra\par \par

## 2020-08-26 NOTE — REASON FOR VISIT
[Follow-Up: _____] : a [unfilled] follow-up visit [[employer] ] : provided by [employer]   [FreeTextEntry1] : [id number] [TWNoteComboBox1] : Dutch [FreeTextEntry2] : Viry

## 2020-09-09 ENCOUNTER — APPOINTMENT (OUTPATIENT)
Dept: INTERNAL MEDICINE | Facility: CLINIC | Age: 80
End: 2020-09-09
Payer: MEDICAID

## 2020-09-09 VITALS
BODY MASS INDEX: 29.81 KG/M2 | HEART RATE: 94 BPM | HEIGHT: 58 IN | TEMPERATURE: 98.1 F | SYSTOLIC BLOOD PRESSURE: 127 MMHG | OXYGEN SATURATION: 98 % | DIASTOLIC BLOOD PRESSURE: 68 MMHG | WEIGHT: 142 LBS

## 2020-09-09 PROCEDURE — 99204 OFFICE O/P NEW MOD 45 MIN: CPT

## 2020-09-09 RX ORDER — MULTIVITAMIN WITH MINERALS
TABLET ORAL DAILY
Qty: 90 | Refills: 3 | Status: ACTIVE | COMMUNITY
Start: 2020-09-09 | End: 1900-01-01

## 2020-09-09 NOTE — HISTORY OF PRESENT ILLNESS
[Heartburn] : denies heartburn [Diarrhea] : denies diarrhea [Vomiting] : denies vomiting [Nausea] : denies nausea [Yellow Skin Or Eyes (Jaundice)] : denies jaundice [Abdominal Pain] : denies abdominal pain [Rectal Pain] : denies rectal pain [Abdominal Swelling] : denies abdominal swelling [Wt Loss ___ Lbs] : recent [unfilled] ~Upound(s) weight loss [Constipation] : constipation [GERD] : gastroesophageal reflux disease [de-identified] : Pt  has seen hepatology for fatty liver .  she had us of abd recently  and had dilated pancreatic duct 6 mmm and has calcifications and atrophic pancreatic body and tail and stable.  she is a diabetic and has lupus and hyperlipidemia and is being followed  by Dr Bui and Dr Stanley regularly and her pcp.   TP states she has increased gas no nausea or vomiting and it occurs after eating.  She doesn’t have difficulty swallowing her foods  and is eating well. She has lost 3 lbs.  No diarrhea or floating stools or black stools or tarry stools. . she has been feeling stress lately with pandemic and had constipation and on toilet tissue blood in May and since then she has one to two bm a day and has not had blood.  she has internal hemorrhoids on colonoscopy in 2018 .  She has pancreatic atrophy and might have mild pancreatic insuff . [Wt Gain ___ Lbs] : no recent weight gain

## 2020-09-09 NOTE — CONSULT LETTER
[Courtesy Letter:] : I had the pleasure of seeing your patient, [unfilled], in my office today. [Please see my note below.] : Please see my note below. [Dear  ___] : Dear  [unfilled], [Sincerely,] : Sincerely, [FreeTextEntry3] : [facility]

## 2020-09-09 NOTE — END OF VISIT
[FreeTextEntry2] : resident present for visit [>50% of the face to face encounter time was spent on counseling and/or coordination of care for ___] : Greater than 50% of the face to face encounter time was spent on counseling and/or coordination of care for [unfilled]

## 2020-09-09 NOTE — ASSESSMENT
[FreeTextEntry1] : 1 gassiness -  Most foods that contain carbohydrates can cause gas. By contrast, fats and proteins cause little gas (although certain proteins may intensify the odor of gas).\par \par Sugars\par \par The sugars that cause gas are raffinose, lactose, fructose, and sorbitol.\par •Raffinose — Beans contain large amounts of this complex sugar. Smaller amounts are found in cabbage, [place] sprouts, broccoli, asparagus, other vegetables, and whole grains.\par •Lactose — Lactose is the natural sugar in milk. It is also found in milk products, such as cheese and ice cream, and processed foods, such as bread, cereal, and salad dressing. Many people, particularly those of , , or  background, have low levels of the enzyme lactase needed to digest lactose. Also, as people age, their enzyme levels decrease. As a result, over time people may experience increasing amounts of gas after eating food containing lactose.\par •Fructose — Fructose is naturally present in onions, artichokes, pears, and wheat. It is also used as a sweetener in some soft drinks and fruit drinks.\par •Sorbitol — Sorbitol is a sugar found naturally in fruits, including apples, pears, peaches, and prunes. It is also used as an artificial sweetener in many dietetic foods and sugarfree candies and gums.\par \par Starches\par \par Most starches, including potatoes, corn, noodles, and wheat, produce gas as they are broken down in the large intestine. Rice is the only starch that does not cause gas.\par \par Fiber\par \par Dietary fiber is carbohydrate that is indigestible in the small intestine and reaches the colon relatively intact. In the colon, certain bacteria digest fiber (fermentation), which produces gas. Dietary fiber can be classified as either soluble or insoluble.\par \par Soluble fiber dissolves in water and becomes a soft gel. It is found in oat bran, beans, barley, nuts, seeds, lentils, peas, and most fruits. Insoluble fiber does not dissolve or gel in water. It absorbs liquid and adds bulk to stool. Cellulose (found in legumes, seeds, root vegetables, and vegetables in the cabbage family), wheat bran, and corn bran are examples of insoluble fiber.\par \par High fiber substances containing both soluble and insoluble fibers have the properties of both. They include oat bran, psyllium, and soy fiber. Methylcellulose is a semi-synthetic fiber. It is soluble and gel forming, but not fermentable.\par \par Types of fiber differ in the speed and extent to which they are digested in the GI tract, and in the process of fermentation. The solubility and fermentation of a particular fiber affects how it is handled in the GI tract. However, the effect of identical fibers varies from person to person.\par \par A gradual increase in dietary fiber can modify and improve symptoms. But individual responses vary and too much of a type of fiber can worsen symptoms. It may be necessary to try different types of fiber. With any dietary fiber it is best to start low and go slow.\par 2. gerd - discussed Rule of 2's; pt should avoid eating too much; too fast; too spicy; too lousy; less than two hours before bed \par -Things to avoid including overeating, spicy foods, tight clothing, eating within three hours of bed, this list is not all inclusive. \par -For treatment of reflux, possible options discussed including diet control, H2 blockers, PPIs, as well as coating motility agents discussed as treatment options. Timing of meals and proximity of last meal to sleep were discussed. If symptoms persist, a formal gastrointestinal evaluation is needed. \par \par 3 pancreatic duct dilatation stable.  \par 4 nafld- Fatty Liver: The patient denies any jaundice or pruritus. The patient denies any alcohol use. The patient denies taking large doses of nonsteroidal anti-inflammatory drugs or acetaminophen. The findings are suggestive of fatty liver. The patient and I had a long discussion regarding the risks of fatty liver progressing to cirrhosis. The patient was told of the possible increased risk of developing liver failure, cirrhosis, ascites, GI bleeding secondary to varices, hepatic encephalopathy, bleeding tendencies and liver cancer. The patient was told of the importance of follow-up. The patient was advised to follow up every 6 months for blood work and imaging studies. The patient agreed and will follow up. The patient was advised to lose weight. I recommend a trial of vitamin E supplementation for the fatty liver. If the liver enzymes remain elevated, the patient may require a trial of Pioglitazone for the fatty liver. I recommend avoid alcohol and hepato-toxic agents. The patient was also advised to avoid NSAIDs, Acetaminophen and any other hepatotoxic drugs. The patient was also advised not to share needles, razors, scissors, nail clippers, etc.. The patient is to continue close follow-up in our office for blood work and exams. If the liver enzymes remain elevated, the patient may require a CT guided liver biopsy to assess the liver parenchyma and for possible treatment. We had a long discussion regarding the risks and benefits of the procedure. The patient was told of the risks of bleeding, perforation, infections, emergency surgery and missing lesions. The patient agreed and will follow-up to reassess the symptoms.\par   pancreatic atrophy - Pancreatic atrophy is non-specific and is common in elderly patients, although in younger patients it can be a hallmark of pathology. Most commonly it is associated with aging, obesity and end-stage chronic pancreatitis.\par MRI evidence of atrophy is characterized by fatty infiltration or fibrosis of the pancreas. Based on the patient's lack of symptoms, no treatment was initiated. If he developed signs of exocrine pancreatic insufficiency, treatment with pancreatic enzyme replacement would be warranted.Worst foods for pancreatitis\par Red meat.\par Organ meat.\par French fries, potato chips.\par Mayonnaise.\par Margarine, butter.\par Full-fat dairy.\par Pastries.\par Sugary drinks.Ginger is a good source of antioxidant and therefore may be capable of preventing tissue damage, it can protect the pancreas tissues from lipid peroxidation on diabetic rats [41,42].esearch published in the May-June 2013 issue of the journal Pancreatology found that people with EPI are often deficient in fat-soluble vitamins A, D, E, and K. Your doctor may also suggest taking selenium and antioxidants like vitamin C, which could help decrease inflammation,  I will also do magnesium levels and vitamin levels to check for def .

## 2020-09-09 NOTE — PHYSICAL EXAM
[General Appearance - Alert] : alert [General Appearance - In No Acute Distress] : in no acute distress [Sclera] : the sclera and conjunctiva were normal [PERRL With Normal Accommodation] : pupils were equal in size, round, and reactive to light [Oropharynx] : the oropharynx was normal [Extraocular Movements] : extraocular movements were intact [] : no respiratory distress [Auscultation Breath Sounds / Voice Sounds] : lungs were clear to auscultation bilaterally [Apical Impulse] : the apical impulse was normal [Heart Rate And Rhythm] : heart rate was normal and rhythm regular [Heart Sounds] : normal S1 and S2 [Normal] : normal [Edema] : there was no peripheral edema [Soft, Nontender] : the abdomen was soft and nontender [No HSM] : no hepatosplenomegaly noted [No Mass] : no masses were palpated [Cervical Lymph Nodes Enlarged Posterior Bilaterally] : posterior cervical [Cervical Lymph Nodes Enlarged Anterior Bilaterally] : anterior cervical [No CVA Tenderness] : no ~M costovertebral angle tenderness [Abnormal Walk] : normal gait [Nail Clubbing] : no clubbing  or cyanosis of the fingernails [Motor Tone] : muscle strength and tone were normal [Musculoskeletal - Swelling] : no joint swelling seen [Skin Turgor] : normal skin turgor [Oriented To Time, Place, And Person] : oriented to person, place, and time [Impaired Insight] : insight and judgment were intact [Affect] : the affect was normal

## 2020-09-11 LAB
25(OH)D3 SERPL-MCNC: 33.2 NG/ML
AMYLASE/CREAT SERPL: 76 U/L
LPL SERPL-CCNC: 32 U/L
MAGNESIUM SERPL-MCNC: 1.5 MG/DL

## 2020-09-13 LAB — SELENIUM SERPL-MCNC: 177 UG/L

## 2020-09-14 LAB — VIT A SERPL-MCNC: 62.5 UG/DL

## 2020-10-09 ENCOUNTER — RX RENEWAL (OUTPATIENT)
Age: 80
End: 2020-10-09

## 2020-10-21 LAB
ALBUMIN SERPL ELPH-MCNC: 4.6 G/DL
ALP BLD-CCNC: 88 U/L
ALT SERPL-CCNC: 13 U/L
ANION GAP SERPL CALC-SCNC: 12 MMOL/L
AST SERPL-CCNC: 16 U/L
BILIRUB DIRECT SERPL-MCNC: 0.1 MG/DL
BILIRUB INDIRECT SERPL-MCNC: 0.3 MG/DL
BILIRUB SERPL-MCNC: 0.5 MG/DL
BUN SERPL-MCNC: 18 MG/DL
CALCIUM SERPL-MCNC: 9.8 MG/DL
CHLORIDE SERPL-SCNC: 102 MMOL/L
CHOLEST SERPL-MCNC: 128 MG/DL
CO2 SERPL-SCNC: 29 MMOL/L
CREAT SERPL-MCNC: 0.74 MG/DL
GLUCOSE SERPL-MCNC: 134 MG/DL
HDLC SERPL-MCNC: 47 MG/DL
LDLC SERPL CALC-MCNC: 60 MG/DL
NONHDLC SERPL-MCNC: 81 MG/DL
POTASSIUM SERPL-SCNC: 5 MMOL/L
PROT SERPL-MCNC: 7 G/DL
SODIUM SERPL-SCNC: 142 MMOL/L
TRIGL SERPL-MCNC: 107 MG/DL

## 2020-10-22 LAB
CREAT SPEC-SCNC: 79 MG/DL
ESTIMATED AVERAGE GLUCOSE: 160 MG/DL
FRUCTOSAMINE SERPL-MCNC: 284 UMOL/L
GLUCOSE BS SERPL-MCNC: 134 MG/DL
HBA1C MFR BLD HPLC: 7.2 %
MICROALBUMIN 24H UR DL<=1MG/L-MCNC: <1.2 MG/DL
MICROALBUMIN/CREAT 24H UR-RTO: NORMAL MG/G

## 2020-10-28 ENCOUNTER — APPOINTMENT (OUTPATIENT)
Dept: ENDOCRINOLOGY | Facility: CLINIC | Age: 80
End: 2020-10-28
Payer: MEDICAID

## 2020-10-28 VITALS
HEART RATE: 82 BPM | RESPIRATION RATE: 16 BRPM | SYSTOLIC BLOOD PRESSURE: 151 MMHG | BODY MASS INDEX: 30.44 KG/M2 | HEIGHT: 58 IN | TEMPERATURE: 97.2 F | WEIGHT: 145 LBS | DIASTOLIC BLOOD PRESSURE: 93 MMHG | OXYGEN SATURATION: 98 %

## 2020-10-28 LAB — GLUCOSE BLDC GLUCOMTR-MCNC: 134

## 2020-10-28 PROCEDURE — 99214 OFFICE O/P EST MOD 30 MIN: CPT | Mod: 25

## 2020-10-28 PROCEDURE — 82962 GLUCOSE BLOOD TEST: CPT

## 2020-10-28 PROCEDURE — 99072 ADDL SUPL MATRL&STAF TM PHE: CPT

## 2020-10-28 NOTE — PHYSICAL EXAM
[Alert] : alert [No Acute Distress] : no acute distress [No Neck Mass] : no neck mass was observed [No Respiratory Distress] : no respiratory distress [Clear to Auscultation] : lungs were clear to auscultation bilaterally [Normal PMI] : the apical impulse was normal [Normal Rate] : heart rate was normal [Thyroid Not Enlarged] : the thyroid was not enlarged

## 2020-10-28 NOTE — ASSESSMENT
[FreeTextEntry1] : The diabetic control is adequate\par She has gained weight\par Advised to see Nutritionist\par Will continue same treatment

## 2020-10-28 NOTE — HISTORY OF PRESENT ILLNESS
[FreeTextEntry1] : Patient feels well , she is asymptomatic. Her weight has increased slightly. She is not exercising regularly but following the diet. Her blood glucose at home are well controlled, they are usually around 130 mg/dl. The FBS in the laboratory was 134 mg/dl and the HbA1c was 7.2 %, improved/unchanged/increased since last visit. The renal function is within normal limits, the microalbumin in the urine was normal. The lipid panel was within normal limits. She denies low blood glucose during the night. She denies chest pain, or SOB. Denies numbness, tingling or burning sensation on her extremities. Taking her medications regularly. She has seen the Ophthalmologist recently. She has not seen Podiatrist recently. She has not seen the Cardiologist recently.\par

## 2020-11-04 ENCOUNTER — APPOINTMENT (OUTPATIENT)
Dept: ENDOCRINOLOGY | Facility: CLINIC | Age: 80
End: 2020-11-04
Payer: MEDICAID

## 2020-11-04 PROCEDURE — G0108 DIAB MANAGE TRN  PER INDIV: CPT

## 2020-11-04 PROCEDURE — 99072 ADDL SUPL MATRL&STAF TM PHE: CPT

## 2020-11-17 ENCOUNTER — APPOINTMENT (OUTPATIENT)
Dept: RHEUMATOLOGY | Facility: CLINIC | Age: 80
End: 2020-11-17
Payer: MEDICAID

## 2020-11-17 VITALS
TEMPERATURE: 98 F | BODY MASS INDEX: 30.64 KG/M2 | HEART RATE: 91 BPM | SYSTOLIC BLOOD PRESSURE: 138 MMHG | DIASTOLIC BLOOD PRESSURE: 76 MMHG | WEIGHT: 146 LBS | RESPIRATION RATE: 16 BRPM | OXYGEN SATURATION: 99 % | HEIGHT: 58 IN

## 2020-11-17 PROCEDURE — 99213 OFFICE O/P EST LOW 20 MIN: CPT | Mod: 25

## 2020-11-17 RX ORDER — ALCOHOL ANTISEPTIC PADS
PADS, MEDICATED (EA) TOPICAL
Qty: 90 | Refills: 3 | Status: COMPLETED | COMMUNITY
Start: 2020-07-09 | End: 2020-11-17

## 2020-11-17 NOTE — ASSESSMENT
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   \par Explained importance of health screening in the setting of ds DNA Abs in this age group. She understands the importance of yearly Opthalmology screens for visual screen testing to assess for retinal toxicity.  She understands the importance of sun avoidance and the application of SPF protection as well as the use of wide brim hats.  Recent UA with hematuria/proteuria; Nephrology f/u in two weeks.   \par PT continued for b/l shoulders.  She currently does not have other systemic symptoms.  Topical anti-inflammatory given for pain relief. PT, ROM and stretching exercises for tendinitis demonstrated.  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 \par She will continue on Calcium and VitD at the recommended doses of 1200mg and 800IU daily, respectively.  We reviewed calcium and VitD enriched foods as well.\par \par \par She is in agreement with the above plan and will return in three month' time.\par  \par

## 2020-11-17 NOTE — HISTORY OF PRESENT ILLNESS
[FreeTextEntry1] : Patient reports pain in the muscles , shoulders are quiescent , especially L SAB since injection in August 2020.  Patient's recent HbA1c of 7.2.  Patient explains dietary modification under guidance of Nutritionist.  She admits to minimal exercise modification.  Patient had initial testing with Nephrology team for proteinuria and has f/u in two weeks.  She finds minimal joint swelling or paresthesias though finds LE muscle tightness to be bothersome.  She denies difficulty in arising from a seated position.  She denies weakness or tripping over feet.  Patient further denies photosensitivity, rash  or visual disturbances .  \par

## 2020-11-17 NOTE — CONSULT LETTER
[Dear  ___] : Dear  [unfilled], [Courtesy Letter:] : I had the pleasure of seeing your patient, [unfilled], in my office today. [Please see my note below.] : Please see my note below. [Consult Closing:] : Thank you very much for allowing me to participate in the care of this patient.  If you have any questions, please do not hesitate to contact me. [Sincerely,] : Sincerely, [FreeTextEntry2] : Yenny Morales MD \par  [FreeTextEntry3] : Susanna Bui M.D.\par  of Medicine \par [facility] [employer] [facility]/[place]\par \par

## 2020-11-17 NOTE — REASON FOR VISIT
[Follow-Up: _____] : a [unfilled] follow-up visit [[employer] ] : provided by [employer]   [FreeTextEntry1] : [id number] [TWNoteComboBox1] : [place]

## 2020-11-17 NOTE — PHYSICAL EXAM
[General Appearance - Alert] : alert [General Appearance - In No Acute Distress] : in no acute distress [Sclera] : the sclera and conjunctiva were normal [Examination Of The Oral Cavity] : the lips and gums were normal [Oropharynx] : the oropharynx was normal [Heart Rate And Rhythm] : heart rate was normal and rhythm regular [Edema] : there was no peripheral edema [No Spinal Tenderness] : no spinal tenderness [Abnormal Walk] : normal gait [Musculoskeletal - Swelling] : no joint swelling seen [Motor Tone] : muscle strength and tone were normal [] : no rash [Motor Exam] : the motor exam was normal [Oriented To Time, Place, And Person] : oriented to person, place, and time [Impaired Insight] : insight and judgment were intact [FreeTextEntry1] : Varicose veins over the LE

## 2020-12-02 ENCOUNTER — APPOINTMENT (OUTPATIENT)
Dept: ORTHOPEDIC SURGERY | Facility: CLINIC | Age: 80
End: 2020-12-02
Payer: MEDICAID

## 2020-12-02 DIAGNOSIS — Z98.890 OTHER SPECIFIED POSTPROCEDURAL STATES: ICD-10-CM

## 2020-12-02 DIAGNOSIS — M75.21 BICIPITAL TENDINITIS, RIGHT SHOULDER: ICD-10-CM

## 2020-12-02 DIAGNOSIS — M75.121 COMPLETE ROTATOR CUFF TEAR OR RUPTURE OF RIGHT SHOULDER, NOT SPECIFIED AS TRAUMATIC: ICD-10-CM

## 2020-12-02 LAB
APPEARANCE: CLEAR
BASOPHILS # BLD AUTO: 0.05 K/UL
BASOPHILS NFR BLD AUTO: 1 %
BILIRUBIN URINE: NEGATIVE
BLOOD URINE: NEGATIVE
CK SERPL-CCNC: 67 U/L
COLOR: NORMAL
CRP SERPL-MCNC: 0.16 MG/DL
DSDNA AB SER-ACNC: 19 IU/ML
EOSINOPHIL # BLD AUTO: 0.29 K/UL
EOSINOPHIL NFR BLD AUTO: 6 %
ERYTHROCYTE [SEDIMENTATION RATE] IN BLOOD BY WESTERGREN METHOD: 8 MM/HR
GLUCOSE QUALITATIVE U: NEGATIVE
HCT VFR BLD CALC: 41.9 %
HGB BLD-MCNC: 13.1 G/DL
IMM GRANULOCYTES NFR BLD AUTO: 0.4 %
KETONES URINE: NEGATIVE
LEUKOCYTE ESTERASE URINE: NEGATIVE
LYMPHOCYTES # BLD AUTO: 1.17 K/UL
LYMPHOCYTES NFR BLD AUTO: 24.3 %
MAN DIFF?: NORMAL
MCHC RBC-ENTMCNC: 28.3 PG
MCHC RBC-ENTMCNC: 31.3 GM/DL
MCV RBC AUTO: 90.5 FL
MONOCYTES # BLD AUTO: 0.35 K/UL
MONOCYTES NFR BLD AUTO: 7.3 %
NEUTROPHILS # BLD AUTO: 2.94 K/UL
NEUTROPHILS NFR BLD AUTO: 61 %
NITRITE URINE: NEGATIVE
PH URINE: 6
PLATELET # BLD AUTO: 223 K/UL
PROTEIN URINE: NEGATIVE
RBC # BLD: 4.63 M/UL
RBC # FLD: 12.4 %
SARS-COV-2 IGG SERPL IA-ACNC: 0.01 INDEX
SARS-COV-2 IGG SERPL QL IA: NEGATIVE
SPECIFIC GRAVITY URINE: 1.02
UROBILINOGEN URINE: NORMAL
WBC # FLD AUTO: 4.82 K/UL

## 2020-12-02 PROCEDURE — 99212 OFFICE O/P EST SF 10 MIN: CPT

## 2020-12-02 PROCEDURE — 99072 ADDL SUPL MATRL&STAF TM PHE: CPT

## 2020-12-02 NOTE — HISTORY OF PRESENT ILLNESS
[de-identified] : Patient is status post Right Shoulder Arthroscopic surgery on 4-11-19\par Two anchor rotator cuff repair with rotation medical patch\par  [ Subacromial decompression ]\par  \par The patient is doing well with none at rest. has mild 3/10 w extensive lifting. improving rom\par \par Review of Systems is positive for the above musculoskeletal complaints as noted in the HPI above and is otherwise non-contributory for general, constitutional, psychiatric, neurologic, lymph/skin, HEENT, cardiac, respiratory, gastrointestinal, and reproductive systems. Please see intake form.

## 2020-12-02 NOTE — DISCUSSION/SUMMARY
[de-identified] : status post Right Shoulder Arthroscopic surgery on 4-11-19\par Two anchor rotator cuff repair with rotation medical patch\par  [ Subacromial decompression ]\par \par recommend PT\par \par will do home exercieses\par \par Follow up:\par 6 mo - 1 year

## 2020-12-02 NOTE — PHYSICAL EXAM
[de-identified] : Physical Examination\par General: well nourished, in no acute distress, alert and oriented to person, place and time\par Psychiatric: normal mood and affect, no abnormal movements or speech patterns\par Eyes: vision intact without glasses\par Throat: no thyromegaly\par Lymph: no enlarged nodes, no lymphedema in extremity\par Respiratory: no wheezing, no shortness of breath with ambulation\par Cardiac: no cardiac leg swelling, 2+ peripheral pulses\par Neurology: normal gross sensation in extremities to light touch\par Abdomen: soft, non-tender, tympanic, no masses\par \par Musculoskeletal Examination\par Cervical spine	Full painless range of motion and negative Spurling's test\par \par Shoulder			Right			Left\par Appearance\par      Skin/Swelling/Deformity	healed incisions			normal\par      Scapular Winging		-			-\par Range of Motion\par      Forward Flexion		170/170		170 / 170\par      Abduction			160 / 160		170 / 170\par      External Rotation		45			45\par      Internal Rotation		T10			T10\par      SAbd Ext Rotation		70			90\par      SAbd Int Rotation		60			80\par      Painful Arc			+			-\par      Crepitus			-			-\par Palpation\par      Clavicle			-			-\par      AC Joint			-			-\par      Posterior Acromion		-			-\par      Levator Scapula		-			-\par      Lateral Bursa			-			-\par      Impingement Area		-			-\par      Biceps Tendon		-			-\par      Anterior Capsule		-			-\par Strength Examination\par      Supraspinatous 		4+ / 0			5+ / 0\par      Infraspinatous			5+ / 0			5+ / 0\par      Subscapularis			5+ / 0			5+ / 0\par      Belly Press			5+ / 0			5+ / 0\par      Lift Off			-			-\par      Drop-Arm			-			-\par Special Examination\par      Biceps [place]'s		-			-\par      Impingement Neer		-			-\par      Impingement Hawking		-			-\par \par Sensation\par      Axillary			normal			normal\par      LatAntCubBrach 		normal			normal\par      Median 			normal			normal\par      Ulnar 			normal			normal\par      Radial 			normal			normal\par Motor\par      AIN 				normal			normal\par      Ulnar 			normal			normal\par      Radial 			normal			normal\par      PIN 				normal			normal\par Pulses\par      Radial			2+			2+ [de-identified] : 4 views of the affected right shoulder (AP, Glenoid, Y-View, Axillary)\par demonstrate:\par normal bony calcification without dislocation and no fracture\par 	Arch	2B\par 	AC Joint	moderate Arthrosis with widened joints and small clavicular cysts\par 	GH Joint	trace Arthrosis\par 	Calcifications	none\par \par \par MRI shoulder dated 10-10-18 from [facility]\par My impression of the images:\par Quality of the MRI is ok\par Supraspinatous Tendon full thickness tear retracted to two thirds the distance between the footprint and the glenoid\par Infraspinatous Tendon full-thickness tear retracted to two thirds of the distance from the footprint of the glenoid\par Subscapularis Tendon ok\par Teres Minor Tendon ok\par Muscle Belly Atrophy moderate\par Biceps Tendon is  in the groove surrounded significant fluid and looks intra-articularly but poorly visualized with a stable attachment anchor\par Superior Labrum ok\par Anterior Labrum ok\par Posterior Labrum ok\par AC joint mild signal\par There is no full thickness chondral lesion of the glenoid and humeral head\par \par \par The Final Radiologist Impression:\par Complete full-thickness tear the supraspinatus tendon just proximal to its from for insertion with retraction of the torn free edge the tendon to the 1:00 position along the superior medial humeral head. There is mild associated atrophy of the supraspinatus muscle belly without significant fatty infiltration.\par Moderate infraspinatus and subscapularis tendinopathy.\par Mild subacromial subdeltoid bursitis.\par Mild to moderate long head of biceps tenosynovitis.\par Moderate acromioclavicular joint osteoarthritis\par \par \par 4 views of the affected right shoulder (AP, Glenoid, Y-View, Axillary)\par 3-2019\par demonstrate:\par normal bony calcification without dislocation and no fracture\par 	Arch	2B\par 	AC Joint	moderate Arthrosis\par 	GH Joint	trace Arthrosis\par 	Calcifications	none\par

## 2021-01-06 ENCOUNTER — RX RENEWAL (OUTPATIENT)
Age: 81
End: 2021-01-06

## 2021-01-14 DIAGNOSIS — Z00.00 ENCOUNTER FOR GENERAL ADULT MEDICAL EXAMINATION W/OUT ABNORMAL FINDINGS: ICD-10-CM

## 2021-01-18 ENCOUNTER — TRANSCRIPTION ENCOUNTER (OUTPATIENT)
Age: 81
End: 2021-01-18

## 2021-01-20 ENCOUNTER — TRANSCRIPTION ENCOUNTER (OUTPATIENT)
Age: 81
End: 2021-01-20

## 2021-01-20 LAB — SARS-COV-2 N GENE NPH QL NAA+PROBE: NOT DETECTED

## 2021-02-05 ENCOUNTER — TRANSCRIPTION ENCOUNTER (OUTPATIENT)
Age: 81
End: 2021-02-05

## 2021-02-23 LAB
ALBUMIN SERPL ELPH-MCNC: 4.4 G/DL
ALP BLD-CCNC: 86 U/L
ALT SERPL-CCNC: 11 U/L
ANION GAP SERPL CALC-SCNC: 14 MMOL/L
AST SERPL-CCNC: 14 U/L
BILIRUB DIRECT SERPL-MCNC: 0.1 MG/DL
BILIRUB INDIRECT SERPL-MCNC: 0.3 MG/DL
BILIRUB SERPL-MCNC: 0.4 MG/DL
BUN SERPL-MCNC: 21 MG/DL
CALCIUM SERPL-MCNC: 10.1 MG/DL
CHLORIDE SERPL-SCNC: 101 MMOL/L
CHOLEST SERPL-MCNC: 140 MG/DL
CO2 SERPL-SCNC: 27 MMOL/L
CREAT SERPL-MCNC: 0.81 MG/DL
CREAT SPEC-SCNC: 48 MG/DL
FRUCTOSAMINE SERPL-MCNC: 275 UMOL/L
GLUCOSE BS SERPL-MCNC: 121 MG/DL
GLUCOSE SERPL-MCNC: 120 MG/DL
HDLC SERPL-MCNC: 49 MG/DL
LDLC SERPL CALC-MCNC: 67 MG/DL
MICROALBUMIN 24H UR DL<=1MG/L-MCNC: <1.2 MG/DL
MICROALBUMIN/CREAT 24H UR-RTO: NORMAL MG/G
NONHDLC SERPL-MCNC: 92 MG/DL
POTASSIUM SERPL-SCNC: 4.8 MMOL/L
PROT SERPL-MCNC: 7.1 G/DL
SODIUM SERPL-SCNC: 142 MMOL/L
T4 FREE SERPL-MCNC: 1.3 NG/DL
TRIGL SERPL-MCNC: 124 MG/DL
TSH SERPL-ACNC: 2.19 UIU/ML

## 2021-02-27 LAB
ESTIMATED AVERAGE GLUCOSE: 166 MG/DL
HBA1C MFR BLD HPLC: 7.4 %

## 2021-03-01 ENCOUNTER — APPOINTMENT (OUTPATIENT)
Dept: ENDOCRINOLOGY | Facility: CLINIC | Age: 81
End: 2021-03-01
Payer: MEDICAID

## 2021-03-01 VITALS
WEIGHT: 144 LBS | BODY MASS INDEX: 30.23 KG/M2 | HEIGHT: 58 IN | RESPIRATION RATE: 16 BRPM | DIASTOLIC BLOOD PRESSURE: 86 MMHG | HEART RATE: 85 BPM | OXYGEN SATURATION: 98 % | SYSTOLIC BLOOD PRESSURE: 137 MMHG | TEMPERATURE: 97.6 F

## 2021-03-01 LAB — GLUCOSE BLDC GLUCOMTR-MCNC: 166

## 2021-03-01 PROCEDURE — 99072 ADDL SUPL MATRL&STAF TM PHE: CPT

## 2021-03-01 PROCEDURE — 99214 OFFICE O/P EST MOD 30 MIN: CPT | Mod: 25

## 2021-03-01 PROCEDURE — 82962 GLUCOSE BLOOD TEST: CPT

## 2021-03-01 NOTE — HISTORY OF PRESENT ILLNESS
[FreeTextEntry1] : Patient feels well , she is asymptomatic. Her weight has not changed. She is under stress. She is exercising regularly and following the diet. Her blood glucose at home are well controlled, they are usually around 100 to 120 mg/dl. The FBS in the laboratory was _ mg/dl and the HbA1c was 7.4 % increased since last visit. The renal function is within normal limits, the microalbumin in the urine was normal. The lipid panel was within normal limits. She denies low blood glucose during the night. She denies chest pain, or SOB. Denies numbness, tingling or burning sensation on her extremities. Taking her medications regularly. She has not seen the Ophthalmologist recently. She has seen Podiatrist recently. She has not seen the Cardiologist recently.\par

## 2021-03-01 NOTE — ASSESSMENT
[FreeTextEntry1] : The diabetes has deteriorated slightly\par Advised to continue the low CHO diet\par Advised to walk regularly\par Will continue the same treatment\par Next visit if the BS does not improve will add another medication

## 2021-03-01 NOTE — DATA REVIEWED
[FreeTextEntry1] : The FBS and HbA1c have deteriorated slightly. The lipid panel was fine except for the HDL-C that was slightly low. The thyroid tests were fine.

## 2021-03-09 ENCOUNTER — APPOINTMENT (OUTPATIENT)
Dept: INTERNAL MEDICINE | Facility: CLINIC | Age: 81
End: 2021-03-09
Payer: MEDICAID

## 2021-03-09 VITALS
SYSTOLIC BLOOD PRESSURE: 129 MMHG | TEMPERATURE: 97.1 F | OXYGEN SATURATION: 97 % | HEIGHT: 57.5 IN | DIASTOLIC BLOOD PRESSURE: 75 MMHG | HEART RATE: 89 BPM | BODY MASS INDEX: 30.01 KG/M2 | WEIGHT: 141 LBS

## 2021-03-09 DIAGNOSIS — Z23 ENCOUNTER FOR IMMUNIZATION: ICD-10-CM

## 2021-03-09 PROCEDURE — 99214 OFFICE O/P EST MOD 30 MIN: CPT

## 2021-03-09 PROCEDURE — 99072 ADDL SUPL MATRL&STAF TM PHE: CPT

## 2021-03-09 RX ORDER — DICLOFENAC SODIUM 10 MG/G
1 GEL TOPICAL
Qty: 100 | Refills: 2 | Status: COMPLETED | COMMUNITY
Start: 2017-04-20 | End: 2021-03-09

## 2021-03-09 NOTE — HISTORY OF PRESENT ILLNESS
[Wt Loss ___ Lbs] : recent [unfilled] ~Upound(s) weight loss [GERD] : no gastroesophageal reflux disease [Hiatus Hernia] : no hiatus hernia [Peptic Ulcer Disease] : no peptic ulcer disease [Cholelithiasis] : no cholelithiasis [Kidney Stone] : no kidney stone [Inflammatory Bowel Disease] : no inflammatory bowel disease [Irritable Bowel Syndrome] : no irritable bowel syndrome [Diverticulitis] : no diverticulitis [Alcohol Abuse] : no alcohol abuse [Malignancy] : no malignancy [de-identified] : Pt continues to have gas after one to two hours  she has increased belching and flatus .  this occurs for one hour . this occurs with certain foods such as vegetables and certain chicken and meats which is flavored heavily.  It occurs varying amount of time depending on what she eats.  It occur twice a day or only on weekends.  She has been taking her magnesium.  She has not been taking lactaid regularly  When she takes it she doesn’t have the gas.  she had testing for pancreatic function and her trypsinogen is normal her lipase amylase are normal.  she is presently alos being treated for lupus by rheumatologist.

## 2021-03-09 NOTE — ASSESSMENT
[FreeTextEntry1] : hypomagnesium She is taking her medication and will fu her levels \par 2.  gassiness she has not regularly taken lactaid and states she doesn’t have ignacio when she takes it,.  She was asked to come in on a 12 hour  We discussed foods to avoid that can produce gas as well.   Most foods that contain carbohydrates can cause gas. By contrast, fats and proteins cause little gas (although certain proteins may intensify the odor of gas).\par \par Sugars\par \par The sugars that cause gas are raffinose, lactose, fructose, and sorbitol.\par •Raffinose — Beans contain large amounts of this complex sugar. Smaller amounts are found in cabbage, [place] sprouts, broccoli, asparagus, other vegetables, and whole grains.\par •Lactose — Lactose is the natural sugar in milk. It is also found in milk products, such as cheese and ice cream, and processed foods, such as bread, cereal, and salad dressing. Many people, particularly those of , , or  background, have low levels of the enzyme lactase needed to digest lactose. Also, as people age, their enzyme levels decrease. As a result, over time people may experience increasing amounts of gas after eating food containing lactose.\par •Fructose — Fructose is naturally present in onions, artichokes, pears, and wheat. It is also used as a sweetener in some soft drinks and fruit drinks.\par •Sorbitol — Sorbitol is a sugar found naturally in fruits, including apples, pears, peaches, and prunes. It is also used as an artificial sweetener in many dietetic foods and sugarfree candies and gums.\par \par Starches\par \par Most starches, including potatoes, corn, noodles, and wheat, produce gas as they are broken down in the large intestine. Rice is the only starch that does not cause gas.\par \par Fiber\par \par Dietary fiber is carbohydrate that is indigestible in the small intestine and reaches the colon relatively intact. In the colon, certain bacteria digest fiber (fermentation), which produces gas. Dietary fiber can be classified as either soluble or insoluble.\par \par Soluble fiber dissolves in water and becomes a soft gel. It is found in oat bran, beans, barley, nuts, seeds, lentils, peas, and most fruits. Insoluble fiber does not dissolve or gel in water. It absorbs liquid and adds bulk to stool. Cellulose (found in legumes, seeds, root vegetables, and vegetables in the cabbage family), wheat bran, and corn bran are examples of insoluble fiber.\par \par High fiber substances containing both soluble and insoluble fibers have the properties of both. They include oat bran, psyllium, and soy fiber. Methylcellulose is a semi-synthetic fiber. It is soluble and gel forming, but not fermentable.\par \par Types of fiber differ in the speed and extent to which they are digested in the GI tract, and in the process of fermentation. The solubility and fermentation of a particular fiber affects how it is handled in the GI tract. However, the effect of identical fibers varies from person to person.\par \par A gradual increase in dietary fiber can modify and improve symptoms. But individual responses vary and too much of a type of fiber can worsen symptoms. It may be necessary to try different types of fiber. With any dietary fiber it is best to start low and go slow.\par \par 3 pancreatic atrophy I will take trypsinogen level again She doesn’t have diarrhea or malabsorption .  \par 4 Fatty Liver: The patient denies any jaundice or pruritus. The patient denies any alcohol use. The patient denies taking large doses of nonsteroidal anti-inflammatory drugs or acetaminophen. The findings are suggestive of fatty liver. The patient and I had a long discussion regarding the risks of fatty liver progressing to cirrhosis. The patient was told of the possible increased risk of developing liver failure, cirrhosis, ascites, GI bleeding secondary to varices, hepatic encephalopathy, bleeding tendencies and liver cancer. The patient was told of the importance of follow-up. The patient was advised to follow up every 6 months for blood work and imaging studies. The patient agreed and will follow up. The patient was advised to lose weight. I recommend a trial of vitamin E supplementation for the fatty liver. If the liver enzymes remain elevated, the patient may require a trial of Pioglitazone for the fatty liver. I recommend avoid alcohol and hepato-toxic agents. The patient was also advised to avoid NSAIDs, Acetaminophen and any other hepatotoxic drugs. The patient was also advised not to share needles, razors, scissors, nail clippers, etc.. The patient is to continue close follow-up in our office for blood work and exams. If the liver enzymes remain elevated, the patient may require a CT guided liver biopsy to assess the liver parenchyma and for possible treatment. We had a long discussion regarding the risks and benefits of the procedure. The patient was told of the risks of bleeding, perforation, infections, emergency surgery and missing lesions. The patient agreed and will follow-up to reassess the symptoms Patient has been counseled on life style interventions, including but not limited to: weight loss (3-5% loss of body weight might improve fat in the liver, while 7-10% needed for potential improvement of other components, including inflammation and scarring of the liver, called fibrosis); healthy diet, avoiding added sugars, sodas, avoiding saturated fats, limiting sodium, avoiding alcohol; and on the importance of regular exercise (> 150 min/week moderate intensity aerobic exercise with at least 2x/week muscle strengthening or exercise as tolerated). \par - I explained to patient the natural Hx of NAFLD. \par  she will have repeat us of abd in June. 5.  GERd resolved discussed Rule of 2's; pt should avoid eating too much; too fast; too spicy; too lousy; less than two hours before bed \par -Things to avoid including overeating, spicy foods, tight clothing, eating within three hours of bed, this list is not all inclusive. \par -For treatment of reflux, possible options discussed including diet control, H2 blockers, PPIs, as well as coating motility agents discussed as treatment options. Timing of meals and proximity of last meal to sleep were discussed. If symptoms persist, a formal gastrointestinal evaluation is needed. \par \par

## 2021-03-09 NOTE — REASON FOR VISIT
[Follow-Up: _____] : a [unfilled] follow-up visit [[employer] ] : provided by [employer]   [FreeTextEntry1] : [id number] [FreeTextEntry2] : Herberto  [TWNoteComboBox1] : [place]

## 2021-03-09 NOTE — PHYSICAL EXAM
[General Appearance - Alert] : alert [General Appearance - In No Acute Distress] : in no acute distress [PERRL With Normal Accommodation] : pupils were equal in size, round, and reactive to light [Oropharynx] : the oropharynx was normal [Jugular Venous Distention Increased] : there was no jugular-venous distention [Auscultation Breath Sounds / Voice Sounds] : lungs were clear to auscultation bilaterally [Apical Impulse] : the apical impulse was normal [Heart Rate And Rhythm] : heart rate was normal and rhythm regular [Heart Sounds] : normal S1 and S2 [Heart Sounds Gallop] : no gallops [Edema] : there was no peripheral edema [Normal] : normal [Soft, Nontender] : the abdomen was soft and nontender [No Mass] : no masses were palpated [No HSM] : no hepatosplenomegaly noted [None] : no CVA tenderness [Cervical Lymph Nodes Enlarged Posterior Bilaterally] : posterior cervical [Cervical Lymph Nodes Enlarged Anterior Bilaterally] : anterior cervical [Supraclavicular Lymph Nodes Enlarged Bilaterally] : supraclavicular [Axillary Lymph Nodes Enlarged Bilaterally] : axillary [Femoral Lymph Nodes Enlarged Bilaterally] : femoral [Inguinal Lymph Nodes Enlarged Bilaterally] : inguinal [No CVA Tenderness] : no ~M costovertebral angle tenderness [Abnormal Walk] : normal gait [Nail Clubbing] : no clubbing  or cyanosis of the fingernails [Musculoskeletal - Swelling] : no joint swelling seen [Motor Tone] : muscle strength and tone were normal [Skin Color & Pigmentation] : normal skin color and pigmentation [Skin Turgor] : normal skin turgor [] : no rash [Deep Tendon Reflexes (DTR)] : deep tendon reflexes were 2+ and symmetric [Sensation] : the sensory exam was normal to light touch and pinprick [No Focal Deficits] : no focal deficits [Oriented To Time, Place, And Person] : oriented to person, place, and time [Impaired Insight] : insight and judgment were intact [Affect] : the affect was normal

## 2021-03-10 ENCOUNTER — LABORATORY RESULT (OUTPATIENT)
Age: 81
End: 2021-03-10

## 2021-03-11 ENCOUNTER — NON-APPOINTMENT (OUTPATIENT)
Age: 81
End: 2021-03-11

## 2021-03-11 LAB
ALBUMIN SERPL ELPH-MCNC: 4.6 G/DL
ALP BLD-CCNC: 86 U/L
ALT SERPL-CCNC: 11 U/L
ANION GAP SERPL CALC-SCNC: 14 MMOL/L
AST SERPL-CCNC: 15 U/L
BILIRUB DIRECT SERPL-MCNC: 0.1 MG/DL
BILIRUB INDIRECT SERPL-MCNC: 0.3 MG/DL
BILIRUB SERPL-MCNC: 0.4 MG/DL
BUN SERPL-MCNC: 22 MG/DL
CALCIUM SERPL-MCNC: 9.9 MG/DL
CHLORIDE SERPL-SCNC: 102 MMOL/L
CHOLEST SERPL-MCNC: 129 MG/DL
CO2 SERPL-SCNC: 26 MMOL/L
CREAT SERPL-MCNC: 0.73 MG/DL
CREAT SPEC-SCNC: 45 MG/DL
ESTIMATED AVERAGE GLUCOSE: 160 MG/DL
FRUCTOSAMINE SERPL-MCNC: 283 UMOL/L
GLUCOSE BS SERPL-MCNC: 127 MG/DL
GLUCOSE SERPL-MCNC: 118 MG/DL
HBA1C MFR BLD HPLC: 7.2 %
HDLC SERPL-MCNC: 51 MG/DL
LDLC SERPL CALC-MCNC: 54 MG/DL
MICROALBUMIN 24H UR DL<=1MG/L-MCNC: <1.2 MG/DL
MICROALBUMIN/CREAT 24H UR-RTO: NORMAL MG/G
NONHDLC SERPL-MCNC: 79 MG/DL
POTASSIUM SERPL-SCNC: 4.8 MMOL/L
PROT SERPL-MCNC: 7.1 G/DL
SODIUM SERPL-SCNC: 142 MMOL/L
T4 FREE SERPL-MCNC: 1.4 NG/DL
TRIGL SERPL-MCNC: 121 MG/DL
TSH SERPL-ACNC: 2.02 UIU/ML

## 2021-03-14 LAB
25(OH)D3 SERPL-MCNC: 32.2 NG/ML
A1AT SERPL-MCNC: 122 MG/DL
BARLEY IGE QN: <0.1 KUA/L
CHERRY IGE QN: <0.1 KUA/L
COW MILK IGE QN: <0.1 KUA/L
CRAB IGE QN: <0.1 KUA/L
DEPRECATED BARLEY IGE RAST QL: 0
DEPRECATED CHERRY IGE RAST QL: 0
DEPRECATED COW MILK IGE RAST QL: 0
DEPRECATED CRAB IGE RAST QL: 0
DEPRECATED EGG WHITE IGE RAST QL: 0
DEPRECATED OAT IGE RAST QL: 0
DEPRECATED PEANUT IGE RAST QL: 0
DEPRECATED RYE IGE RAST QL: 0
DEPRECATED SOYBEAN IGE RAST QL: 0
DEPRECATED WHEAT IGE RAST QL: 0
EGG WHITE IGE QN: <0.1 KUA/L
GLUCOSE 1H P LAC PO SERPL-MCNC: 131 MG/DL
GLUCOSE 30M P LAC PO SERPL-MCNC: 127 MG/DL
IGA SER QL IEP: 310 MG/DL
LACTOSE 1.5H P LAC PO SERPL-SCNC: 128 MG/DL
LACTOSE 2H P 50 G LAC PO SERPL-MCNC: 126 MG/DL
LACTOSE 3H P LAC PO SERPL-MCNC: 119 MG/DL
LACTOSE PRE FAST SERPL-MCNC: 127 MG/DL
MAGNESIUM SERPL-MCNC: 1.6 MG/DL
OAT IGE QN: <0.1 KUA/L
PEANUT IGE QN: <0.1 KUA/L
RYE IGE QN: <0.1 KUA/L
SOYBEAN IGE QN: <0.1 KUA/L
TOTAL IGE SMQN RAST: 61 KU/L
TTG IGA SER IA-ACNC: <1.2 U/ML
TTG IGA SER-ACNC: NEGATIVE
TTG IGG SER IA-ACNC: 8.4 U/ML
TTG IGG SER IA-ACNC: ABNORMAL
WHEAT IGE QN: <0.1 KUA/L

## 2021-03-16 LAB — TRYPSINOGEN SERPL-MCNC: 419 NG/ML

## 2021-03-23 ENCOUNTER — LABORATORY RESULT (OUTPATIENT)
Age: 81
End: 2021-03-23

## 2021-03-23 ENCOUNTER — APPOINTMENT (OUTPATIENT)
Dept: RHEUMATOLOGY | Facility: CLINIC | Age: 81
End: 2021-03-23
Payer: MEDICAID

## 2021-03-23 VITALS
HEART RATE: 75 BPM | OXYGEN SATURATION: 99 % | RESPIRATION RATE: 16 BRPM | HEIGHT: 57.5 IN | DIASTOLIC BLOOD PRESSURE: 89 MMHG | TEMPERATURE: 98.3 F | WEIGHT: 144 LBS | BODY MASS INDEX: 30.64 KG/M2 | SYSTOLIC BLOOD PRESSURE: 143 MMHG

## 2021-03-23 DIAGNOSIS — M79.10 MYALGIA, UNSPECIFIED SITE: ICD-10-CM

## 2021-03-23 DIAGNOSIS — M85.80 OTHER SPECIFIED DISORDERS OF BONE DENSITY AND STRUCTURE, UNSPECIFIED SITE: ICD-10-CM

## 2021-03-23 DIAGNOSIS — M76.30 ILIOTIBIAL BAND SYNDROME, UNSPECIFIED LEG: ICD-10-CM

## 2021-03-23 PROCEDURE — 99213 OFFICE O/P EST LOW 20 MIN: CPT

## 2021-03-23 PROCEDURE — 99072 ADDL SUPL MATRL&STAF TM PHE: CPT

## 2021-03-23 NOTE — HISTORY OF PRESENT ILLNESS
[FreeTextEntry1] : Patient reports pain in the shoulders are quiescent. She finds minimal joint swelling or paresthesias though finds LE muscle tightness to be bothersome. She denies difficulty in arising from a seated position. She denies weakness or tripping over feet. Patient further denies photosensitivity, rash or visual disturbances . She otherwise denies oral ulcers, shortness of breath, chest pain, motor/sensory disturbances, rash Raynauds or fever.\par \par \par

## 2021-03-23 NOTE — CONSULT LETTER
[Dear  ___] : Dear  [unfilled], [Courtesy Letter:] : I had the pleasure of seeing your patient, [unfilled], in my office today. [Please see my note below.] : Please see my note below. [Consult Closing:] : Thank you very much for allowing me to participate in the care of this patient.  If you have any questions, please do not hesitate to contact me. [Sincerely,] : Sincerely, [FreeTextEntry2] : Yenny Morales MD \par  [FreeTextEntry3] : Susanna Bui M.D.\par  of Medicine \par [facility] at [facility]/[place]\par \par

## 2021-03-23 NOTE — PHYSICAL EXAM
[General Appearance - Alert] : alert [General Appearance - In No Acute Distress] : in no acute distress [Sclera] : the sclera and conjunctiva were normal [Examination Of The Oral Cavity] : the lips and gums were normal [Oropharynx] : the oropharynx was normal [Heart Rate And Rhythm] : heart rate was normal and rhythm regular [Edema] : there was no peripheral edema [No Spinal Tenderness] : no spinal tenderness [Abnormal Walk] : normal gait [Musculoskeletal - Swelling] : no joint swelling seen [Motor Tone] : muscle strength and tone were normal [] : no rash [FreeTextEntry1] : Varicose veins over the LE [Motor Exam] : the motor exam was normal [Oriented To Time, Place, And Person] : oriented to person, place, and time [Impaired Insight] : insight and judgment were intact

## 2021-03-23 NOTE — ASSESSMENT
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   Emphasized importance of health screening in the setting of ds DNA Abs in this age group. She understands the importance of yearly Opthalmology screens for visual screen testing to assess for retinal toxicity.  She understands the importance of sun avoidance and the application of SPF protection as well as the use of wide brim hats.  Recent UA with hematuria/proteuria; Nephrology f/u in two weeks.   \par PT continued for b/l shoulders.  She currently does not have other systemic symptoms.  Topical anti-inflammatory given for pain relief. PT, ROM and stretching exercises for tendinitis demonstrated.  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 \par She will continue on Calcium and VitD at the recommended doses of 1200mg and 800IU daily, respectively.  We reviewed calcium and VitD enriched foods as well.\par \par \par She is in agreement with the above plan and will return in three month' time.\par  \par

## 2021-03-23 NOTE — REASON FOR VISIT
[[employer] ] : provided by [employer]   [Follow-Up: _____] : a [unfilled] follow-up visit [FreeTextEntry1] : [id number] [FreeTextEntry2] : Catherine [TWNoteComboBox1] : [place]

## 2021-03-24 LAB
25(OH)D3 SERPL-MCNC: 34.3 NG/ML
ALBUMIN SERPL ELPH-MCNC: 4.4 G/DL
ALP BLD-CCNC: 89 U/L
ALT SERPL-CCNC: 13 U/L
ANION GAP SERPL CALC-SCNC: 11 MMOL/L
APPEARANCE: CLEAR
AST SERPL-CCNC: 13 U/L
BASOPHILS # BLD AUTO: 0.04 K/UL
BASOPHILS NFR BLD AUTO: 0.9 %
BILIRUB SERPL-MCNC: 0.4 MG/DL
BILIRUBIN URINE: NEGATIVE
BLOOD URINE: NEGATIVE
BUN SERPL-MCNC: 24 MG/DL
CALCIUM SERPL-MCNC: 10 MG/DL
CHLORIDE SERPL-SCNC: 104 MMOL/L
CK SERPL-CCNC: 76 U/L
CO2 SERPL-SCNC: 27 MMOL/L
COLOR: COLORLESS
CREAT SERPL-MCNC: 0.66 MG/DL
EOSINOPHIL # BLD AUTO: 0.24 K/UL
EOSINOPHIL NFR BLD AUTO: 5.3 %
ERYTHROCYTE [SEDIMENTATION RATE] IN BLOOD BY WESTERGREN METHOD: 13 MM/HR
ESTIMATED AVERAGE GLUCOSE: 160 MG/DL
GLUCOSE QUALITATIVE U: NEGATIVE
GLUCOSE SERPL-MCNC: 133 MG/DL
HBA1C MFR BLD HPLC: 7.2 %
HCT VFR BLD CALC: 38.6 %
HGB BLD-MCNC: 12.2 G/DL
IMM GRANULOCYTES NFR BLD AUTO: 0.2 %
KETONES URINE: NEGATIVE
LEUKOCYTE ESTERASE URINE: NEGATIVE
LYMPHOCYTES # BLD AUTO: 1.21 K/UL
LYMPHOCYTES NFR BLD AUTO: 26.8 %
MAN DIFF?: NORMAL
MCHC RBC-ENTMCNC: 28 PG
MCHC RBC-ENTMCNC: 31.6 GM/DL
MCV RBC AUTO: 88.7 FL
MONOCYTES # BLD AUTO: 0.38 K/UL
MONOCYTES NFR BLD AUTO: 8.4 %
NEUTROPHILS # BLD AUTO: 2.64 K/UL
NEUTROPHILS NFR BLD AUTO: 58.4 %
NITRITE URINE: NEGATIVE
PH URINE: 6
PLATELET # BLD AUTO: 213 K/UL
POTASSIUM SERPL-SCNC: 4.5 MMOL/L
PROT SERPL-MCNC: 6.8 G/DL
PROTEIN URINE: NEGATIVE
RBC # BLD: 4.35 M/UL
RBC # FLD: 13 %
SODIUM SERPL-SCNC: 142 MMOL/L
SPECIFIC GRAVITY URINE: 1.01
TSH SERPL-ACNC: 2.53 UIU/ML
UROBILINOGEN URINE: NORMAL
WBC # FLD AUTO: 4.52 K/UL

## 2021-03-26 LAB
ANA PAT FLD IF-IMP: ABNORMAL
ANACR T: ABNORMAL

## 2021-05-25 ENCOUNTER — APPOINTMENT (OUTPATIENT)
Dept: RHEUMATOLOGY | Facility: CLINIC | Age: 81
End: 2021-05-25
Payer: MEDICAID

## 2021-05-25 VITALS
WEIGHT: 144 LBS | OXYGEN SATURATION: 95 % | BODY MASS INDEX: 30.64 KG/M2 | DIASTOLIC BLOOD PRESSURE: 77 MMHG | TEMPERATURE: 98 F | HEART RATE: 78 BPM | SYSTOLIC BLOOD PRESSURE: 127 MMHG | HEIGHT: 57.5 IN | RESPIRATION RATE: 16 BRPM

## 2021-05-25 DIAGNOSIS — M25.562 PAIN IN RIGHT KNEE: ICD-10-CM

## 2021-05-25 DIAGNOSIS — M25.561 PAIN IN RIGHT KNEE: ICD-10-CM

## 2021-05-25 DIAGNOSIS — R20.2 PARESTHESIA OF SKIN: ICD-10-CM

## 2021-05-25 PROCEDURE — 99214 OFFICE O/P EST MOD 30 MIN: CPT | Mod: 25

## 2021-05-25 PROCEDURE — 20610 DRAIN/INJ JOINT/BURSA W/O US: CPT | Mod: LT

## 2021-05-25 RX ORDER — METHYLPRED ACET/NACL,ISO-OS/PF 40 MG/ML
40 VIAL (ML) INJECTION
Qty: 1 | Refills: 0 | Status: COMPLETED | OUTPATIENT
Start: 2021-05-25

## 2021-05-25 RX ORDER — LIDOCAINE HYDROCHLORIDE 10 MG/ML
1 INJECTION, SOLUTION INFILTRATION; PERINEURAL
Qty: 0 | Refills: 0 | Status: COMPLETED | OUTPATIENT
Start: 2021-05-25

## 2021-05-25 RX ADMIN — METHYLPREDNISOLONE ACETATE 0 MG/ML: 40 INJECTION, SUSPENSION INTRA-ARTICULAR; INTRALESIONAL; INTRAMUSCULAR; SOFT TISSUE at 00:00

## 2021-05-25 RX ADMIN — LIDOCAINE HYDROCHLORIDE 0 %: 10 INJECTION, SOLUTION INFILTRATION; PERINEURAL at 00:00

## 2021-05-27 NOTE — HISTORY OF PRESENT ILLNESS
[FreeTextEntry1] : Patient reports LT shoulder pain over the last few weeks.  She explains difficulty in raising arm over head or reaching for the back. She denies accompanied paresthesias. She notes pain over the LE with accompanied knee pain without swelling.  She explains intermittent laxity symptoms.\par Renal consult appreciated; Losartan continued for mild proteinuria; L renal mass of 0.7 cm detected via CT abd    \par w/o hematuria or creatinine elevation.  Patient otherwise denies photosensitivity, rash or visual disturbances . She further denies oral ulcers, shortness of breath, chest pain, motor/sensory disturbances, rash , raynauds or fever.\par \par \par

## 2021-05-27 NOTE — ASSESSMENT
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   Emphasized importance of health screening in the setting of ds DNA Abs in this age group. Renal mass detected on CT abd followed by Renal  She understands the importance of yearly Opthalmology screens for visual screen testing to assess for retinal toxicity.  She understands the importance of sun avoidance and the application of SPF protection as well as the use of wide brim hats.  Recent UA with hematuria/proteuria; Nephrology continues to follow;  Losartan continued.\par \par PT continued for b/l shoulders.  She currently does not have other systemic symptoms.  Topical anti-inflammatory given for pain relief. PT, ROM and stretching exercises for tendinitis demonstrated.  Cortisone injection given for LT SAB.  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 \par She will continue on Calcium and VitD at the recommended doses of 1200mg and 800IU daily, respectively.  We reviewed calcium and VitD enriched foods as well.\par \par \par She is in agreement with the above plan and will return in three month' time.\par  \par

## 2021-05-27 NOTE — PHYSICAL EXAM
[General Appearance - Alert] : alert [General Appearance - In No Acute Distress] : in no acute distress [Sclera] : the sclera and conjunctiva were normal [Oropharynx] : the oropharynx was normal [Examination Of The Oral Cavity] : the lips and gums were normal [Heart Rate And Rhythm] : heart rate was normal and rhythm regular [Edema] : there was no peripheral edema [No Spinal Tenderness] : no spinal tenderness [Abnormal Walk] : normal gait [Musculoskeletal - Swelling] : no joint swelling seen [Motor Tone] : muscle strength and tone were normal [] : no rash [Motor Exam] : the motor exam was normal [Oriented To Time, Place, And Person] : oriented to person, place, and time [Impaired Insight] : insight and judgment were intact [FreeTextEntry1] : Varicose veins over the LE

## 2021-05-27 NOTE — REASON FOR VISIT
[[employer] ] : provided by [employer]   [Follow-Up: _____] : a [unfilled] follow-up visit [FreeTextEntry1] : [id number] [FreeTextEntry2] : Jer

## 2021-05-27 NOTE — CONSULT LETTER
[Dear  ___] : Dear  [unfilled], [Courtesy Letter:] : I had the pleasure of seeing your patient, [unfilled], in my office today. [Please see my note below.] : Please see my note below. [Consult Closing:] : Thank you very much for allowing me to participate in the care of this patient.  If you have any questions, please do not hesitate to contact me. [Sincerely,] : Sincerely, [FreeTextEntry2] : Yenny Morales MD \par  [FreeTextEntry3] : Susanna Bui M.D.\par  of Medicine \par [facility] at [facility]/[place]\par \par

## 2021-05-27 NOTE — PROCEDURE
[Other Date:___] : Date: [unfilled] [Patient] : the patient [Risks] : risks [Benefits] : benefits [Consent Obtained] : written consent was obtained prior to the procedure and is detailed in the patient's record [Therapeutic] : therapeutic [#1 Site: ______] : #1 site identified in the [unfilled] [Ethyl Chloride] : ethyl chloride [___ml 1% Lidocaine] : [unfilled] ml of 1% lidocaine [Depomedrol ___ mg] : Depomedrol [unfilled] mg [Tolerated Well] : the patient tolerated the procedure well [No Complications] : there were no complications [Patient Instructed to Call] : patient was instructed to call if redness at site, a decrease in range of motion or an increase in pain is noted after procedure.

## 2021-06-17 ENCOUNTER — RX RENEWAL (OUTPATIENT)
Age: 81
End: 2021-06-17

## 2021-06-26 ENCOUNTER — LABORATORY RESULT (OUTPATIENT)
Age: 81
End: 2021-06-26

## 2021-07-01 ENCOUNTER — APPOINTMENT (OUTPATIENT)
Dept: RHEUMATOLOGY | Facility: CLINIC | Age: 81
End: 2021-07-01
Payer: MEDICAID

## 2021-07-01 VITALS
WEIGHT: 142 LBS | RESPIRATION RATE: 16 BRPM | DIASTOLIC BLOOD PRESSURE: 68 MMHG | OXYGEN SATURATION: 97 % | TEMPERATURE: 97.4 F | BODY MASS INDEX: 30.63 KG/M2 | HEART RATE: 81 BPM | SYSTOLIC BLOOD PRESSURE: 119 MMHG | HEIGHT: 57 IN

## 2021-07-01 DIAGNOSIS — R76.8 OTHER SPECIFIED ABNORMAL IMMUNOLOGICAL FINDINGS IN SERUM: ICD-10-CM

## 2021-07-01 DIAGNOSIS — M18.9 OSTEOARTHRITIS OF FIRST CARPOMETACARPAL JOINT, UNSPECIFIED: ICD-10-CM

## 2021-07-01 PROCEDURE — 20600 DRAIN/INJ JOINT/BURSA W/O US: CPT | Mod: RT

## 2021-07-01 PROCEDURE — 99213 OFFICE O/P EST LOW 20 MIN: CPT | Mod: 25

## 2021-07-01 NOTE — PROCEDURE
[Today's Date:] : Date: [unfilled] [Patient] : the patient [Risks] : risks [Benefits] : benefits [Consent Obtained] : written consent was obtained prior to the procedure and is detailed in the patient's record [Therapeutic] : therapeutic [#1 Site: ______] : #1 site identified in the [unfilled] [Betadine] : betadine solution [25 gauge 1 inch] : A 25 gauge 1 inch needle was used [Depomedrol ___ mg] : Depomedrol [unfilled] mg [Tolerated Well] : the patient tolerated the procedure well [No Complications] : there were no complications [Patient Instructed to Call] : patient was instructed to call if redness at site, a decrease in range of motion or an increase in pain is noted after procedure.

## 2021-07-02 ENCOUNTER — APPOINTMENT (OUTPATIENT)
Dept: ENDOCRINOLOGY | Facility: CLINIC | Age: 81
End: 2021-07-02
Payer: MEDICAID

## 2021-07-02 VITALS
RESPIRATION RATE: 16 BRPM | HEART RATE: 81 BPM | BODY MASS INDEX: 30.85 KG/M2 | WEIGHT: 143 LBS | HEIGHT: 57 IN | DIASTOLIC BLOOD PRESSURE: 79 MMHG | OXYGEN SATURATION: 98 % | SYSTOLIC BLOOD PRESSURE: 128 MMHG | TEMPERATURE: 96.4 F

## 2021-07-02 LAB — GLUCOSE BLDC GLUCOMTR-MCNC: 170

## 2021-07-02 PROCEDURE — 99214 OFFICE O/P EST MOD 30 MIN: CPT | Mod: 25

## 2021-07-02 PROCEDURE — 82962 GLUCOSE BLOOD TEST: CPT

## 2021-07-02 NOTE — HISTORY OF PRESENT ILLNESS
[FreeTextEntry1] : Patient feels well , she is asymptomatic. Her weight has not changed. She is exercising regularly and following the diet. Her blood glucose at home are well controlled, they are usually around 120 mg/dl. The FBS in the laboratory was 119 mg/dl and the HbA1c was 7.1 %, improved since last visit. The renal function is within normal limits, the microalbumin in the urine was normal. The lipid panel was within normal limits. She denies low blood glucose during the night. She denies chest pain, or SOB. Denies numbness, tingling or burning sensation on her extremities. Taking her medications regularly. She has seen the Ophthalmologist recently. She has seen Podiatrist recently. She has not seen the Cardiologist recently.\par

## 2021-07-02 NOTE — DATA REVIEWED
[FreeTextEntry1] : The FBS and hbA1c are slightly improved. The renal function is elevated. The lipid panel was fine.

## 2021-07-02 NOTE — ASSESSMENT
[FreeTextEntry1] : Patient's diabetes is better controlled\par She is taking the medications regularly\par Her weight is stable\par Advised to follow a low CHO, low fat diet and exercise regularly\par She was reminded of the importance of keeping the HbA1c <7% to prevent or delay the diabetic complications\par The hyperlipidemia is well controlled\par Will continue the same medications\par The blood tests will be repeated before next visit\par \par \par \par

## 2021-07-04 NOTE — REASON FOR VISIT
[Follow-Up: _____] : a [unfilled] follow-up visit [[employer] ] : provided by [employer]   [Time Spent: ____ minutes] : I have spent [unfilled] minutes of time on the encounter. The patient's primary language is not English thus required  services. [FreeTextEntry8] : [id number] [FreeTextEntry2] : Loretta

## 2021-07-04 NOTE — HISTORY OF PRESENT ILLNESS
[FreeTextEntry1] : Patient returns for followup and explains improvement in left shoulder pain after injection in May. She reports rotator cuff strengthening exercises being performed on a daily basis. During the last encounter she c/o pain with recent knee films reflective of mild tricompartmental osteoarthritis. Patient remains active despite the intermittent pain over the knees without accompanied swelling. 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 Patient continues on calcium and vitamin D twice daily. She otherwise denies recent falls, fractures or dentition loss. She further denies systemic symptoms she continues to tolerate hydroxychloroquine.

## 2021-07-04 NOTE — ASSESSMENT
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   Emphasized importance of health screening in the setting of ds DNA Abs in this age group. Renal mass detected on CT abd followed by Renal \par \par She understands the importance of yearly Opthalmology screens for visual screen testing to assess for retinal toxicity.  She understands the importance of sun avoidance and the application of SPF protection as well as the use of wide brim hats.  Recent UA with hematuria/proteuria; Nephrology continues to follow;  Losartan continued.\par \par PT continued for b/l shoulders.  She currently does not have other systemic symptoms.  Topical anti-inflammatory given for pain relief. PT, ROM and stretching exercises for tendinitis demonstrated.  Cortisone injection given RT basilar OA.  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  OT referral given.\par \par Patient will continue with lifestyle and exercise modification to address fatty liver and cardiovascular health. \par She will continue on Calcium and VitD at the recommended doses of 1200mg and 800IU daily, respectively.  We reviewed calcium and VitD enriched foods as well. Recommend  bisphosphonate therapy weekly.\par \par \par She is in agreement with the above plan and will return in three month' time.\par  \par

## 2021-07-04 NOTE — CONSULT LETTER
[Dear  ___] : Dear  [unfilled], [Courtesy Letter:] : I had the pleasure of seeing your patient, [unfilled], in my office today. [Please see my note below.] : Please see my note below. [Consult Closing:] : Thank you very much for allowing me to participate in the care of this patient.  If you have any questions, please do not hesitate to contact me. [Sincerely,] : Sincerely, [FreeTextEntry2] : Yenny Morales MD \par  [FreeTextEntry3] : Susanna Bui M.D.\par  of Medicine \par [facility] at [facility]/[place]\par \par

## 2021-09-09 ENCOUNTER — APPOINTMENT (OUTPATIENT)
Dept: INTERNAL MEDICINE | Facility: CLINIC | Age: 81
End: 2021-09-09
Payer: MEDICAID

## 2021-09-09 VITALS
TEMPERATURE: 97.3 F | WEIGHT: 142 LBS | OXYGEN SATURATION: 98 % | BODY MASS INDEX: 30.63 KG/M2 | HEART RATE: 91 BPM | SYSTOLIC BLOOD PRESSURE: 144 MMHG | DIASTOLIC BLOOD PRESSURE: 85 MMHG | HEIGHT: 57 IN | RESPIRATION RATE: 12 BRPM

## 2021-09-09 PROCEDURE — 99214 OFFICE O/P EST MOD 30 MIN: CPT | Mod: 25

## 2021-09-09 PROCEDURE — T1013A: CUSTOM

## 2021-09-09 RX ORDER — FAMOTIDINE 20 MG/1
20 TABLET, FILM COATED ORAL
Refills: 0 | Status: COMPLETED | COMMUNITY
End: 2021-09-09

## 2021-09-09 NOTE — REASON FOR VISIT
[Follow-Up: _____] : a [unfilled] follow-up visit [[employer] ] : provided by [employer]   [Time Spent: ____ minutes] : Total time spent using  services: [unfilled] minutes. The patient's primary language is not English thus required  services. [FreeTextEntry1] : [id number] [FreeTextEntry2] : Alba [TWNoteComboBox1] : [place]

## 2021-09-09 NOTE — PHYSICAL EXAM
[General Appearance - Alert] : alert [General Appearance - In No Acute Distress] : in no acute distress [PERRL With Normal Accommodation] : pupils were equal in size, round, and reactive to light [Oropharynx] : the oropharynx was normal [Auscultation Breath Sounds / Voice Sounds] : lungs were clear to auscultation bilaterally [Apical Impulse] : the apical impulse was normal [Heart Rate And Rhythm] : heart rate was normal and rhythm regular [Heart Sounds] : normal S1 and S2 [Heart Sounds Gallop] : no gallops [Full Pulse] : the pedal pulses are present [Edema] : there was no peripheral edema [Normal] : normal [Soft, Nontender] : the abdomen was soft and nontender [No Mass] : no masses were palpated [No HSM] : no hepatosplenomegaly noted [Cervical Lymph Nodes Enlarged Posterior Bilaterally] : posterior cervical [Cervical Lymph Nodes Enlarged Anterior Bilaterally] : anterior cervical [No CVA Tenderness] : no ~M costovertebral angle tenderness [No Spinal Tenderness] : no spinal tenderness [Abnormal Walk] : normal gait [Nail Clubbing] : no clubbing  or cyanosis of the fingernails [Musculoskeletal - Swelling] : no joint swelling seen [Motor Tone] : muscle strength and tone were normal [Skin Turgor] : normal skin turgor [Skin Color & Pigmentation] : normal skin color and pigmentation [] : no rash [Deep Tendon Reflexes (DTR)] : deep tendon reflexes were 2+ and symmetric [Sensation] : the sensory exam was normal to light touch and pinprick [No Focal Deficits] : no focal deficits [Oriented To Time, Place, And Person] : oriented to person, place, and time [Impaired Insight] : insight and judgment were intact [Affect] : the affect was normal

## 2021-09-09 NOTE — END OF VISIT
[>50% of the face to face encounter time was spent on counseling and/or coordination of care for ___] : Greater than 50% of the face to face encounter time was spent on counseling and/or coordination of care for [unfilled] [FreeTextEntry3] : resdient present

## 2021-09-09 NOTE — ASSESSMENT
[FreeTextEntry1] : 1 lactose intolerance  Lactose intolerance is a condition that makes it hard for your body to digest milk and foods made with milk (called dairy products). If you have lactose intolerance and you eat dairy products, you can get diarrhea, belly pain, and gas.\par Lactose intolerance can affect anyone. But it is most common among , , and black people.\par In people who do not have lactose intolerance, the body makes a protein called an "enzyme" that breaks down lactose, the main form of sugar found in milk. In people who do have lactose intolerance, the body either does not make enough of the enzyme, or the enzyme does not work as well as it should. Also, some infections, such as you might get with food poisoning, can damage the enzyme. But if that happens, the problem usually goes away within a few weeks. Luckily, people with lactose intolerance can take an enzyme supplement to help with their problem.\par What are the symptoms of lactose intolerance?The symptoms happen only after you eat dairy foods. They can include:\par ?Cramps or belly pain (usually around or below the belly button)\par ?Bloating (feeling like your belly is full of air)\par ?Gas\par ?Diarrhea (often it is bulky, foamy, and watery)\par ?Vomiting (this happens mostly in teens)\par Is there a test for lactose intolerance?Yes, there are 2 ways to test for lactose intolerance. One is a breathing test, and one is a blood test. The breathing test is more common.\par Your doctor or nurse will tell you how to prepare for your test. You will not be able to eat or drink anything for several hours before the test. Plus, you might have to change your medicines or stop smoking for a while before the test.\par ?Lactose hydrogen breath test – For this test, you drink a liquid that has lactose in it. Then you breathe into a special machine every 30 minutes. The machine measures how much hydrogen you breathe out. People who have lactose intolerance breathe out more hydrogen than normal.\par ?Lactose tolerance test – For this test, you drink a liquid that has lactose in it. The doctor or nurse will take blood samples from you when the test starts, and again 1 and 2 hours later. If your blood has low levels of sugar after you drink the lactose, it means you probably have lactose intolerance.\par  If you think you might have lactose intolerance, tell your doctor or nurse. He or she can ask you questions to make sure that there are no other problems.\par How is lactose intolerance treated?Treatment differs depending on how severe the problem is. But in general, treatment can include:\par ?Eating less dairy food\par ?Finding non-dairy sources of nutrients (such as calcium and vitamin D) and protein\par ?Taking an enzyme supplement that will help you break down dairy foods\par How do I reduce the amount of dairy foods I eat?You can start by cutting down but not stopping foods you know contain dairy. Dairy foods should be consumed with meals. Dairy foods include milk, cream, ice cream, yogurt, cheese, and butter. This table shows how much lactose is in some common dairy foods .\par Your doctor or nurse might suggest that you talk to a nutritionist to learn which foods have lactose. The nutritionist can also make sure that you get enough calcium and vitamin D in your diet.\par If you are really sensitive to dairy foods or lactose, you will also need to read the labels on everything you eat. Milk or lactose is sometimes added to foods you might not suspect, such as cereal, instant soups, and salad dressings. Check the ingredient list of foods for anything that might suggest lactose. Look for these words:\par ?Milk, "milk byproducts," "dry milk powder," and "dry milk solids"\par ?Lactose\par ?Whey (whey is milk that has gone sour)\par Although some medicines are made with lactose, most people who are lactose intolerant can handle the very small amount in medicines.\par Which enzyme supplement should I use?There are many enzyme supplements to choose from, including Lactaid (tablets or liquid), Lactrase, LactAce, Dairy Ease, and Lactrol. You should take the supplement right before you start eating. If you forget, you can take it during the meal, but it might not work as well.\par The important thing to know is that each product works a bit differently for each person. Plus, none of them can break down every last bit of lactose, so some people still have symptoms even with an enzyme supplement.\par Should I take calcium or vitamin D supplements?That depends on whether you completely avoid dairy foods. If you do, your doctor or nurse might recommend calcium supplements. He or she might also check your vitamin D levels to decide whether you should take supplements.\par Is lactose intolerance basically a food allergy?No. There are people who are allergic to milk and dairy foods. But the symptoms of a dairy allergy are often different from those of lactose intolerance. In the case of an allergy, the body reacts to the protein in milk, rather than to the sugar. Plus, allergies involve the body's infection-fighting system, called the immune system. Lactose intolerance does not.\par \par 2 Fatty Liver: The patient denies any jaundice or pruritus. The patient denies any alcohol use. The patient denies taking large doses of nonsteroidal anti-inflammatory drugs or acetaminophen. The findings are suggestive of fatty liver. The patient and I had a long discussion regarding the risks of fatty liver progressing to cirrhosis. The patient was told of the possible increased risk of developing liver failure, cirrhosis, ascites, GI bleeding secondary to varices, hepatic encephalopathy, bleeding tendencies and liver cancer. The patient was told of the importance of follow-up. The patient was advised to follow up every 6 months for blood work and imaging studies. The patient agreed and will follow up. The patient was advised to lose weight. I recommend a trial of vitamin E supplementation for the fatty liver. If the liver enzymes remain elevated, the patient may require a trial of Pioglitazone for the fatty liver. I recommend avoid alcohol and hepato-toxic agents. The patient was also advised to avoid NSAIDs, Acetaminophen and any other hepatotoxic drugs. The patient was also advised not to share needles, razors, scissors, nail clippers, etc.. The patient is to continue close follow-up in our office for blood work and exams. If the liver enzymes remain elevated, the patient may require a CT guided liver biopsy to assess the liver parenchyma and for possible treatment. We had a long discussion regarding the risks and benefits of the procedure. The patient was told of the risks of bleeding, perforation, infections, emergency surgery and missing lesions. The patient agreed and will follow-up to reassess the symptoms Patient has been counseled on life style interventions, including but not limited to: weight loss (3-5% loss of body weight might improve fat in the liver, while 7-10% needed for potential improvement of other components, including inflammation and scarring of the liver, called fibrosis); healthy diet, avoiding added sugars, sodas, avoiding saturated fats, limiting sodium, avoiding alcohol; and on the importance of regular exercise (> 150 min/week moderate intensity aerobic exercise with at least 2x/week muscle strengthening or exercise as tolerated). \par - I explained to patient the natural Hx of NAFLD. \par \par 3  gluten-free diet is a diet that doesn't contain any gluten. Gluten is a protein that is found in wheat, rye, barley, and (sometimes) oats. Many foods, such as breads, pasta, pizza, cereals, and crackers, have gluten in them. People who are on a gluten-free diet should not eat any foods with gluten.\par \par \par Who should be on a gluten-free diet?\par People with a condition called celiac disease should be on a gluten-free diet. Celiac disease is a condition that affects the body's ability to break down certain foods. People with celiac disease get sick if they eat foods with gluten. They need to be on a strict gluten-free diet for their whole life.\par \par If you think you have celiac disease, don't start a gluten-free diet until after you are tested for the disease. That's because what you eat can affect your test results.\par \par More and more, though, people without celiac disease are eating a gluten-free diet. They might have heard that this diet can help them lose weight or feel better. It's true that a gluten-free diet can be healthy. But it also sometimes keeps people from getting all the nutrition they need. Also, some gluten-free products have a lot of sugar and calories. This can make it harder for some people to stay at a healthy weight. If you are thinking about being on a gluten-free diet, ask your doctor or nurse if it's a good choice for you.\par \par \par How do I get started?\par To get started, you will work with a dietitian (food expert) or other professional who has experience with a gluten-free diet. They will:\par \par ?Teach you which foods are fine to eat and which foods you should avoid\par \par ?Help you plan balanced meals so that you get the nutrition you need\par \par ?Give you gluten-free recipes\par \par ?Help you find gluten-free substitutes for your favorite foods (such as pasta or cookies)\par \par \par You can get advice and help from other people, too. Ask your doctor or nurse if there is a local support group for people with celiac disease. Or you can look at websites, such as www.celiac.org, www.nationalceliac.org, and www.gikids.org.\par \par It can be hard to learn how to manage a gluten-free diet, especially at first. But it usually gets easier with practice and over time.\par \par \par Which foods can I eat?\par Foods that are gluten free and fine to eat include the following (table 1):\par \par ?Rice, corn, potatoes, quinoa, millet, buckwheat, and soybeans\par \par ?Special flours, pasta, and other products made from these foods and labeled "gluten free"\par \par ?Fruits and vegetables\par \par ?Meat and eggs\par \par ?Wine and distilled alcoholic drinks, such as rum, tequila, vodka, and whiskey\par \par \par Milk, cheese, and other dairy foods are also gluten free. But many people with celiac disease have trouble digesting these foods, especially at first. Doctors usually recommend that people with celiac disease avoid eating dairy products, at least for a short time, while their intestines are healing.\par \par \par Which foods should I not eat?\par You need to avoid all foods made from or with wheat, rye, and barley (table 2). Ask your doctor or dietitian if you can eat oats.\par \par Many types of foods contain or might contain gluten, such as:\par \par ?Flour, breads, crackers, muffins, and baking mixes\par \par ?Pasta, pastries, and cereals\par \par ?Some sauces, spreads, spices, condiments, and salad dressings\par \par ?Processed meats and meat substitutes (like vegetarian burgers)\par \par ?Beers, ales, lagers, and malt vinegars\par \par \par To know exactly which foods you can eat, you will have to read ingredient labels. Foods that are labeled "gluten free" or say they are made or processed in a "gluten-free facility" are fine to eat. Foods that contain wheat are not fine to eat. If you are unsure whether a food is gluten free, call the company. Their phone number should be on the package.\par \par Some medicines (both prescription and over-the-counter) and vitamin supplements contain a small amount of gluten. But you can still take most types of pills if you have celiac disease. Check with your doctor or nurse if you are not sure.\par \par \par Will I need to take vitamins?\par You might. Celiac disease can keep your digestive system from normally absorbing the nutrients in foods. To get all the nutrients you need, your doctor or nurse might recommend that you take a daily vitamin.\par \par \par Can I eat out?\par Yes. Many restaurants now have gluten-free menus or foods. But always let the restaurant know you can't have gluten. That way, they can be extra careful when they cook your food.\par \par 4.  dyspepsia  discussed Rule of 2's; pt should avoid eating too much; too fast; too spicy; too lousy; less than two hours before bed \par -Things to avoid including overeating, spicy foods, tight clothing, eating within three hours of bed, this list is not all inclusive. \par -For treatment of reflux, possible options discussed including diet control, H2 blockers, PPIs, as well as coating motility agents discussed as treatment options. Timing of meals and proximity of last meal to sleep were discussed. If symptoms persist, a formal gastrointestinal evaluation is needed. \par \par  5. pancreatic atrophy no diarrhea or malabsorption trypsinogen normal amylase lipase normal no need for  repalced enzymes.

## 2021-09-09 NOTE — HISTORY OF PRESENT ILLNESS
[Heartburn] : denies heartburn [Nausea] : denies nausea [Vomiting] : denies vomiting [Diarrhea] : denies diarrhea [Yellow Skin Or Eyes (Jaundice)] : denies jaundice [Constipation] : denies constipation [Abdominal Pain] : denies abdominal pain [Abdominal Swelling] : denies abdominal swelling [Rectal Pain] : denies rectal pain [Wt Gain ___ Lbs] : no recent weight gain [Wt Loss ___ Lbs] : recent [unfilled] ~Upound(s) weight loss [GERD] : gastroesophageal reflux disease [Peptic Ulcer Disease] : no peptic ulcer disease [Hiatus Hernia] : no hiatus hernia [Pancreatitis] : pancreatitis [Cholelithiasis] : no cholelithiasis [Inflammatory Bowel Disease] : no inflammatory bowel disease [Irritable Bowel Syndrome] : no irritable bowel syndrome [Diverticulitis] : no diverticulitis [Alcohol Abuse] : no alcohol abuse [Malignancy] : no malignancy [Abdominal Surgery] : abdominal surgery [Appendectomy] : no appendectomy [Cholecystectomy] : no cholecystectomy [_________] : Performed [unfilled] [de-identified] : Pt has positive  celiac  hla Dq 8 she has celiac ab and should follow a gluten free diet  and could see a nutritionist so they can go over details of the diet.   I discussed diet and foods to avoid.  She also had lactose intolerance.  She is due for her us of abd for her fatty liver.   She doesn’t have abd pain , nausea or vomiting  and had diarrhea  once .  No discoloration of her stools or blood or mucus .   [de-identified] : main duct calcifications with severe atrophy of tail to neck of npancreas [de-identified] : liver slighlty heterogenous   stable benign angiomyolipoma

## 2021-09-12 LAB
AFP-TM SERPL-MCNC: 1.9 NG/ML
FERRITIN SERPL-MCNC: 17 NG/ML
FOLATE SERPL-MCNC: 9.3 NG/ML
IGA SER QL IEP: 296 MG/DL
IRON SATN MFR SERPL: 12 %
IRON SERPL-MCNC: 44 UG/DL
MAGNESIUM SERPL-MCNC: 1.4 MG/DL
TIBC SERPL-MCNC: 364 UG/DL
UIBC SERPL-MCNC: 320 UG/DL
VIT B12 SERPL-MCNC: 351 PG/ML

## 2021-09-13 LAB
TTG IGA SER IA-ACNC: <1.2 U/ML
TTG IGA SER-ACNC: NEGATIVE
TTG IGG SER IA-ACNC: 5.4 U/ML
TTG IGG SER IA-ACNC: NEGATIVE

## 2021-09-16 NOTE — HISTORY OF PRESENT ILLNESS
Was able to finally speak to Roxana's . He just needed to know if Roxana needed to do the bloodwork that Dr. Phillip ordered, but it looks like the hospital did it there so I told him to disregard the lab order.  Roxana is home from the hospital and home health will be visiting her on Saturday.   [de-identified] : CC right shoulder\par \par HPI 79 yo female right HD presents for follow up discussion for acute onset of 2 months of constant pain in the lateral right shoulder after lifting a heavy bag. The pain is same, and rated a 10 out of 10, described as pain, with radiation to the head. Rest makes the pain better and use of the shoulder makes the pain worse. The patient reports associated symptoms of weakness loss of motion. The patient reports severe pain at night affecting sleep, report neck pain, and + similar pain previously treated by therapy and corticosteroid injections by Dr. Susanna Bui.\par \par The patient has tried the following treatments:\par Activity modification	+\par Ice			+\par Nsaids    		+\par Physical Therapy  	+ minimal improvement\par Cortisone Injection	+ 2 shots minimal improvement\par Arthroscopy/Surgery	-\par \par wants to discuss surgery\par \par Review of Systems is positive for the above musculoskeletal symptoms and is otherwise non-contributory for general, constitutional, psychiatric, neurologic, HEENT, cardiac, respiratory, gastrointestinal, reproductive, lymphatic, and dermatologic complaints.\par \par Consult by Dr Susanna Bui

## 2021-09-30 ENCOUNTER — APPOINTMENT (OUTPATIENT)
Dept: RHEUMATOLOGY | Facility: CLINIC | Age: 81
End: 2021-09-30
Payer: MEDICAID

## 2021-09-30 VITALS
DIASTOLIC BLOOD PRESSURE: 79 MMHG | RESPIRATION RATE: 16 BRPM | TEMPERATURE: 98.1 F | BODY MASS INDEX: 30.2 KG/M2 | SYSTOLIC BLOOD PRESSURE: 125 MMHG | OXYGEN SATURATION: 95 % | WEIGHT: 140 LBS | HEIGHT: 57 IN | HEART RATE: 92 BPM

## 2021-09-30 PROCEDURE — 20610 DRAIN/INJ JOINT/BURSA W/O US: CPT | Mod: LT

## 2021-09-30 PROCEDURE — 99213 OFFICE O/P EST LOW 20 MIN: CPT | Mod: 25

## 2021-09-30 RX ORDER — METHYLPRED ACET/NACL,ISO-OS/PF 40 MG/ML
40 VIAL (ML) INJECTION
Qty: 1 | Refills: 0 | Status: COMPLETED | OUTPATIENT
Start: 2021-09-30

## 2021-09-30 RX ORDER — LIDOCAINE HYDROCHLORIDE 10 MG/ML
1 INJECTION, SOLUTION INFILTRATION; PERINEURAL
Qty: 0 | Refills: 0 | Status: COMPLETED | OUTPATIENT
Start: 2021-09-30

## 2021-09-30 RX ADMIN — METHYLPREDNISOLONE ACETATE 0 MG/ML: 40 INJECTION, SUSPENSION INTRA-ARTICULAR; INTRALESIONAL; INTRAMUSCULAR; SOFT TISSUE at 00:00

## 2021-09-30 RX ADMIN — LIDOCAINE HYDROCHLORIDE 0 %: 10 INJECTION, SOLUTION INFILTRATION; PERINEURAL at 00:00

## 2021-10-03 NOTE — PROCEDURE
[Other Date:___] : Date: [unfilled] [Patient] : the patient [Risks] : risks [Benefits] : benefits [Consent Obtained] : written consent was obtained prior to the procedure and is detailed in the patient's record [#1 Site: ______] : #1 site identified in the [unfilled] [Betadine] : betadine solution [25 gauge 1.5 inch] : A 25 gauge 1.5 inch needle was used [___ml 1% Lidocaine] : [unfilled] ml of 1% lidocaine [Depomedrol ___ mg] : Depomedrol [unfilled] mg [Tolerated Well] : the patient tolerated the procedure well [No Complications] : there were no complications [Patient Instructed to Call] : patient was instructed to call if redness at site, a decrease in range of motion or an increase in pain is noted after procedure.

## 2021-10-05 LAB
ANION GAP SERPL CALC-SCNC: 18 MMOL/L
BUN SERPL-MCNC: 22 MG/DL
CALCIUM SERPL-MCNC: 9.9 MG/DL
CHLORIDE SERPL-SCNC: 103 MMOL/L
CHOLEST SERPL-MCNC: 134 MG/DL
CO2 SERPL-SCNC: 22 MMOL/L
CREAT SERPL-MCNC: 0.71 MG/DL
CREAT SPEC-SCNC: 55 MG/DL
ESTIMATED AVERAGE GLUCOSE: 160 MG/DL
FRUCTOSAMINE SERPL-MCNC: 293 UMOL/L
GLUCOSE BS SERPL-MCNC: 141 MG/DL
GLUCOSE SERPL-MCNC: 147 MG/DL
HBA1C MFR BLD HPLC: 7.2 %
HDLC SERPL-MCNC: 58 MG/DL
LDLC SERPL CALC-MCNC: 57 MG/DL
MICROALBUMIN 24H UR DL<=1MG/L-MCNC: <1.2 MG/DL
MICROALBUMIN/CREAT 24H UR-RTO: NORMAL MG/G
NONHDLC SERPL-MCNC: 76 MG/DL
POTASSIUM SERPL-SCNC: 4.9 MMOL/L
SODIUM SERPL-SCNC: 143 MMOL/L
T4 FREE SERPL-MCNC: 1.4 NG/DL
TRIGL SERPL-MCNC: 93 MG/DL
TSH SERPL-ACNC: 1.96 UIU/ML

## 2021-10-06 ENCOUNTER — APPOINTMENT (OUTPATIENT)
Dept: ENDOCRINOLOGY | Facility: CLINIC | Age: 81
End: 2021-10-06
Payer: MEDICAID

## 2021-10-06 VITALS
TEMPERATURE: 98 F | OXYGEN SATURATION: 95 % | DIASTOLIC BLOOD PRESSURE: 79 MMHG | HEIGHT: 57 IN | SYSTOLIC BLOOD PRESSURE: 127 MMHG | HEART RATE: 82 BPM | RESPIRATION RATE: 16 BRPM | BODY MASS INDEX: 30.42 KG/M2 | WEIGHT: 141 LBS

## 2021-10-06 LAB — GLUCOSE BLDC GLUCOMTR-MCNC: 121

## 2021-10-06 PROCEDURE — 99214 OFFICE O/P EST MOD 30 MIN: CPT | Mod: 25

## 2021-10-06 PROCEDURE — 82962 GLUCOSE BLOOD TEST: CPT

## 2021-10-06 NOTE — DATA REVIEWED
[FreeTextEntry1] : The FBS and HbA1c remain slightly elevated. The renal function is fine. The lipid panel was aklso fine.

## 2021-10-06 NOTE — HISTORY OF PRESENT ILLNESS
[FreeTextEntry1] : Patient feels well , she is asymptomatic. Her weight has decreased. She is walking regularly and following the diet. Her blood glucose at home are well controlled, they are usually around 120 mg/dl. The FBS in the laboratory was 141 mg/dl and the HbA1c was 7.2 % The renal function is within normal limits, the microalbumin in the urine was normal. The lipid panel was within normal limits. She denies low blood glucose during the night. She denies chest pain, or SOB. Denies numbness, tingling or burning sensation on her extremities. Taking her medications regularly. She has seen the Ophthalmologist recently. She has seen Podiatrist recently. She has not seen the Cardiologist recently.\par

## 2021-10-06 NOTE — ASSESSMENT
[FreeTextEntry1] : Patient's diabetes is not well controlled\par Taking her medications regularly\par Advised to follow the diet and walk regularly\par Will add Tradjenta 5 mg po QD\par Informed about possible side effects

## 2022-01-10 ENCOUNTER — APPOINTMENT (OUTPATIENT)
Dept: INTERNAL MEDICINE | Facility: CLINIC | Age: 82
End: 2022-01-10
Payer: MEDICAID

## 2022-01-10 VITALS
RESPIRATION RATE: 17 BRPM | DIASTOLIC BLOOD PRESSURE: 76 MMHG | BODY MASS INDEX: 31.5 KG/M2 | SYSTOLIC BLOOD PRESSURE: 137 MMHG | TEMPERATURE: 98.2 F | WEIGHT: 146 LBS | HEART RATE: 93 BPM | HEIGHT: 57 IN | OXYGEN SATURATION: 98 %

## 2022-01-10 PROCEDURE — T1013A: CUSTOM

## 2022-01-10 PROCEDURE — 99214 OFFICE O/P EST MOD 30 MIN: CPT

## 2022-01-10 RX ORDER — LINAGLIPTIN 5 MG/1
5 TABLET, FILM COATED ORAL
Qty: 90 | Refills: 3 | Status: COMPLETED | COMMUNITY
Start: 2021-10-06 | End: 2022-01-10

## 2022-01-10 RX ORDER — CETIRIZINE HYDROCHLORIDE 10 MG/1
10 CAPSULE, LIQUID FILLED ORAL
Qty: 30 | Refills: 2 | Status: COMPLETED | COMMUNITY
Start: 2019-09-04 | End: 2022-01-10

## 2022-01-10 RX ORDER — CLOTRIMAZOLE 10 MG/ML
1 SOLUTION TOPICAL
Qty: 30 | Refills: 0 | Status: COMPLETED | COMMUNITY
Start: 2021-10-19 | End: 2022-01-10

## 2022-01-10 RX ORDER — AMMONIUM LACTATE 12 %
12 CREAM (GRAM) TOPICAL
Qty: 385 | Refills: 0 | Status: ACTIVE | COMMUNITY
Start: 2021-09-19

## 2022-01-10 RX ORDER — CALCIUM CARBONATE/VITAMIN D3 500MG-5MCG
500-200 TABLET ORAL
Qty: 60 | Refills: 0 | Status: ACTIVE | COMMUNITY
Start: 2021-06-08

## 2022-01-10 RX ORDER — CETIRIZINE HYDROCHLORIDE 10 MG/1
10 CAPSULE, LIQUID FILLED ORAL
Qty: 30 | Refills: 1 | Status: COMPLETED | COMMUNITY
Start: 2018-07-05 | End: 2022-01-10

## 2022-01-10 RX ORDER — LIFITEGRAST 50 MG/ML
5 SOLUTION/ DROPS OPHTHALMIC
Qty: 60 | Refills: 0 | Status: ACTIVE | COMMUNITY
Start: 2021-05-20

## 2022-01-10 RX ORDER — ERGOCALCIFEROL 1.25 MG/1
1.25 MG CAPSULE, LIQUID FILLED ORAL
Qty: 4 | Refills: 0 | Status: COMPLETED | COMMUNITY
Start: 2021-10-18

## 2022-01-10 RX ORDER — MULTIVITAMIN/IRON/FOLIC ACID 18MG-0.4MG
500-400 TABLET ORAL
Qty: 60 | Refills: 0 | Status: COMPLETED | COMMUNITY
Start: 2017-01-19 | End: 2022-01-10

## 2022-01-10 NOTE — PHYSICAL EXAM
[General Appearance - Alert] : alert [General Appearance - In No Acute Distress] : in no acute distress [PERRL With Normal Accommodation] : pupils were equal in size, round, and reactive to light [Oropharynx] : the oropharynx was normal [Jugular Venous Distention Increased] : there was no jugular-venous distention [Auscultation Breath Sounds / Voice Sounds] : lungs were clear to auscultation bilaterally [Apical Impulse] : the apical impulse was normal [Heart Rate And Rhythm] : heart rate was normal and rhythm regular [Heart Sounds] : normal S1 and S2 [Heart Sounds Gallop] : no gallops [Full Pulse] : the pedal pulses are present [Edema] : there was no peripheral edema [Normal] : normal [Soft, Nontender] : the abdomen was soft and nontender [No Mass] : no masses were palpated [No HSM] : no hepatosplenomegaly noted [None] : no CVA tenderness [Cervical Lymph Nodes Enlarged Posterior Bilaterally] : posterior cervical [Cervical Lymph Nodes Enlarged Anterior Bilaterally] : anterior cervical [Supraclavicular Lymph Nodes Enlarged Bilaterally] : supraclavicular [Axillary Lymph Nodes Enlarged Bilaterally] : axillary [Femoral Lymph Nodes Enlarged Bilaterally] : femoral [Inguinal Lymph Nodes Enlarged Bilaterally] : inguinal [No CVA Tenderness] : no ~M costovertebral angle tenderness [No Spinal Tenderness] : no spinal tenderness [Abnormal Walk] : normal gait [Nail Clubbing] : no clubbing  or cyanosis of the fingernails [Musculoskeletal - Swelling] : no joint swelling seen [Motor Tone] : muscle strength and tone were normal [Skin Color & Pigmentation] : normal skin color and pigmentation [Skin Turgor] : normal skin turgor [] : no rash [Deep Tendon Reflexes (DTR)] : deep tendon reflexes were 2+ and symmetric [Sensation] : the sensory exam was normal to light touch and pinprick [No Focal Deficits] : no focal deficits [Oriented To Time, Place, And Person] : oriented to person, place, and time [Affect] : the affect was normal [Impaired Insight] : insight and judgment were intact

## 2022-01-10 NOTE — ASSESSMENT
[FreeTextEntry1] : 1  gerd   discussed Rule of 2's; pt should avoid eating too much; too fast; too spicy; too lousy; less than two hours before bed \par -Things to avoid including overeating, spicy foods, tight clothing, eating within three hours of bed, this list is not all inclusive. \par -For treatment of reflux, possible options discussed including diet control, H2 blockers, PPIs, as well as coating motility agents discussed as treatment options. Timing of meals and proximity of last meal to sleep were discussed. If symptoms persist, a formal gastrointestinal evaluation is needed. \par \par she is doing well on famotidine \par 2  celiac   continue  gluten free diet    gluten-free diet is a diet that doesn't contain any gluten. Gluten is a protein that is found in wheat, rye, barley, and (sometimes) oats. Many foods, such as breads, pasta, pizza, cereals, and crackers, have gluten in them. People who are on a gluten-free diet should not eat any foods with gluten.\par \par \par Who should be on a gluten-free diet?\par People with a condition called celiac disease should be on a gluten-free diet. Celiac disease is a condition that affects the body's ability to break down certain foods. People with celiac disease get sick if they eat foods with gluten. They need to be on a strict gluten-free diet for their whole life.\par \par If you think you have celiac disease, don't start a gluten-free diet until after you are tested for the disease. That's because what you eat can affect your test results.\par \par More and more, though, people without celiac disease are eating a gluten-free diet. They might have heard that this diet can help them lose weight or feel better. It's true that a gluten-free diet can be healthy. But it also sometimes keeps people from getting all the nutrition they need. Also, some gluten-free products have a lot of sugar and calories. This can make it harder for some people to stay at a healthy weight. If you are thinking about being on a gluten-free diet, ask your doctor or nurse if it's a good choice for you.\par \par \par How do I get started?\par To get started, you will work with a dietitian (food expert) or other professional who has experience with a gluten-free diet. They will:\par \par ?Teach you which foods are fine to eat and which foods you should avoid\par \par ?Help you plan balanced meals so that you get the nutrition you need\par \par ?Give you gluten-free recipes\par \par ?Help you find gluten-free substitutes for your favorite foods (such as pasta or cookies)\par \par \par You can get advice and help from other people, too. Ask your doctor or nurse if there is a local support group for people with celiac disease. Or you can look at websites, such as www.celiac.org, www.[employer].org, and www.[employer].org.\par \par It can be hard to learn how to manage a gluten-free diet, especially at first. But it usually gets easier with practice and over time.\par \par \par Which foods can I eat?\par Foods that are gluten free and fine to eat include the following (table 1):\par \par ?Rice, corn, potatoes, quinoa, millet, buckwheat, and soybeans\par \par ?Special flours, pasta, and other products made from these foods and labeled "gluten free"\par \par ?Fruits and vegetables\par \par ?Meat and eggs\par \par ?Wine and distilled alcoholic drinks, such as rum, tequila, vodka, and whiskey\par \par \par Milk, cheese, and other dairy foods are also gluten free. But many people with celiac disease have trouble digesting these foods, especially at first. Doctors usually recommend that people with celiac disease avoid eating dairy products, at least for a short time, while their intestines are healing.\par \par \par Which foods should I not eat?\par You need to avoid all foods made from or with wheat, rye, and barley (table 2). Ask your doctor or dietitian if you can eat oats.\par \par Many types of foods contain or might contain gluten, such as:\par \par ?Flour, breads, crackers, muffins, and baking mixes\par \par ?Pasta, pastries, and cereals\par \par ?Some sauces, spreads, spices, condiments, and salad dressings\par \par ?Processed meats and meat substitutes (like vegetarian burgers)\par \par ?Beers, ales, lagers, and malt vinegars\par \par \par To know exactly which foods you can eat, you will have to read ingredient labels. Foods that are labeled "gluten free" or say they are made or processed in a "gluten-free facility" are fine to eat. Foods that contain wheat are not fine to eat. If you are unsure whether a food is gluten free, call the company. Their phone number should be on the package.\par \par Some medicines (both prescription and over-the-counter) and vitamin supplements contain a small amount of gluten. But you can still take most types of pills if you have celiac disease. Check with your doctor or nurse if you are not sure.\par \par \par Will I need to take vitamins?\par You might. Celiac disease can keep your digestive system from normally absorbing the nutrients in foods. To get all the nutrients you need, your doctor or nurse might recommend that you take a daily vitamin.\par \par \par Can I eat out?\par Yes. Many restaurants now have gluten-free menus or foods. But always let the restaurant know you can't have gluten. That way, they can be extra careful when they cook your food.\par 3 lactose intolerance continue  lactaid \par 4  dilated pancreatic duct  repeat mrcp \par 5 fatty liver   Fatty Liver: The patient denies any jaundice or pruritus. The patient denies any alcohol use. The patient denies taking large doses of nonsteroidal anti-inflammatory drugs or acetaminophen. The findings are suggestive of fatty liver. The patient and I had a long discussion regarding the risks of fatty liver progressing to cirrhosis. The patient was told of the possible increased risk of developing liver failure, cirrhosis, ascites, GI bleeding secondary to varices, hepatic encephalopathy, bleeding tendencies and liver cancer. The patient was told of the importance of follow-up. The patient was advised to follow up every 6 months for blood work and imaging studies. The patient agreed and will follow up. The patient was advised to lose weight. I recommend a trial of vitamin E supplementation for the fatty liver. If the liver enzymes remain elevated, the patient may require a trial of Pioglitazone for the fatty liver. I recommend avoid alcohol and hepato-toxic agents. The patient was also advised to avoid NSAIDs, Acetaminophen and any other hepatotoxic drugs. The patient was also advised not to share needles, razors, scissors, nail clippers, etc.. The patient is to continue close follow-up in our office for blood work and exams. If the liver enzymes remain elevated, the patient may require a CT guided liver biopsy to assess the liver parenchyma and for possible treatment. We had a long discussion regarding the risks and benefits of the procedure. The patient was told of the risks of bleeding, perforation, infections, emergency surgery and missing lesions. The patient agreed and will follow-up to reassess the symptoms Patient has been counseled on life style interventions, including but not limited to: weight loss (3-5% loss of body weight might improve fat in the liver, while 7-10% needed for potential improvement of other components, including inflammation and scarring of the liver, called fibrosis); healthy diet, avoiding added sugars, sodas, avoiding saturated fats, limiting sodium, avoiding alcohol; and on the importance of regular exercise (> 150 min/week moderate intensity aerobic exercise with at least 2x/week muscle strengthening or exercise as tolerated). \par - I explained to patient the natural Hx of NAFLD. \par \par 6 hypomag  recheck her levels  \par 7 dm  followed by dr Stanley   ---The following has been discussed:---\par -Targets for weight and HgA1c have been discussed with patient \par -FS goals have been reviewed with the patient in detail:\par AM <130 post meal<160-180\par -Diet and weight goals have been discussed with the patient in detail.\par -The importance of exercise in the treatment of diabetes has been discussed \par with the patient in detail.\par -Extensive dietary advice provided to patient and the need to avoid concentrated \par sweets/simple carbohydrates and to ensure to consume protein with each meal. \par -Patient instructed to limit carbohydrates to 60 gms per meal and 15 gms per \par snacks. \par -Patient to keep a blood sugar log to check fasting and before meals\par -Patient instructed on importance of daily feet inspection and to reports any \par open lesions to physician promptly\par \par  she saw her pcp 3 weeks ago.   Dr Holden Morales [phone]\par 8  tubular adenoma  repeat colonosocopy in 2023

## 2022-01-10 NOTE — HISTORY OF PRESENT ILLNESS
[Heartburn] : denies heartburn [Nausea] : denies nausea [Vomiting] : denies vomiting [Diarrhea] : denies diarrhea [Constipation] : denies constipation [Yellow Skin Or Eyes (Jaundice)] : denies jaundice [Abdominal Pain] : denies abdominal pain [Abdominal Swelling] : denies abdominal swelling [Rectal Pain] : denies rectal pain [Wt Gain ___ Lbs] : recent [unfilled] ~Upound(s) weight gain [GERD] : gastroesophageal reflux disease [Abdominal Surgery] : abdominal surgery [Hiatus Hernia] : no hiatus hernia [Peptic Ulcer Disease] : no peptic ulcer disease [Pancreatitis] : no pancreatitis [Cholelithiasis] : no cholelithiasis [Kidney Stone] : no kidney stone [Inflammatory Bowel Disease] : no inflammatory bowel disease [Irritable Bowel Syndrome] : no irritable bowel syndrome [Diverticulitis] : no diverticulitis [Alcohol Abuse] : no alcohol abuse [Malignancy] : no malignancy [Appendectomy] : no appendectomy [Cholecystectomy] : no cholecystectomy [de-identified] : Pt has not seen nutritionist  due to covid.  she has occ increased gas  and doesn’t have nausea or vomiting but has occ has loose stools  .  she is taking her lactaid.  but is not following a gluten free diet.  she doesn’t have rectal bleeding or reflux of acid .  she doesn’t have abdominal pain . she has gained 5 lbs  and is eating well no dysphagia.

## 2022-01-10 NOTE — REASON FOR VISIT
[Follow-Up: _____] : a [unfilled] follow-up visit [[employer] ] : provided by [employer]   [Time Spent: ____ minutes] : Total time spent using  services: [unfilled] minutes. The patient's primary language is not English thus required  services. [Interpreters_IDNumber] : [id number] [Interpreters_FullName] : dinora  [TWNoteComboBox1] : Malagasy

## 2022-01-11 LAB
25(OH)D3 SERPL-MCNC: 48.4 NG/ML
ALBUMIN SERPL ELPH-MCNC: 4.4 G/DL
ALP BLD-CCNC: 56 U/L
ALT SERPL-CCNC: 16 U/L
ANION GAP SERPL CALC-SCNC: 13 MMOL/L
AST SERPL-CCNC: 15 U/L
BILIRUB SERPL-MCNC: 0.3 MG/DL
BUN SERPL-MCNC: 19 MG/DL
CALCIUM SERPL-MCNC: 10.1 MG/DL
CHLORIDE SERPL-SCNC: 104 MMOL/L
CO2 SERPL-SCNC: 26 MMOL/L
CREAT SERPL-MCNC: 0.72 MG/DL
GLUCOSE SERPL-MCNC: 111 MG/DL
IGA SER QL IEP: 320 MG/DL
MAGNESIUM SERPL-MCNC: 1.5 MG/DL
POTASSIUM SERPL-SCNC: 4.5 MMOL/L
PROT SERPL-MCNC: 6.8 G/DL
SODIUM SERPL-SCNC: 143 MMOL/L

## 2022-01-11 RX ORDER — OMEGA-3/DHA/EPA/FISH OIL 300-1000MG
400 CAPSULE ORAL
Qty: 100 | Refills: 1 | Status: ACTIVE | COMMUNITY
Start: 2020-09-11 | End: 1900-01-01

## 2022-01-12 LAB
TTG IGA SER IA-ACNC: <1.2 U/ML
TTG IGA SER-ACNC: NEGATIVE
TTG IGG SER IA-ACNC: 4.9 U/ML
TTG IGG SER IA-ACNC: NEGATIVE

## 2022-01-25 LAB
ALBUMIN SERPL ELPH-MCNC: 4.8 G/DL
ALP BLD-CCNC: 61 U/L
ALT SERPL-CCNC: 13 U/L
ANION GAP SERPL CALC-SCNC: 18 MMOL/L
AST SERPL-CCNC: 16 U/L
BILIRUB DIRECT SERPL-MCNC: 0.1 MG/DL
BILIRUB INDIRECT SERPL-MCNC: 0.2 MG/DL
BILIRUB SERPL-MCNC: 0.3 MG/DL
BUN SERPL-MCNC: 21 MG/DL
CALCIUM SERPL-MCNC: 9.9 MG/DL
CHLORIDE SERPL-SCNC: 103 MMOL/L
CHOLEST SERPL-MCNC: 150 MG/DL
CO2 SERPL-SCNC: 22 MMOL/L
CREAT SERPL-MCNC: 0.73 MG/DL
CREAT SPEC-SCNC: 74 MG/DL
ESTIMATED AVERAGE GLUCOSE: 163 MG/DL
FRUCTOSAMINE SERPL-MCNC: 292 UMOL/L
GLUCOSE BS SERPL-MCNC: 146 MG/DL
GLUCOSE SERPL-MCNC: 148 MG/DL
HBA1C MFR BLD HPLC: 7.3 %
HDLC SERPL-MCNC: 58 MG/DL
LDLC SERPL CALC-MCNC: 62 MG/DL
MICROALBUMIN 24H UR DL<=1MG/L-MCNC: <1.2 MG/DL
MICROALBUMIN/CREAT 24H UR-RTO: NORMAL MG/G
NONHDLC SERPL-MCNC: 93 MG/DL
POTASSIUM SERPL-SCNC: 4.9 MMOL/L
PROT SERPL-MCNC: 6.8 G/DL
SODIUM SERPL-SCNC: 143 MMOL/L
T4 FREE SERPL-MCNC: 1.4 NG/DL
TRIGL SERPL-MCNC: 156 MG/DL
TSH SERPL-ACNC: 2.21 UIU/ML

## 2022-01-27 ENCOUNTER — APPOINTMENT (OUTPATIENT)
Dept: RHEUMATOLOGY | Facility: CLINIC | Age: 82
End: 2022-01-27
Payer: MEDICAID

## 2022-01-27 ENCOUNTER — APPOINTMENT (OUTPATIENT)
Dept: ENDOCRINOLOGY | Facility: CLINIC | Age: 82
End: 2022-01-27
Payer: MEDICAID

## 2022-01-27 VITALS
BODY MASS INDEX: 31.28 KG/M2 | WEIGHT: 145 LBS | RESPIRATION RATE: 16 BRPM | DIASTOLIC BLOOD PRESSURE: 73 MMHG | HEIGHT: 57 IN | TEMPERATURE: 97.7 F | HEART RATE: 84 BPM | OXYGEN SATURATION: 98 % | SYSTOLIC BLOOD PRESSURE: 123 MMHG

## 2022-01-27 DIAGNOSIS — E66.3 OVERWEIGHT: ICD-10-CM

## 2022-01-27 DIAGNOSIS — M77.01 MEDIAL EPICONDYLITIS, RIGHT ELBOW: ICD-10-CM

## 2022-01-27 PROCEDURE — 99443: CPT

## 2022-01-27 PROCEDURE — 20550 NJX 1 TENDON SHEATH/LIGAMENT: CPT | Mod: RT

## 2022-01-27 PROCEDURE — 99213 OFFICE O/P EST LOW 20 MIN: CPT | Mod: 25

## 2022-01-27 RX ORDER — METHYLPRED ACET/NACL,ISO-OS/PF 80 MG/ML
80 VIAL (ML) INJECTION
Qty: 1 | Refills: 0 | Status: COMPLETED | OUTPATIENT
Start: 2022-01-27

## 2022-01-27 RX ORDER — LIDOCAINE HYDROCHLORIDE 10 MG/ML
1 INJECTION, SOLUTION INFILTRATION; PERINEURAL
Qty: 0 | Refills: 0 | Status: COMPLETED | OUTPATIENT
Start: 2022-01-27

## 2022-01-27 RX ADMIN — METHYLPREDNISOLONE ACETATE 0 MG/ML: 80 INJECTION, SUSPENSION INTRA-ARTICULAR; INTRALESIONAL; INTRAMUSCULAR; SOFT TISSUE at 00:00

## 2022-01-27 RX ADMIN — LIDOCAINE HYDROCHLORIDE 0 %: 10 INJECTION, SOLUTION INFILTRATION; PERINEURAL at 00:00

## 2022-01-27 NOTE — DATA REVIEWED
[FreeTextEntry1] : The FBS and HbA1c have deteriorated slightly. The renal function is fine. The thyroid tests are normal.

## 2022-01-27 NOTE — ASSESSMENT
[FreeTextEntry1] : The diabetes has deteriorated slightly\par Advised to follow the diet and lose weight\par Will renew the medications\par Will repeat the blood tests before next visit

## 2022-01-27 NOTE — HISTORY OF PRESENT ILLNESS
[Home] : at home, [unfilled] , at the time of the visit. [Medical Office: ([facility])___] : at the medical office located in  [Verbal consent obtained from patient] : the patient, [unfilled] [FreeTextEntry1] : Patient feels well , she is asymptomatic. Her weight has not changed. She is walking regularly and following the diet. Her blood glucose at home are well controlled, they are usually around 120 to 130 mg/dl. The FBS in the laboratory was 7.3 mg/dl and the HbA1c was 7.3 %, increased since last visit. The renal function is within normal limits, the microalbumin in the urine was normal. The lipid panel was within normal limits. She denies low blood glucose during the night. She denies chest pain, or SOB. Denies numbness, tingling or burning sensation on her extremities. Taking her medications regularly. [place] traveling [place] and she needs extra medications

## 2022-01-28 NOTE — REVIEW OF SYSTEMS
[Fever] : no fever [Chills] : no chills [Eye Pain] : no eye pain [Sore Throat] : no sore throat [Hoarseness] : no hoarseness [Chest Pain] : no chest pain [Palpitations] : no palpitations [SOB on Exertion] : no shortness of breath during exertion [Dysuria] : no dysuria [Arthralgias] : arthralgias [Joint Swelling] : no joint swelling [Joint Stiffness] : joint stiffness [Skin Lesions] : no skin lesions [Difficulty Walking] : difficulty walking [Depression] : no depression [Muscle Weakness] : no muscle weakness [Feelings Of Weakness] : no feelings of weakness [Easy Bleeding] : no tendency for easy bleeding [Easy Bruising] : no tendency for easy bruising

## 2022-01-28 NOTE — REASON FOR VISIT
[Follow-Up: _____] : a [unfilled] follow-up visit [[employer] ] : provided by [employer]   [Time Spent: ____ minutes] : Total time spent using  services: [unfilled] minutes. The patient's primary language is not English thus required  services. [Interpreters_IDNumber] : [id number] [Interpreters_FullName] : Chely

## 2022-01-28 NOTE — HISTORY OF PRESENT ILLNESS
[FreeTextEntry1] : Patient reports for followup of explains tolerating hydroxychloroquine daily. She's not been able to take alternate day dosing and she was only given 30 pills from pharmacy.  She explains intermittent left shoulder pain that interferes with daily activities such as combing her reaching for the back. She finds the pain is localized without accompanying paresthesias. She denies inciting factors or trauma. She reports the injection given into the right basilar joint has improved stiffness;  she no longer has locking of the digit. She otherwise denies motor or sensory disturbances or systemic symptoms.\par Recent bone density reflects osteoporosis in the right femoral head at a T score of -2.5. Patient continues on calcium and vitamin D twice daily along with bisphosphate therapy weekly. She otherwise denies recent falls, fractures or dentition loss. \par She further denies new systemic symptoms or symptoms of Raynaud's, rash or photosensitivity.

## 2022-01-28 NOTE — ASSESSMENT
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   She understands the importance of yearly Opthalmology screens for visual screen testing to assess for retinal toxicity.  She understands the importance of sun avoidance and the application of SPF protection as well as the use of wide brim hats.Emphasized importance of health screening in the setting of ds DNA Abs in this age group. Renal mass detected on CT abd followed by Renal. \par \par PT continued for b/l shoulders.  She currently does not have other systemic symptoms.  Topical anti-inflammatory given for pain relief. 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  Cortisone injection given to RT medial epicondyles for pain relief.  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  \par \par Patient will continue with lifestyle and exercise modification to address fatty liver and cardiovascular health. \par She will continue on Calcium and VitD at the recommended doses of 1200mg and 800IU daily, respectively.  We reviewed calcium and VitD enriched foods as well. Recommend  bisphosphonate therapy weekly.\par \par \par She is in agreement with the above plan and will return in three month' time.\par  \par

## 2022-01-28 NOTE — ASSESSMENT
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   She understands the importance of yearly Opthalmology screens for visual screen testing to assess for retinal toxicity.  She understands the importance of sun avoidance and the application of SPF protection as well as the use of wide brim hats.Emphasized importance of health screening in the setting of ds DNA Abs in this age group. Renal mass detected on CT abd followed by Renal. \par \par PT continued for b/l shoulders.  She currently does not have other systemic symptoms.  Topical anti-inflammatory given for pain relief. 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  \par \par Patient will continue with lifestyle and exercise modification to address fatty liver and cardiovascular health. \par She will continue on Calcium and VitD at the recommended doses of 1200mg and 800IU daily, respectively.  We reviewed calcium and VitD enriched foods as well. Recommend  bisphosphonate therapy weekly.\par \par \par She is in agreement with the above plan and will return in three month' time.\par  \par

## 2022-01-28 NOTE — HISTORY OF PRESENT ILLNESS
[FreeTextEntry1] : Patient reports for followup of explains tolerating hydroxychloroquine with alternate day dosing daily. \par Blood  testing with bone marrow, hepatic and renal function within range   She denies accompanied joint pain save the right elbow, noted after heavy lifting. She explains  pain wakes her up at night. She refrains from NSAIDs tx and takes intermittent Acetaminophen tx  \par Recent bone density reflects osteoporosis in the right femoral head at a T score of -2.5. Patient continues on calcium and vitamin D twice daily along with bisphosphate therapy weekly. She otherwise denies recent falls, fractures or dentition loss. \par She further denies new systemic symptoms or symptoms of Raynaud's, rash or photosensitivity.

## 2022-01-28 NOTE — PROCEDURE
[Other Date:___] : Date: [unfilled] [Patient] : the patient [Risks] : risks [Benefits] : benefits [Consent Obtained] : written consent was obtained prior to the procedure and is detailed in the patient's record [#1 Site: ______] : #1 site identified in the [unfilled] [Betadine] : betadine solution [25 gauge 1 inch] : A 25 gauge 1 inch needle was used [Depomedrol ___ mg] : Depomedrol [unfilled] mg [Tolerated Well] : the patient tolerated the procedure well [No Complications] : there were no complications [Patient Instructed to Call] : patient was instructed to call if redness at site, a decrease in range of motion or an increase in pain is noted after procedure.

## 2022-02-01 ENCOUNTER — APPOINTMENT (OUTPATIENT)
Dept: MRI IMAGING | Facility: HOSPITAL | Age: 82
End: 2022-02-01
Payer: MEDICAID

## 2022-02-01 ENCOUNTER — OUTPATIENT (OUTPATIENT)
Dept: OUTPATIENT SERVICES | Facility: HOSPITAL | Age: 82
LOS: 1 days | End: 2022-02-01
Payer: MEDICAID

## 2022-02-01 DIAGNOSIS — Z90.710 ACQUIRED ABSENCE OF BOTH CERVIX AND UTERUS: Chronic | ICD-10-CM

## 2022-02-01 DIAGNOSIS — Z98.890 OTHER SPECIFIED POSTPROCEDURAL STATES: Chronic | ICD-10-CM

## 2022-02-01 DIAGNOSIS — K86.89 OTHER SPECIFIED DISEASES OF PANCREAS: ICD-10-CM

## 2022-02-01 DIAGNOSIS — Z98.41 CATARACT EXTRACTION STATUS, RIGHT EYE: Chronic | ICD-10-CM

## 2022-02-01 PROCEDURE — A9579: CPT

## 2022-02-01 PROCEDURE — 74183 MRI ABD W/O CNTR FLWD CNTR: CPT

## 2022-02-01 PROCEDURE — 74183 MRI ABD W/O CNTR FLWD CNTR: CPT | Mod: 26

## 2022-02-10 ENCOUNTER — APPOINTMENT (OUTPATIENT)
Dept: RHEUMATOLOGY | Facility: CLINIC | Age: 82
End: 2022-02-10
Payer: MEDICAID

## 2022-02-10 ENCOUNTER — APPOINTMENT (OUTPATIENT)
Dept: INTERNAL MEDICINE | Facility: CLINIC | Age: 82
End: 2022-02-10
Payer: MEDICAID

## 2022-02-10 DIAGNOSIS — I10 ESSENTIAL (PRIMARY) HYPERTENSION: ICD-10-CM

## 2022-02-10 PROCEDURE — 99213 OFFICE O/P EST LOW 20 MIN: CPT | Mod: 25

## 2022-02-10 PROCEDURE — 99442: CPT

## 2022-02-10 PROCEDURE — 20610 DRAIN/INJ JOINT/BURSA W/O US: CPT | Mod: LT

## 2022-02-10 RX ORDER — LIDOCAINE HYDROCHLORIDE 10 MG/ML
1 INJECTION, SOLUTION INFILTRATION; PERINEURAL
Qty: 0 | Refills: 0 | Status: COMPLETED | OUTPATIENT
Start: 2022-02-10

## 2022-02-10 RX ORDER — METHYLPRED ACET/NACL,ISO-OS/PF 80 MG/ML
80 VIAL (ML) INJECTION
Qty: 1 | Refills: 0 | Status: COMPLETED | OUTPATIENT
Start: 2022-02-10

## 2022-02-10 RX ADMIN — METHYLPREDNISOLONE ACETATE 0 MG/ML: 80 INJECTION, SUSPENSION INTRA-ARTICULAR; INTRALESIONAL; INTRAMUSCULAR; SOFT TISSUE at 00:00

## 2022-02-10 RX ADMIN — LIDOCAINE HYDROCHLORIDE 0 %: 10 INJECTION, SOLUTION INFILTRATION; PERINEURAL at 00:00

## 2022-02-10 NOTE — HISTORY OF PRESENT ILLNESS
[FreeTextEntry1] : Patient reports tolerating hydroxychloroquine with alternate day dosing daily. \par Blood  testing with bone marrow, hepatic and renal function within range .  She denies accompanied joint pain save the LT shoulder, noted after heavy lifting. She explains pain wakes her up at night when laying on the left side. she denies accompanied paresthesias.  She refrains from NSAIDs tx and takes intermittent Acetaminophen tx.   RT medial epicondylitis has improved since last encounter.\par Recent bone density reflects osteoporosis in the right femoral head at a T score of -2.5. Patient continues on calcium and vitamin D twice daily along with bisphosphate therapy weekly. She otherwise denies recent falls, fractures or dentition loss.  Current Hba1c in the 8 range; patient finds she is trying harder at dietary modifications.  \par She further denies new systemic symptoms or symptoms of Raynaud's, rash or photosensitivity.

## 2022-02-10 NOTE — PHYSICAL EXAM
[General Appearance - Alert] : alert [General Appearance - In No Acute Distress] : in no acute distress [Sclera] : the sclera and conjunctiva were normal [Nail Clubbing] : no clubbing  or cyanosis of the fingernails [FreeTextEntry1] : No active synovitis; minimal tenderness along the epicondyles; shoulder abduction to 140° on the left with pain on internal rotation with tenderness at over the left subacromial bursa [] : no rash [Skin Lesions] : no skin lesions [Motor Exam] : the motor exam was normal [Oriented To Time, Place, And Person] : oriented to person, place, and time [Impaired Insight] : insight and judgment were intact

## 2022-02-10 NOTE — ASSESSMENT
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   She understands the importance of yearly Opthalmology screens for visual screen testing to assess for retinal toxicity.  She understands the importance of sun avoidance and the application of SPF protection as well as the use of wide brim hats. Emphasized importance of health screening in the setting of ds DNA Abs in this age group. Renal mass detected on CT abd followed by Renal. \par \par PT continued for b/l shoulders.  She currently does not have other systemic symptoms.  Topical anti-inflammatory given for pain relief. 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  Cortisone injection given to LT SAB for pain relief.  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  \par \par Patient will continue with lifestyle and exercise modification to address fatty liver and cardiovascular health. \par She will continue on Calcium and VitD at the recommended doses of 1200mg and 800IU daily, respectively.  We reviewed calcium and VitD enriched foods as well. Recommend  bisphosphonate therapy weekly.\par \par \par She is in agreement with the above plan and will return in three month' time.\par  \par

## 2022-04-04 ENCOUNTER — RX RENEWAL (OUTPATIENT)
Age: 82
End: 2022-04-04

## 2022-05-23 ENCOUNTER — APPOINTMENT (OUTPATIENT)
Dept: INTERNAL MEDICINE | Facility: CLINIC | Age: 82
End: 2022-05-23

## 2022-06-21 LAB
ALBUMIN SERPL ELPH-MCNC: 4.8 G/DL
ALP BLD-CCNC: 65 U/L
ALT SERPL-CCNC: 16 U/L
AST SERPL-CCNC: 21 U/L
BILIRUB DIRECT SERPL-MCNC: 0.2 MG/DL
BILIRUB INDIRECT SERPL-MCNC: 0.4 MG/DL
BILIRUB SERPL-MCNC: 0.6 MG/DL
CHOLEST SERPL-MCNC: 141 MG/DL
CREAT SPEC-SCNC: 79 MG/DL
ESTIMATED AVERAGE GLUCOSE: 166 MG/DL
FRUCTOSAMINE SERPL-MCNC: 305 UMOL/L
GLUCOSE BS SERPL-MCNC: 124 MG/DL
HBA1C MFR BLD HPLC: 7.4 %
HDLC SERPL-MCNC: 57 MG/DL
LDLC SERPL CALC-MCNC: 57 MG/DL
MICROALBUMIN 24H UR DL<=1MG/L-MCNC: <1.2 MG/DL
MICROALBUMIN/CREAT 24H UR-RTO: NORMAL MG/G
NONHDLC SERPL-MCNC: 83 MG/DL
PROT SERPL-MCNC: 6.9 G/DL
T4 FREE SERPL-MCNC: 1.3 NG/DL
TRIGL SERPL-MCNC: 131 MG/DL
TSH SERPL-ACNC: 2.29 UIU/ML

## 2022-06-23 ENCOUNTER — APPOINTMENT (OUTPATIENT)
Dept: ENDOCRINOLOGY | Facility: CLINIC | Age: 82
End: 2022-06-23
Payer: MEDICAID

## 2022-06-23 ENCOUNTER — APPOINTMENT (OUTPATIENT)
Dept: RHEUMATOLOGY | Facility: CLINIC | Age: 82
End: 2022-06-23
Payer: MEDICAID

## 2022-06-23 VITALS
RESPIRATION RATE: 16 BRPM | HEIGHT: 57 IN | DIASTOLIC BLOOD PRESSURE: 77 MMHG | OXYGEN SATURATION: 97 % | WEIGHT: 141 LBS | HEART RATE: 82 BPM | TEMPERATURE: 97.8 F | SYSTOLIC BLOOD PRESSURE: 119 MMHG | BODY MASS INDEX: 30.42 KG/M2

## 2022-06-23 DIAGNOSIS — E55.9 VITAMIN D DEFICIENCY, UNSPECIFIED: ICD-10-CM

## 2022-06-23 LAB — GLUCOSE BLDC GLUCOMTR-MCNC: 105

## 2022-06-23 PROCEDURE — 99214 OFFICE O/P EST MOD 30 MIN: CPT

## 2022-06-23 PROCEDURE — T1013: CPT

## 2022-06-23 PROCEDURE — 82962 GLUCOSE BLOOD TEST: CPT

## 2022-06-23 PROCEDURE — 99214 OFFICE O/P EST MOD 30 MIN: CPT | Mod: 25

## 2022-06-23 NOTE — PHYSICAL EXAM
[Alert] : alert [No Acute Distress] : no acute distress [No Neck Mass] : no neck mass was observed [Thyroid Not Enlarged] : the thyroid was not enlarged [No Respiratory Distress] : no respiratory distress [Clear to Auscultation] : lungs were clear to auscultation bilaterally [Normal PMI] : the apical impulse was normal [Normal Rate] : heart rate was normal [Right foot was examined, including] : right foot ~C was examined, including visual inspection with sensory and pulse exams [Left foot was examined, including] : left foot ~C was examined, including visual inspection with sensory and pulse exams [Normal] : normal [2+] : 2+ in the dorsalis pedis [Vibration Dec.] : normal vibratory sensation at the level of the toes [Diminished Throughout Both Feet] : normal tactile sensation with monofilament testing throughout both feet

## 2022-06-23 NOTE — ASSESSMENT
[FreeTextEntry1] : The diabetic control still not improved\par Will add Farxiga 5 mg po QD\par Advised to follow the diet and walk regularly\par Will continue the rest of the treatment the same\par Will repeat the blood tests before next visit\par The hyperlipidemia is stable

## 2022-06-23 NOTE — ASSESSMENT
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   She understands the importance of yearly Opthalmology screens for visual screen testing to assess for retinal toxicity.  She understands the importance of sun avoidance and the application of SPF protection as well as the use of wide brim hats. Emphasized importance of health screening in the setting of ds DNA Abs in this age group. Renal mass detected on CT abd followed by Renal.  Discussed tighter control of sugar levels and the possibility of switching to another agent; will reach out to endocrine team.\par \par PT continued for b/l shoulders.  She currently does not have other systemic symptoms.  Topical anti-inflammatory given for pain relief. 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  \par \par Patient will continue with lifestyle and exercise modification to address fatty liver and cardiovascular health. \par She will continue on Calcium and VitD at the recommended doses of 1200mg and 800IU daily, respectively.  We reviewed calcium and VitD enriched foods as well. Recommend  bisphosphonate therapy weekly; prescription renewed.\par \par \par She is in agreement with the above plan and will return in three month' time.\par  \par

## 2022-06-23 NOTE — REASON FOR VISIT
[Follow-Up: _____] : a [unfilled] follow-up visit [[employer] ] : provided by [employer]   [Time Spent: ____ minutes] : Total time spent using  services: [unfilled] minutes. The patient's primary language is not English thus required  services. [Interpreters_IDNumber] : [id number] [Interpreters_FullName] : Brendon

## 2022-06-23 NOTE — DATA REVIEWED
[FreeTextEntry1] : The FBS and HbA1c remain slightly elevated. The lipid panel was fine. The microalbumin in the urine was fine.

## 2022-06-23 NOTE — HISTORY OF PRESENT ILLNESS
[FreeTextEntry1] : Patient feels well , she is asymptomatic. Her weight has not changed. She is walking regularly and following the diet. Her blood glucose at home are well controlled, they are usually around 100 to 110 mg/dl. She denies low blood glucose during the night. She denies chest pain, or SOB. Denies numbness, tingling or burning sensation on her extremities. Taking her medications regularly. She has seen the Ophthalmologist recently. She has seen Podiatrist recently. She has not seen the Cardiologist recently.\par

## 2022-06-23 NOTE — HISTORY OF PRESENT ILLNESS
[FreeTextEntry1] : Patient reports tolerating hydroxychloroquine with alternate day dosing daily.  Recent blood testing shows hemoglobin A1c of 7.4; patient reports the appropriate dietary modifications.  Blood  testing with hepatic and renal function within range .  She denies accompanied joint pain and finds  the LT shoulder, having improved since last visit. She refrains from NSAIDs tx and takes intermittent Acetaminophen tx.   RT medial epicondylitis has improved since last encounter.\par Bone density reflects osteoporosis in the right femoral head at a T score of -2.5. Patient continues on calcium and vitamin D twice daily along with bisphosphate therapy weekly. She otherwise denies recent falls, fractures or dentition loss.  \par She further denies new systemic symptoms or symptoms of Raynaud's, rash or photosensitivity.

## 2022-06-23 NOTE — PHYSICAL EXAM
[General Appearance - Alert] : alert [General Appearance - In No Acute Distress] : in no acute distress [Sclera] : the sclera and conjunctiva were normal [Nail Clubbing] : no clubbing  or cyanosis of the fingernails [] : no rash [Skin Lesions] : no skin lesions [Motor Exam] : the motor exam was normal [Oriented To Time, Place, And Person] : oriented to person, place, and time [Impaired Insight] : insight and judgment were intact [FreeTextEntry1] : No active synovitis; minimal tenderness along the epicondyles; shoulder abduction to 140° on the left with pain on internal rotation with tenderness at over the left subacromial bursa

## 2022-08-02 ENCOUNTER — APPOINTMENT (OUTPATIENT)
Dept: RHEUMATOLOGY | Facility: CLINIC | Age: 82
End: 2022-08-02

## 2022-08-02 VITALS
HEIGHT: 57 IN | DIASTOLIC BLOOD PRESSURE: 81 MMHG | TEMPERATURE: 97.8 F | OXYGEN SATURATION: 97 % | RESPIRATION RATE: 16 BRPM | BODY MASS INDEX: 30.2 KG/M2 | HEART RATE: 83 BPM | WEIGHT: 140 LBS | SYSTOLIC BLOOD PRESSURE: 130 MMHG

## 2022-08-02 DIAGNOSIS — I83.93 ASYMPTOMATIC VARICOSE VEINS OF BILATERAL LOWER EXTREMITIES: ICD-10-CM

## 2022-08-02 PROCEDURE — 99213 OFFICE O/P EST LOW 20 MIN: CPT | Mod: 25

## 2022-08-02 PROCEDURE — 20610 DRAIN/INJ JOINT/BURSA W/O US: CPT | Mod: LT

## 2022-08-02 RX ORDER — METHYLPRED ACET/NACL,ISO-OS/PF 40 MG/ML
40 VIAL (ML) INJECTION
Qty: 1 | Refills: 0 | Status: COMPLETED | OUTPATIENT
Start: 2022-08-02

## 2022-08-02 RX ORDER — LIDOCAINE HYDROCHLORIDE 10 MG/ML
1 INJECTION, SOLUTION INFILTRATION; PERINEURAL
Qty: 0 | Refills: 0 | Status: COMPLETED | OUTPATIENT
Start: 2022-08-02

## 2022-08-02 RX ADMIN — LIDOCAINE HYDROCHLORIDE 0 %: 10 INJECTION, SOLUTION EPIDURAL; INFILTRATION; INTRACAUDAL; PERINEURAL at 00:00

## 2022-08-02 RX ADMIN — METHYLPREDNISOLONE ACETATE 0 MG/ML: 40 INJECTION, SUSPENSION INTRA-ARTICULAR; INTRALESIONAL; INTRAMUSCULAR; INTRASYNOVIAL; SOFT TISSUE at 00:00

## 2022-08-02 NOTE — REASON FOR VISIT
[Follow-Up: _____] : a [unfilled] follow-up visit [[employer] ] : provided by [employer]   [Interpreters_IDNumber] : [id number] [Interpreters_FullName] : Brendon

## 2022-08-02 NOTE — HISTORY OF PRESENT ILLNESS
[FreeTextEntry1] : Patient reports tolerating hydroxychloroquine with alternate day dosing daily.    Blood  testing with hepatic and renal function within range .  She explains resurfacing of left shoulder pain while combing hair or reaching for her lower back.  She refrains from NSAIDs tx and takes intermittent Acetaminophen tx.  Systolic blood pressure in range at 130; she inquires about a blood pressure kit.  She is to see a vascular in the upcoming weeks for lower extremity varicosities; she notes difficulty in wearing compression stockings.\par RT medial epicondylitis has improved since last encounter.\par Bone density reflects osteoporosis in the right femoral head at a T score of -2.5. Patient continues on calcium and vitamin D twice daily along with bisphosphate therapy weekly. She otherwise denies recent falls, fractures or dentition loss.  \par She further denies new systemic symptoms or symptoms of Raynaud's, rash or photosensitivity.

## 2022-08-02 NOTE — ASSESSMENT
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   She understands the importance of yearly Opthalmology screens for visual screen testing to assess for retinal toxicity.  She understands the importance of sun avoidance and the application of SPF protection as well as the use of wide brim hats. Emphasized importance of health screening in the setting of ds DNA Abs in this age group. Renal mass detected on CT abd followed by Renal.  \par \par PT continued for b/l shoulders.  Topical anti-inflammatory given for pain relief. 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  Left subacromial bursa cortisone injection given for pain relief.   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  \par \par Patient will continue with lifestyle and exercise modification to address fatty liver and cardiovascular health. \par She will continue on Calcium and VitD at the recommended doses of 1200mg and 800IU daily, respectively.  We reviewed calcium and VitD enriched foods as well. Recommend  bisphosphonate therapy weekly; prescription renewed.\par \par Compression socks recommended through Internet resources;  leg elevation encouraged\par \par \par She is in agreement with the above plan and will return in three month' time.\par  \par

## 2022-08-03 ENCOUNTER — APPOINTMENT (OUTPATIENT)
Dept: INTERNAL MEDICINE | Facility: CLINIC | Age: 82
End: 2022-08-03

## 2022-08-03 VITALS
SYSTOLIC BLOOD PRESSURE: 121 MMHG | HEIGHT: 57 IN | DIASTOLIC BLOOD PRESSURE: 74 MMHG | OXYGEN SATURATION: 98 % | BODY MASS INDEX: 30.63 KG/M2 | WEIGHT: 142 LBS | HEART RATE: 80 BPM | TEMPERATURE: 97.2 F

## 2022-08-03 DIAGNOSIS — R74.8 ABNORMAL LEVELS OF OTHER SERUM ENZYMES: ICD-10-CM

## 2022-08-03 PROCEDURE — 99213 OFFICE O/P EST LOW 20 MIN: CPT

## 2022-08-03 PROCEDURE — T1013A: CUSTOM

## 2022-08-03 NOTE — PHYSICAL EXAM
[General Appearance - Alert] : alert [General Appearance - In No Acute Distress] : in no acute distress [PERRL With Normal Accommodation] : pupils were equal in size, round, and reactive to light [Oropharynx] : the oropharynx was normal [] : no respiratory distress [Auscultation Breath Sounds / Voice Sounds] : lungs were clear to auscultation bilaterally [Apical Impulse] : the apical impulse was normal [Heart Rate And Rhythm] : heart rate was normal and rhythm regular [Heart Sounds] : normal S1 and S2 [Heart Sounds Gallop] : no gallops [Edema] : there was no peripheral edema [Normal] : normal [Soft, Nontender] : the abdomen was soft and nontender [No Mass] : no masses were palpated [No HSM] : no hepatosplenomegaly noted [Cervical Lymph Nodes Enlarged Posterior Bilaterally] : posterior cervical [Cervical Lymph Nodes Enlarged Anterior Bilaterally] : anterior cervical [No CVA Tenderness] : no ~M costovertebral angle tenderness [Abnormal Walk] : normal gait [Nail Clubbing] : no clubbing  or cyanosis of the fingernails [Motor Tone] : muscle strength and tone were normal [Skin Color & Pigmentation] : normal skin color and pigmentation [Skin Turgor] : normal skin turgor [No Focal Deficits] : no focal deficits [Oriented To Time, Place, And Person] : oriented to person, place, and time [Impaired Insight] : insight and judgment were intact [Affect] : the affect was normal

## 2022-08-03 NOTE — HISTORY OF PRESENT ILLNESS
[Heartburn] : improved heartburn [Nausea] : denies nausea [Vomiting] : denies vomiting [Diarrhea] : denies diarrhea [Constipation] : denies constipation [Yellow Skin Or Eyes (Jaundice)] : denies jaundice [Abdominal Pain] : denies abdominal pain [Abdominal Swelling] : denies abdominal swelling [Rectal Pain] : denies rectal pain [Wt Gain ___ Lbs] : recent [unfilled] ~Upound(s) weight gain [GERD] : gastroesophageal reflux disease [Abdominal Surgery] : abdominal surgery [Good Compliance] : good compliance with treatment [Hiatus Hernia] : no hiatus hernia [Peptic Ulcer Disease] : no peptic ulcer disease [Pancreatitis] : no pancreatitis [Cholelithiasis] : no cholelithiasis [Kidney Stone] : no kidney stone [Inflammatory Bowel Disease] : no inflammatory bowel disease [Irritable Bowel Syndrome] : no irritable bowel syndrome [Diverticulitis] : no diverticulitis [Alcohol Abuse] : no alcohol abuse [Malignancy] : no malignancy [Appendectomy] : no appendectomy [Cholecystectomy] : no cholecystectomy [de-identified] : PT states she is feeling well and doesn’t have nausea or vomiting  diarrhea or constipation.   She doesn’t have dysphagia or  abdominal pain.    She is eating well and has gained weight 2lbs.    Her stool is normal  consistency and texture.    She is fu for her diabetes  with Dr Stanley and for lupus with Dr Veliz and is tolerating her medications.  She has celaic Dq8 positive and has een watching her gluten intake.

## 2022-08-03 NOTE — REASON FOR VISIT
[address] [place] [address] [facility], [address]                                 OPERATIVE REPORT    PATIENT:    Charles Beck  MRN:           [id number]   DATE:   7/3/2022  BILLIN  ROOM:       [facility]  ATTENDING:   Liz Dickens MD  DICTATING:   Liz Dickens MD      Preoperative Dx: Glorine Genre sacral pressure ulcer    Postoperative Dx: Stage IV sacral pressure ulcer     Procedure: Debridement skin subcutaneous tissue muscle and bone stage IV sacral pressure ulcer    Surgeon:  Edgar De La Cruz MD    Assistant: Liza Archuleta    Follow-Up: _____] : a [unfilled] follow-up visit [[employer] ] : provided by [employer]   [Time Spent: ____ minutes] : Total time spent using  services: [unfilled] minutes. The patient's primary language is not English thus required  services. [FreeTextEntry1] : reflux  [Interpreters_IDNumber] : [id number] [Interpreters_FullName] : Tamika

## 2022-08-03 NOTE — ASSESSMENT
[FreeTextEntry1] : 1 celiac she is following gluten free diet.     gluten-free diet is a diet that doesn't contain any gluten. Gluten is a protein that is found in wheat, rye, barley, and (sometimes) oats. Many foods, such as breads, pasta, pizza, cereals, and crackers, have gluten in them. People who are on a gluten-free diet should not eat any foods with gluten.\par \par \par Who should be on a gluten-free diet?\par People with a condition called celiac disease should be on a gluten-free diet. Celiac disease is a condition that affects the body's ability to break down certain foods. People with celiac disease get sick if they eat foods with gluten. They need to be on a strict gluten-free diet for their whole life.\par \par If you think you have celiac disease, don't start a gluten-free diet until after you are tested for the disease. That's because what you eat can affect your test results.\par \par More and more, though, people without celiac disease are eating a gluten-free diet. They might have heard that this diet can help them lose weight or feel better. It's true that a gluten-free diet can be healthy. But it also sometimes keeps people from getting all the nutrition they need. Also, some gluten-free products have a lot of sugar and calories. This can make it harder for some people to stay at a healthy weight. If you are thinking about being on a gluten-free diet, ask your doctor or nurse if it's a good choice for you.\par \par \par How do I get started?\par To get started, you will work with a dietitian (food expert) or other professional who has experience with a gluten-free diet. They will:\par \par ?Teach you which foods are fine to eat and which foods you should avoid\par \par ?Help you plan balanced meals so that you get the nutrition you need\par \par ?Give you gluten-free recipes\par \par ?Help you find gluten-free substitutes for your favorite foods (such as pasta or cookies)\par \par \par You can get advice and help from other people, too. Ask your doctor or nurse if there is a local support group for people with celiac disease. Or you can look at websites, such as www.celiac.org, www.nationalceliac.org, and www.gikids.org.\par \par It can be hard to learn how to manage a gluten-free diet, especially at first. But it usually gets easier with practice and over time.\par \par \par Which foods can I eat?\par Foods that are gluten free and fine to eat include the following (table 1):\par \par ?Rice, corn, potatoes, quinoa, millet, buckwheat, and soybeans\par \par ?Special flours, pasta, and other products made from these foods and labeled "gluten free"\par \par ?Fruits and vegetables\par \par ?Meat and eggs\par \par ?Wine and distilled alcoholic drinks, such as rum, tequila, vodka, and whiskey\par \par \par Milk, cheese, and other dairy foods are also gluten free. But many people with celiac disease have trouble digesting these foods, especially at first. Doctors usually recommend that people with celiac disease avoid eating dairy products, at least for a short time, while their intestines are healing.\par \par \par Which foods should I not eat?\par You need to avoid all foods made from or with wheat, rye, and barley (table 2). Ask your doctor or dietitian if you can eat oats.\par \par Many types of foods contain or might contain gluten, such as:\par \par ?Flour, breads, crackers, muffins, and baking mixes\par \par ?Pasta, pastries, and cereals\par \par ?Some sauces, spreads, spices, condiments, and salad dressings\par \par ?Processed meats and meat substitutes (like vegetarian burgers)\par \par ?Beers, ales, lagers, and malt vinegars\par \par \par To know exactly which foods you can eat, you will have to read ingredient labels. Foods that are labeled "gluten free" or say they are made or processed in a "gluten-free facility" are fine to eat. Foods that contain wheat are not fine to eat. If you are unsure whether a food is gluten free, call the company. Their phone number should be on the package.\par \par Some medicines (both prescription and over-the-counter) and vitamin supplements contain a small amount of gluten. But you can still take most types of pills if you have celiac disease. Check with your doctor or nurse if you are not sure.\par \par \par Will I need to take vitamins?\par You might. Celiac disease can keep your digestive system from normally absorbing the nutrients in foods. To get all the nutrients you need, your doctor or nurse might recommend that you take a daily vitamin.\par \par \par Can I eat out?\par Yes. Many restaurants now have gluten-free menus or foods. But always let the restaurant know you can't have gluten. That way, they can be extra careful when they cook your food.\par 2  pancreatic atrophy    stable  no issues with diarrhea \par 3.  pancreatic cysts stable fu in Feb 2023.\par 4 dyspepsia  discussed Rule of 2's; pt should avoid eating too much; too fast; too spicy; too lousy; less than two hours before bed \par -Things to avoid including overeating, spicy foods, tight clothing, eating within three hours of bed, this list is not all inclusive. \par -For treatment of reflux, possible options discussed including diet control, H2 blockers, PPIs, as well as coating motility agents discussed as treatment options. Timing of meals and proximity of last meal to sleep were discussed. If symptoms persist, a formal gastrointestinal evaluation is needed. \par 5  lactose intolerance   she has episodes with gas and is not  taking medications lactaid before her meals.  \par 6  colon polyp tubular adenoma  in past and will have repat colonoscopy in 2023.

## 2022-08-04 LAB
IGA SER QL IEP: 309 MG/DL
MAGNESIUM SERPL-MCNC: 1.6 MG/DL
TTG IGA SER IA-ACNC: <1.2 U/ML
TTG IGA SER-ACNC: NEGATIVE
TTG IGG SER IA-ACNC: 3.1 U/ML
TTG IGG SER IA-ACNC: NEGATIVE

## 2022-09-01 ENCOUNTER — APPOINTMENT (OUTPATIENT)
Dept: RHEUMATOLOGY | Facility: CLINIC | Age: 82
End: 2022-09-01

## 2022-09-01 VITALS
OXYGEN SATURATION: 98 % | DIASTOLIC BLOOD PRESSURE: 72 MMHG | HEIGHT: 57 IN | HEART RATE: 82 BPM | WEIGHT: 139 LBS | BODY MASS INDEX: 29.99 KG/M2 | RESPIRATION RATE: 16 BRPM | SYSTOLIC BLOOD PRESSURE: 113 MMHG | TEMPERATURE: 97.8 F

## 2022-09-01 PROCEDURE — 99214 OFFICE O/P EST MOD 30 MIN: CPT | Mod: 25

## 2022-09-01 PROCEDURE — 20605 DRAIN/INJ JOINT/BURSA W/O US: CPT | Mod: LT

## 2022-09-01 RX ORDER — METHYLPRED ACET/NACL,ISO-OS/PF 40 MG/ML
40 VIAL (ML) INJECTION
Qty: 1 | Refills: 0 | Status: COMPLETED | OUTPATIENT
Start: 2022-09-01

## 2022-09-01 RX ADMIN — METHYLPREDNISOLONE ACETATE 0 MG/ML: 40 INJECTION, SUSPENSION INTRA-ARTICULAR; INTRALESIONAL; INTRAMUSCULAR; INTRASYNOVIAL; SOFT TISSUE at 00:00

## 2022-09-01 NOTE — REASON FOR VISIT
[Follow-Up: _____] : a [unfilled] follow-up visit [[employer] ] : provided by [employer]   [Interpreters_IDNumber] : [id number] [Interpreters_FullName] : Brendon

## 2022-09-01 NOTE — PHYSICAL EXAM
[General Appearance - Alert] : alert [General Appearance - In No Acute Distress] : in no acute distress [Sclera] : the sclera and conjunctiva were normal [Nail Clubbing] : no clubbing  or cyanosis of the fingernails [FreeTextEntry1] : No active synovitis; moderate tenderness along the medial epicondyle without swelling or erythema ; shoulder abduction maintained on the left with pain on internal rotation with tenderness at over the left subacromial bursa [] : no rash [Skin Lesions] : no skin lesions [Motor Exam] : the motor exam was normal [Oriented To Time, Place, And Person] : oriented to person, place, and time [Impaired Insight] : insight and judgment were intact

## 2022-09-01 NOTE — HISTORY OF PRESENT ILLNESS
[FreeTextEntry1] : Patient reports tolerating hydroxychloroquine with alternate day dosing daily.    Blood  testing with hepatic and renal function within range .  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  She refrains from NSAIDs tx and takes intermittent Acetaminophen tx.  Systolic blood pressure in range at 113; she she was not able to obtain blood pressure kit as she requested last encounter.  \par RT medial epicondylitis has improved since last encounter however she notes resurfacing of pain over the left elbow and finds it may be due to heavy lifting .  He denies accompanied swelling or paresthesias.\par Bone density reflects osteoporosis in the right femoral head at a T score of -2.5. Patient continues on calcium and vitamin D twice daily along with bisphosphate therapy weekly. She otherwise denies recent falls, fractures or dentition loss.  \par She further denies new systemic symptoms or symptoms of Raynaud's, rash or photosensitivity.

## 2022-09-01 NOTE — ASSESSMENT
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   She understands the importance of yearly Opthalmology screens for visual screen testing to assess for retinal toxicity.  She understands the importance of sun avoidance and the application of SPF protection as well as the use of wide brim hats. Emphasized importance of health screening in the setting of ds DNA Abs in this age group. Renal mass detected on CT abd followed by Renal.  \par \par PT continued for b/l shoulders.  Topical anti-inflammatory given for pain relief. PT, ROM and stretching exercises for RTC arthropathy and medial epicondylitis demonstrated.  Cortisone injection given for pain relief for the medial epicondyle.  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  \par \par Patient will continue with lifestyle and exercise modification to address fatty liver and cardiovascular health. \par She will continue on Calcium and VitD at the recommended doses of 1200mg and 800IU daily, respectively.  We reviewed calcium and VitD enriched foods as well. Recommend  bisphosphonate therapy weekly; prescription renewed.\par \par Compression socks recommended through Internet resources;  leg elevation encouraged.  We will follow-up with pharmacy for BP kit as patient request.\par \par \par She is in agreement with the above plan and will return in three month' time.\par  \par

## 2022-09-27 ENCOUNTER — RX RENEWAL (OUTPATIENT)
Age: 82
End: 2022-09-27

## 2022-10-03 ENCOUNTER — APPOINTMENT (OUTPATIENT)
Dept: ENDOCRINOLOGY | Facility: CLINIC | Age: 82
End: 2022-10-03

## 2022-10-03 VITALS
TEMPERATURE: 97.2 F | RESPIRATION RATE: 16 BRPM | DIASTOLIC BLOOD PRESSURE: 79 MMHG | WEIGHT: 137 LBS | HEIGHT: 57 IN | HEART RATE: 102 BPM | SYSTOLIC BLOOD PRESSURE: 120 MMHG | OXYGEN SATURATION: 98 % | BODY MASS INDEX: 29.56 KG/M2

## 2022-10-03 PROCEDURE — 82962 GLUCOSE BLOOD TEST: CPT

## 2022-10-03 PROCEDURE — 83036 HEMOGLOBIN GLYCOSYLATED A1C: CPT | Mod: QW

## 2022-10-03 PROCEDURE — 99214 OFFICE O/P EST MOD 30 MIN: CPT

## 2022-10-03 NOTE — ASSESSMENT
[FreeTextEntry1] : The diabetes is better controlled\par She has lost weight\par She is walking regularly and following the diet\par Will repeat the complete blood tests\par She will call me back for results\par Will continue the same treatment\par

## 2022-10-03 NOTE — HISTORY OF PRESENT ILLNESS
[FreeTextEntry1] : Patient feels well , she is asymptomatic. She has lost weight. She is walking regularly and following the diet. Her blood glucose at home are well controlled, they are usually around 110 mg/dl. She denies low blood glucose during the night. She denies chest pain, or SOB. Denies numbness, tingling or burning sensation on her extremities. Taking her medications regularly. She has seen the Ophthalmologist recently. She has seen Podiatrist recently. She has not seen the Cardiologist recently. So far no Farxiga side effects.\par

## 2022-10-06 LAB
ALBUMIN SERPL ELPH-MCNC: 4.5 G/DL
ALP BLD-CCNC: 67 U/L
ALT SERPL-CCNC: 16 U/L
ANION GAP SERPL CALC-SCNC: 11 MMOL/L
AST SERPL-CCNC: 16 U/L
BILIRUB DIRECT SERPL-MCNC: 0.1 MG/DL
BILIRUB INDIRECT SERPL-MCNC: 0.3 MG/DL
BILIRUB SERPL-MCNC: 0.5 MG/DL
BUN SERPL-MCNC: 20 MG/DL
CALCIUM SERPL-MCNC: 9.9 MG/DL
CHLORIDE SERPL-SCNC: 104 MMOL/L
CHOLEST SERPL-MCNC: 141 MG/DL
CO2 SERPL-SCNC: 26 MMOL/L
CREAT SERPL-MCNC: 0.64 MG/DL
CREAT SPEC-SCNC: 62 MG/DL
EGFR: 89 ML/MIN/1.73M2
ESTIMATED AVERAGE GLUCOSE: 169 MG/DL
FRUCTOSAMINE SERPL-MCNC: 279 UMOL/L
GLUCOSE BLDC GLUCOMTR-MCNC: 90
GLUCOSE BS SERPL-MCNC: 125 MG/DL
GLUCOSE SERPL-MCNC: 124 MG/DL
HBA1C MFR BLD HPLC: 7.3
HBA1C MFR BLD HPLC: 7.5 %
HDLC SERPL-MCNC: 55 MG/DL
LDLC SERPL CALC-MCNC: 68 MG/DL
MICROALBUMIN 24H UR DL<=1MG/L-MCNC: 1.7 MG/DL
MICROALBUMIN/CREAT 24H UR-RTO: 27 MG/G
NONHDLC SERPL-MCNC: 86 MG/DL
POTASSIUM SERPL-SCNC: 4.8 MMOL/L
PROT SERPL-MCNC: 6.7 G/DL
SODIUM SERPL-SCNC: 141 MMOL/L
T4 FREE SERPL-MCNC: 1.4 NG/DL
TRIGL SERPL-MCNC: 87 MG/DL
TSH SERPL-ACNC: 2.43 UIU/ML

## 2022-11-03 ENCOUNTER — APPOINTMENT (OUTPATIENT)
Dept: INTERNAL MEDICINE | Facility: CLINIC | Age: 82
End: 2022-11-03

## 2022-11-03 ENCOUNTER — APPOINTMENT (OUTPATIENT)
Dept: RHEUMATOLOGY | Facility: CLINIC | Age: 82
End: 2022-11-03

## 2022-11-03 VITALS
SYSTOLIC BLOOD PRESSURE: 126 MMHG | HEIGHT: 57 IN | OXYGEN SATURATION: 97 % | BODY MASS INDEX: 29.77 KG/M2 | HEART RATE: 61 BPM | RESPIRATION RATE: 16 BRPM | TEMPERATURE: 97.2 F | DIASTOLIC BLOOD PRESSURE: 74 MMHG | WEIGHT: 138 LBS

## 2022-11-03 VITALS — SYSTOLIC BLOOD PRESSURE: 128 MMHG | DIASTOLIC BLOOD PRESSURE: 63 MMHG

## 2022-11-03 DIAGNOSIS — D12.6 BENIGN NEOPLASM OF COLON, UNSPECIFIED: ICD-10-CM

## 2022-11-03 DIAGNOSIS — R05.8 OTHER SPECIFIED COUGH: ICD-10-CM

## 2022-11-03 PROCEDURE — T1013: CPT

## 2022-11-03 PROCEDURE — 20610 DRAIN/INJ JOINT/BURSA W/O US: CPT | Mod: LT

## 2022-11-03 PROCEDURE — 99213 OFFICE O/P EST LOW 20 MIN: CPT | Mod: 25

## 2022-11-03 PROCEDURE — 99214 OFFICE O/P EST MOD 30 MIN: CPT

## 2022-11-03 PROCEDURE — T1013A: CUSTOM

## 2022-11-03 RX ORDER — LACTASE 9000 UNIT
9000 TABLET,CHEWABLE ORAL
Qty: 120 | Refills: 6 | Status: COMPLETED | COMMUNITY
Start: 2017-01-18 | End: 2022-11-03

## 2022-11-03 RX ORDER — LIDOCAINE HYDROCHLORIDE 10 MG/ML
1 INJECTION, SOLUTION INFILTRATION; PERINEURAL
Qty: 0 | Refills: 0 | Status: COMPLETED | OUTPATIENT
Start: 2022-11-03

## 2022-11-03 RX ORDER — METHYLPRED ACET/NACL,ISO-OS/PF 80 MG/ML
80 VIAL (ML) INJECTION
Qty: 1 | Refills: 0 | Status: COMPLETED | OUTPATIENT
Start: 2022-11-03

## 2022-11-03 RX ADMIN — METHYLPREDNISOLONE ACETATE 0 MG/ML: 80 INJECTION, SUSPENSION INTRA-ARTICULAR; INTRALESIONAL; INTRAMUSCULAR; SOFT TISSUE at 00:00

## 2022-11-03 RX ADMIN — LIDOCAINE HYDROCHLORIDE 0 %: 10 INJECTION, SOLUTION EPIDURAL; INFILTRATION; INTRACAUDAL; PERINEURAL at 00:00

## 2022-11-03 NOTE — ASSESSMENT
[FreeTextEntry1] :   1 colon polyps    The finding of polyps in the colon or rectum often raises questions for patients and their family. What is the significance of finding a polyp? Does this mean that I have, or will develop, colon or rectal (colorectal) cancer? Will a polyp require surgery?\par \par Some types of polyps (called adenomas) have the potential to become cancerous, while others (hyperplastic or inflammatory polyps) have virtually no chance of becoming cancerous.\par \par When discussing colon polyps, the following points should be considered:\par \par ?Polyps are common (they occur in 30 to 50 percent of adults)\par \par ?Not all polyps will become cancer\par \par ?It takes many years for a polyp to become cancerous\par \par ?Polyps can be completely and safely removed\par \par \par The best course of action when a polyp is found depends upon the number, type, size, and location of the polyp. People who have an adenoma removed will require a follow-up examination; new polyps may develop over time that need to be removed.\par \par \par COLON POLYP CAUSES\par Polyps are very common in men and women of all races who live in industrialized countries, suggesting that dietary and environmental factors play a role in their development.\par \par Lifestyle — Although the exact causes are not completely understood, lifestyle risk factors include the following:\par \par ?A high-fat diet\par \par ?A diet high in red meat\par \par ?A low-fiber diet\par \par ?Cigarette smoking\par \par ?Obesity\par \par \par On the other hand, use of aspirin and other nonsteroidal anti-inflammatory drugs and a high-calcium diet may protect against the development of colon cancer. (See "Patient education: Screening for colorectal cancer (Beyond the Basics)".)\par \par Aging — Colorectal cancer and polyps are uncommon before age 40. Ninety percent of cases occur after age 50, with men somewhat more likely to develop polyps than women; therefore, colon cancer screening is usually recommended starting at age 50 for both sexes. It takes approximately 10 years for a small polyp to develop into cancer.\par \par Family history and genetics — Polyps and colon cancer tend to run in families, suggesting that genetic factors are important in their development.\par \par Any history of colon polyps or colon cancer in the family should be discussed with a healthcare provider, particularly if cancer developed at an early age, in close relatives, or in multiple family members. As a general rule, screening for colon cancer begins at an earlier age in people with a family history of cancer or polyps.\par \par Rare genetic diseases can cause high rates of colorectal cancer relatively early in adult life. Familial adenomatous polyposis and MUTYH-associated polyposis cause multiple colon polyps. Another, hereditary nonpolyposis colon cancer or Denny syndrome, increases the risk of colon cancer, but does not cause a large number of polyps. Testing for these genes may be recommended for families with high rates of cancer, but is not generally recommended for other groups.\par \par \par TYPES OF COLON POLYPS\par The most common types of polyps are hyperplastic and adenomatous polyps. Other types of polyps can also be found in the colon, although these are far less common and are not discussed here.\par \par Hyperplastic polyps — Hyperplastic polyps are usually small, located in the end-portion of the colon (the rectum and sigmoid colon), have no potential to become malignant, and are not worrisome (figure 1). It is not always possible to distinguish a hyperplastic polyp from an adenomatous polyp based upon appearance during colonoscopy, which means that hyperplastic polyps are often removed or biopsied to allow microscopic examination.\par \par Adenomatous polyps — Two-thirds of colon polyps are adenomas. Most of these polyps do not develop into cancer, although they have the potential to become cancerous. Adenomas are classified by their size, general appearance, and their specific features as seen under the microscope.\par \par As a general rule, the larger the adenoma, the more likely it is to eventually become a cancer. As a result, large polyps (larger than 5 millimeters, approximately 3/8 inch) are usually removed completely to prevent cancer and for microscopic examination to guide follow-up testing.\par \par Malignant polyps — Polyps that contain cancerous cells are known as malignant polyps. The optimal treatment for malignant polyps depends upon the extent of the cancer (when examined with a microscope) and other individual factors. (See "Overview of colon polyps".)\par \par \par COLON POLYP DIAGNOSIS\par Polyps usually do not cause symptoms but may be detected during a colon cancer screening examination (such as flexible sigmoidoscopy or colonoscopy) (picture 1) or after a positive screening test for occult blood in the stool. \par \par Colonoscopy is the best way to evaluate the colon because it allows the clinician to see the entire lining of the colon and remove most polyps that are found (occasionally, large polyps need to be removed during a separate procedure). During colonoscopy, a clinician inserts a very thin, flexible tube with a light source and small camera into the anus. The tube is advanced through the entire length of the large intestine (colon). (See "Patient education: Colonoscopy (Beyond the Basics)".)\par \par The inside of the colon is a tube-like structure with a flat surface with curved folds. A polyp appears as a lump that protrudes into the inside of the colon (picture 1). The tissue covering a polyp may look the same as normal colon tissue, or, there may be tissue changes ranging from subtle color changes to ulceration and bleeding. Some polyps are flat ("sessile") and others extend out on a stalk ("pedunculated").\par \par Colonoscopy is the best test for the follow-up examination of polyps. Virtual colonoscopy using computed tomography technology is another test used to detect polyps.\par \par \par COLON POLYP REMOVAL\par Colorectal cancer is preventable if precancerous polyps (ie, adenomas) are detected and removed before they become malignant (cancerous). Over time, small polyps can change their structure and become cancerous. Polyps are usually removed when they are found on colonoscopy, which eliminates the chance for that polyp to become cancerous.\par \par Procedure — The medical term for removing polyps is polypectomy. Most polypectomies can be performed through a colonoscope. Small polyps can be removed with an instrument that is inserted through the colonoscope and snips off small pieces of tissue. Larger polyps are usually removed by placing a noose, or snare, around the polyp base and burning through it with electric cautery (figure 2). The cautery also helps to stop bleeding after the polyp is removed.\par \par Polyp removal is not painful because the lining of the colon does not have the ability to feel pain. In addition, a sedative medication is given before the colonoscopy to prevent pain caused by stretching of the colon. Rarely, a polyp will be too large to remove during colonoscopy, which means that a surgical procedure will be needed at a later time.\par \par Complications — Polypectomy is safe although it has a few potential risks and complications. The most common complications are bleeding and perforation (creating a hole in the colon). Fortunately, this occurs infrequently (one in 1000 patients having colonoscopy). Bleeding can usually be controlled during colonoscopy by cauterizing (applying heat) to the bleeding site; surgery is sometimes required for perforation.\par \par Medication use — Nonsteroidal anti-inflammatory drugs including aspirin, ibuprofen (sample brand names: Advil, Motrin), and naproxen (sample brand name: Aleve) can usually be continued before your colonoscopy. Acetaminophen (sample brand name: Tylenol) is safe to take. People who require anti-clotting medications such as warfarin (sample brand name: Coumadin) should discuss how and when to stop and resume this medication with their clinician.\par \par \par COLON CANCER PREVENTION\par \par Follow-up colonoscopy — Patients should discuss the results of the tissue analysis when they are available, within a few weeks after the procedure, to decide if and when a follow-up examination is needed.\par She had tubular adenoma in past and will need repeat in 2023   her last  was negative   \par 2 celiac   well controlled on diet   continue  gluten free diet \par 3. dyspepsia  controlled on famotidine  discussed Rule of 2's; pt should avoid eating too much; too fast; too spicy; too lousy; less than two hours before bed \par -Things to avoid including overeating, spicy foods, tight clothing, eating within three hours of bed, this list is not all inclusive. \par -For treatment of reflux, possible options discussed including diet control, H2 blockers, PPIs, as well as coating motility agents discussed as treatment options. Timing of meals and proximity of last meal to sleep were discussed. If symptoms persist, a formal gastrointestinal evaluation is needed. \par \par 4 lasctoseintolerance-   Lactose intolerance is a condition that makes it hard for your body to digest milk and foods made with milk (called dairy products). If you have lactose intolerance and you eat dairy products, you can get diarrhea, belly pain, and gas.\par Lactose intolerance can affect anyone. But it is most common among , , and black people.\par In people who do not have lactose intolerance, the body makes a protein called an "enzyme" that breaks down lactose, the main form of sugar found in milk. In people who do have lactose intolerance, the body either does not make enough of the enzyme, or the enzyme does not work as well as it should. Also, some infections, such as you might get with food poisoning, can damage the enzyme. But if that happens, the problem usually goes away within a few weeks. Luckily, people with lactose intolerance can take an enzyme supplement to help with their problem.\par What are the symptoms of lactose intolerance?The symptoms happen only after you eat dairy foods. They can include:\par ?Cramps or belly pain (usually around or below the belly button)\par ?Bloating (feeling like your belly is full of air)\par ?Gas\par ?Diarrhea (often it is bulky, foamy, and watery)\par ?Vomiting (this happens mostly in teens)\par Is there a test for lactose intolerance?Yes, there are 2 ways to test for lactose intolerance. One is a breathing test, and one is a blood test. The breathing test is more common.\par Your doctor or nurse will tell you how to prepare for your test. You will not be able to eat or drink anything for several hours before the test. Plus, you might have to change your medicines or stop smoking for a while before the test.\par ?Lactose hydrogen breath test – For this test, you drink a liquid that has lactose in it. Then you breathe into a special machine every 30 minutes. The machine measures how much hydrogen you breathe out. People who have lactose intolerance breathe out more hydrogen than normal.\par ?Lactose tolerance test – For this test, you drink a liquid that has lactose in it. The doctor or nurse will take blood samples from you when the test starts, and again 1 and 2 hours later. If your blood has low levels of sugar after you drink the lactose, it means you probably have lactose intolerance.\par  If you think you might have lactose intolerance, tell your doctor or nurse. He or she can ask you questions to make sure that there are no other problems.\par How is lactose intolerance treated?Treatment differs depending on how severe the problem is. But in general, treatment can include:\par ?Eating less dairy food\par ?Finding non-dairy sources of nutrients (such as calcium and vitamin D) and protein\par ?Taking an enzyme supplement that will help you break down dairy foods\par How do I reduce the amount of dairy foods I eat?You can start by cutting down but not stopping foods you know contain dairy. Dairy foods should be consumed with meals. Dairy foods include milk, cream, ice cream, yogurt, cheese, and butter. This table shows how much lactose is in some common dairy foods .\par Your doctor or nurse might suggest that you talk to a nutritionist to learn which foods have lactose. The nutritionist can also make sure that you get enough calcium and vitamin D in your diet.\par If you are really sensitive to dairy foods or lactose, you will also need to read the labels on everything you eat. Milk or lactose is sometimes added to foods you might not suspect, such as cereal, instant soups, and salad dressings. Check the ingredient list of foods for anything that might suggest lactose. Look for these words:\par ?Milk, "milk byproducts," "dry milk powder," and "dry milk solids"\par ?Lactose\par ?Whey (whey is milk that has gone sour)\par Although some medicines are made with lactose, most people who are lactose intolerant can handle the very small amount in medicines.\par Which enzyme supplement should I use?There are many enzyme supplements to choose from, including Lactaid (tablets or liquid), Lactrase, LactAce, Dairy Ease, and Lactrol. You should take the supplement right before you start eating. If you forget, you can take it during the meal, but it might not work as well.\par The important thing to know is that each product works a bit differently for each person. Plus, none of them can break down every last bit of lactose, so some people still have symptoms even with an enzyme supplement.\par Should I take calcium or vitamin D supplements?That depends on whether you completely avoid dairy foods. If you do, your doctor or nurse might recommend calcium supplements. He or she might also check your vitamin D levels to decide whether you should take supplements.\par Is lactose intolerance basically a food allergy?No. There are people who are allergic to milk and dairy foods. But the symptoms of a dairy allergy are often different from those of lactose intolerance. In the case of an allergy, the body reacts to the protein in milk, rather than to the sugar. Plus, allergies involve the body's infection-fighting system, called the immune system. Lactose intolerance does not.\par \par

## 2022-11-03 NOTE — HISTORY OF PRESENT ILLNESS
[FreeTextEntry1] : Patient reports tolerating hydroxychloroquine with alternate day dosing daily.    Blood  testing with hepatic and renal function within range .  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  She refrains from NSAIDs tx and takes intermittent Acetaminophen tx.  \par B/l medial epicondylitis has improved since last encounter however she notes pain over the left knee over the last few weeks most notable upon extended walking or climbing stairs.  She denies accompanied swelling or paresthesias.  She further denies trauma.  \par Bone density reflects osteoporosis in the right femoral head at a T score of -2.5. Patient continues on calcium and vitamin D twice daily along with bisphosphate therapy weekly. She otherwise denies recent falls, fractures or dentition loss.  \par She further denies new systemic symptoms or symptoms of Raynaud's, rash or photosensitivity.

## 2022-11-03 NOTE — REASON FOR VISIT
[Follow-Up: _____] : a [unfilled] follow-up visit [[employer] ] : provided by [employer]   [Time Spent: ____ minutes] : Total time spent using  services: [unfilled] minutes. The patient's primary language is not English thus required  services. [Interpreters_IDNumber] : [id number] [Interpreters_FullName] : [place]

## 2022-11-03 NOTE — PHYSICAL EXAM
[General Appearance - Alert] : alert [General Appearance - In No Acute Distress] : in no acute distress [Sclera] : the sclera and conjunctiva were normal [Nail Clubbing] : no clubbing  or cyanosis of the fingernails [FreeTextEntry1] : No active synovitis; epicondyles without tenderness ,shoulder abduction maintained on the left with pain on internal rotation with tenderness at over the left subacromial bursa; there is tenderness appreciated over the lateral joint line of the left knee with minimal swelling or  erythema [] : no rash [Skin Lesions] : no skin lesions [Motor Exam] : the motor exam was normal [Oriented To Time, Place, And Person] : oriented to person, place, and time [Impaired Insight] : insight and judgment were intact

## 2022-11-03 NOTE — PROCEDURE
[Today's Date:] : Date: [unfilled] [Patient] : the patient [Risks] : risks [Benefits] : benefits [Consent Obtained] : written consent was obtained prior to the procedure and is detailed in the patient's record [Therapeutic] : therapeutic [#1 Site: ______] : #1 site identified in the [unfilled] [Betadine] : betadine solution [25 gauge 1 inch] : A 25 gauge 1 inch needle was used [___ml 1% Lidocaine] : [unfilled] ml of 1% lidocaine [Depomedrol ___ mg] : Depomedrol [unfilled] mg [Tolerated Well] : the patient tolerated the procedure well [No Complications] : there were no complications [Patient Instructed to Call] : patient was instructed to call if redness at site, a decrease in range of motion or an increase in pain is noted after procedure.

## 2022-11-03 NOTE — REASON FOR VISIT
[Follow-up] : a follow-up of an existing diagnosis [[employer] ] : provided by [employer]   [Time Spent: ____ minutes] : Total time spent using  services: [unfilled] minutes. The patient's primary language is not English thus required  services. [Interpreters_IDNumber] : [id number] [Interpreters_FullName] : Steven [TWNoteComboBox1] : Serbian

## 2022-11-03 NOTE — ASSESSMENT
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   She understands the importance of yearly Opthalmology screens for visual screen testing to assess for retinal toxicity.  She understands the importance of sun avoidance and the application of SPF protection as well as the use of wide brim hats. Emphasized importance of health screening in the setting of ds DNA Abs in this age group. Renal mass detected on CT abd followed by Renal.  \par \par PT continued for b/l shoulders.  Topical anti-inflammatory given for pain relief. 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  \par Patient will benefit from physical therapy to help with joint mobility and muscle strengthening.  Quadriceps strengthening exercises demonstrated in the office.  Weight loss has been encouraged to reduce load over the medial joint line.  IAC given to the left knee for pain relief .  Viscosupplementation has been encouraged to provide additional lubrication and joint support.  \par \par Patient will continue with lifestyle and exercise modification to address fatty liver and cardiovascular health. \par She will continue on Calcium and VitD at the recommended doses of 1200mg and 800IU daily, respectively.  We reviewed calcium and VitD enriched foods as well. Recommend  bisphosphonate therapy weekly.\par \par \par She is in agreement with the above plan and will return in three month' time.\par  \par

## 2022-11-03 NOTE — PHYSICAL EXAM
[Sclera] : the sclera and conjunctiva were normal [Normal Lips/Gums] : the lips and gums were normal [Normal Appearance] : the appearance of the neck was normal [Heart Rate And Rhythm] : heart rate was normal and rhythm regular [Normal S1, S2] : normal S1 and S2 [Heart Sounds Gallop] : no gallops [No Rubs] : no pericardial rub [+2] : edema: +2 [Full ROM] : with full range of motion [Diminished Throughout Both Feet] : normal tactile sensation with monofilament testing throughout both feet [Soft, Nontender] : the abdomen was soft and nontender [No Mass] : no masses were palpated [No HSM] : no hepatosplenomegaly noted [None] : no CVA tenderness [Cervical Lymph Nodes Enlarged Posterior Bilaterally] : no posterior cervical lymphadenopathy [Cervical Lymph Nodes Enlarged Anterior Bilaterally] : no anterior cervical lymphadenopathy [No CVA Tenderness] : no CVA  tenderness [Abnormal Walk] : normal gait [No Clubbing, Cyanosis] : no clubbing or cyanosis of the fingernails [Involuntary Movements] : no involuntary movements were seen [No Joint Swelling] : no joint swelling seen [Motor Tone] : muscle strength and tone were normal [Normal Color / Pigmentation] : normal skin color and pigmentation [] : no rash [Normal Turgor] : normal skin turgor [No Focal Deficits] : no focal deficits [Normal] : oriented to person, place, and time

## 2022-11-10 DIAGNOSIS — E11.3299 TYPE 2 DIABETES MELLITUS WITH MILD NONPROLIFERATIVE DIABETIC RETINOPATHY WITHOUT MACULAR EDEMA, UNSPECIFIED EYE: ICD-10-CM

## 2023-01-06 ENCOUNTER — RX RENEWAL (OUTPATIENT)
Age: 83
End: 2023-01-06

## 2023-02-01 LAB
ALBUMIN SERPL ELPH-MCNC: 4.3 G/DL
ALP BLD-CCNC: 73 U/L
ALT SERPL-CCNC: 14 U/L
ANION GAP SERPL CALC-SCNC: 11 MMOL/L
AST SERPL-CCNC: 14 U/L
BILIRUB DIRECT SERPL-MCNC: 0.1 MG/DL
BILIRUB INDIRECT SERPL-MCNC: 0.4 MG/DL
BILIRUB SERPL-MCNC: 0.5 MG/DL
BUN SERPL-MCNC: 21 MG/DL
CALCIUM SERPL-MCNC: 9.9 MG/DL
CHLORIDE SERPL-SCNC: 105 MMOL/L
CHOLEST SERPL-MCNC: 155 MG/DL
CO2 SERPL-SCNC: 26 MMOL/L
CREAT SERPL-MCNC: 0.72 MG/DL
CREAT SPEC-SCNC: 82 MG/DL
EGFR: 84 ML/MIN/1.73M2
ESTIMATED AVERAGE GLUCOSE: 183 MG/DL
FRUCTOSAMINE SERPL-MCNC: 284 UMOL/L
GLUCOSE BS SERPL-MCNC: 121 MG/DL
GLUCOSE SERPL-MCNC: 126 MG/DL
HBA1C MFR BLD HPLC: 8 %
HDLC SERPL-MCNC: 54 MG/DL
LDLC SERPL CALC-MCNC: 81 MG/DL
MICROALBUMIN 24H UR DL<=1MG/L-MCNC: <1.2 MG/DL
MICROALBUMIN/CREAT 24H UR-RTO: NORMAL MG/G
NONHDLC SERPL-MCNC: 101 MG/DL
POTASSIUM SERPL-SCNC: 4.7 MMOL/L
PROT SERPL-MCNC: 6.9 G/DL
SODIUM SERPL-SCNC: 141 MMOL/L
T4 FREE SERPL-MCNC: 1.4 NG/DL
TRIGL SERPL-MCNC: 101 MG/DL
TSH SERPL-ACNC: 1.61 UIU/ML

## 2023-02-06 ENCOUNTER — APPOINTMENT (OUTPATIENT)
Dept: ENDOCRINOLOGY | Facility: CLINIC | Age: 83
End: 2023-02-06
Payer: MEDICAID

## 2023-02-06 VITALS
SYSTOLIC BLOOD PRESSURE: 123 MMHG | RESPIRATION RATE: 16 BRPM | OXYGEN SATURATION: 97 % | DIASTOLIC BLOOD PRESSURE: 77 MMHG | TEMPERATURE: 98.1 F | HEIGHT: 57 IN | WEIGHT: 136 LBS | BODY MASS INDEX: 29.34 KG/M2 | HEART RATE: 83 BPM

## 2023-02-06 LAB — GLUCOSE BLDC GLUCOMTR-MCNC: 128

## 2023-02-06 PROCEDURE — 82962 GLUCOSE BLOOD TEST: CPT

## 2023-02-06 PROCEDURE — 99214 OFFICE O/P EST MOD 30 MIN: CPT

## 2023-02-06 NOTE — ASSESSMENT
[FreeTextEntry1] : The diabetic control has deteriorated\par her renal function is fine\par She has lost a little weight\par She was on vacation\par Will add Tradjenta 5 mg po QD\par She refuses a GLP-1 agonist injection\par She will let me know if the insurance does not pay the medication

## 2023-02-06 NOTE — HISTORY OF PRESENT ILLNESS
[FreeTextEntry1] : Patient feels well , she is asymptomatic. Her weight has decreased. She is walking regularly and following the diet. Her blood glucose at home are well controlled, they are usually around 120 mg/dl. She denies low blood glucose during the night. She denies chest pain, or SOB. Denies numbness, tingling or burning sensation on her extremities. Taking her medications regularly. She has seen the Ophthalmologist recently, no diabetic retinopathy. She has not seen Podiatrist recently. She has not seen the Cardiologist recently.\par

## 2023-02-06 NOTE — DATA REVIEWED
[FreeTextEntry1] : The FBS is fine but the HbA1c has increased. Her renal function is fine. No microalbuminuria. The lipid panel was fine.

## 2023-02-16 ENCOUNTER — APPOINTMENT (OUTPATIENT)
Dept: RHEUMATOLOGY | Facility: CLINIC | Age: 83
End: 2023-02-16
Payer: MEDICAID

## 2023-02-16 VITALS
HEIGHT: 57 IN | SYSTOLIC BLOOD PRESSURE: 122 MMHG | DIASTOLIC BLOOD PRESSURE: 68 MMHG | TEMPERATURE: 98.2 F | OXYGEN SATURATION: 93 % | HEART RATE: 80 BPM | WEIGHT: 137 LBS | RESPIRATION RATE: 16 BRPM | BODY MASS INDEX: 29.56 KG/M2

## 2023-02-16 DIAGNOSIS — M54.12 RADICULOPATHY, CERVICAL REGION: ICD-10-CM

## 2023-02-16 DIAGNOSIS — M75.50 BURSITIS OF UNSPECIFIED SHOULDER: ICD-10-CM

## 2023-02-16 DIAGNOSIS — M25.512 PAIN IN LEFT SHOULDER: ICD-10-CM

## 2023-02-16 PROCEDURE — T1013A: CUSTOM

## 2023-02-16 PROCEDURE — 99214 OFFICE O/P EST MOD 30 MIN: CPT | Mod: 25

## 2023-02-16 PROCEDURE — 20611 DRAIN/INJ JOINT/BURSA W/US: CPT | Mod: LT

## 2023-02-16 RX ORDER — METHYLPRED ACET/NACL,ISO-OS/PF 40 MG/ML
40 VIAL (ML) INJECTION
Qty: 1 | Refills: 0 | Status: COMPLETED | OUTPATIENT
Start: 2023-02-16

## 2023-02-16 RX ORDER — LIDOCAINE HYDROCHLORIDE 10 MG/ML
1 INJECTION, SOLUTION INFILTRATION; PERINEURAL
Qty: 0 | Refills: 0 | Status: COMPLETED | OUTPATIENT
Start: 2023-02-16

## 2023-02-16 RX ADMIN — Medication 0 %: at 00:00

## 2023-02-16 RX ADMIN — METHYLPREDNISOLONE ACETATE 0 MG/ML: 40 INJECTION, SUSPENSION INTRA-ARTICULAR; INTRALESIONAL; INTRAMUSCULAR; SOFT TISSUE at 00:00

## 2023-02-16 RX ADMIN — Medication 0 MG/ML: at 00:00

## 2023-02-16 RX ADMIN — LIDOCAINE HYDROCHLORIDE 0 %: 10 INJECTION, SOLUTION EPIDURAL; INFILTRATION; INTRACAUDAL; PERINEURAL at 00:00

## 2023-02-16 NOTE — PROCEDURE
[Today's Date:] : Date: [unfilled] [Patient] : the patient [Risks] : risks [Benefits] : benefits [Consent Obtained] : written consent was obtained prior to the procedure and is detailed in the patient's record [Therapeutic] : therapeutic [#1 Site: ______] : #1 site identified in the [unfilled] [Betadine] : betadine solution [___ml 1% Lidocaine] : [unfilled] ml of 1% lidocaine [Depomedrol ___ mg] : Depomedrol [unfilled] mg [Tolerated Well] : the patient tolerated the procedure well [No Complications] : there were no complications [Patient Instructed to Call] : patient was instructed to call if redness at site, a decrease in range of motion or an increase in pain is noted after procedure. [25 gauge 1.5 inch] : A 25 gauge 1.5 inch needle was used [FreeTextEntry1] : Study Date: 2/16/2022\par Study Type: Guidance of needle placement \par Indication: Pain in LT subacromial bursa ; failure from prior palpation guided cortisone injection \par Study Site: LT SAB\par Equipment: [employer] e 4-12MHz linear transducer  \par \par Findings: The use of a [employer] e 12 MHz linear transducer with live ultrasound guidance of the shoulder was necessary given the patient's local body habitus overlying and obscuring the accurate identification of normal body bony anatomy used to identify the injection site and the depth of soft tissue space as well as prior failure to tx with palpation guided cortisone injection;\par the location of the needle tip and intra-articular delivery of the medication while avoiding neurovascular structures confirmed. Orthogonal views of the subacromial bursa was taken.  After prepping the lateral shoulder with betadine, a 25 x1.5guage needle was advanced to the subacromial bursa under direct US visualization using in-plane technique with 40mg of methylprednisolone and 1% lidocaine injected.\par \par Impressions: Successful injection of the LT SAB using US guidance.\par

## 2023-02-16 NOTE — REASON FOR VISIT
[Follow-Up: _____] : a [unfilled] follow-up visit [[employer] ] : provided by [employer]   [Time Spent: ____ minutes] : Total time spent using  services: [unfilled] minutes. The patient's primary language is not English thus required  services. [Interpreters_IDNumber] : [id number] [Interpreters_FullName] : Sonja

## 2023-02-16 NOTE — ASSESSMENT
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   She understands the importance of yearly Opthalmology screens for visual screen testing to assess for retinal toxicity.  She understands the importance of sun avoidance and the application of SPF protection as well as the use of wide brim hats. Emphasized importance of health screening in the setting of ds DNA Abs in this age group as well as discussed in detail the importance of lowering hemoglobin A1c.\par \par PT continued for b/l shoulders.  Topical anti-inflammatory given for pain relief. 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  \par Patient will benefit from physical therapy to help with joint mobility and muscle strengthening.  Quadriceps strengthening exercises demonstrated in the office.  Weight loss has been encouraged to reduce load over the medial joint line.   Viscosupplementation has been encouraged to provide additional lubrication and joint support.  \par \par Patient will continue with lifestyle and exercise modification to address fatty liver and cardiovascular health. \par She will continue on Calcium and VitD at the recommended doses of 1200mg and 800IU daily, respectively.  We reviewed calcium and VitD enriched foods as well. Recommend  bisphosphonate therapy weekly.\par \par \par She is in agreement with the above plan and will return in three month' time.\par  \par

## 2023-02-16 NOTE — PHYSICAL EXAM
[General Appearance - Alert] : alert [General Appearance - In No Acute Distress] : in no acute distress [Sclera] : the sclera and conjunctiva were normal [Nail Clubbing] : no clubbing  or cyanosis of the fingernails [] : no rash [Skin Lesions] : no skin lesions [Motor Exam] : the motor exam was normal [Oriented To Time, Place, And Person] : oriented to person, place, and time [Impaired Insight] : insight and judgment were intact [FreeTextEntry1] : No active synovitis; epicondyles without tenderness ,shoulder abduction maintained on the left with pain on internal rotation with tenderness at over the left subacromial bursa; minimal tenderness appreciated over the lateral joint line of the left knee with minimal swelling or  erythema

## 2023-02-16 NOTE — HISTORY OF PRESENT ILLNESS
[FreeTextEntry1] : Patient reports tolerating hydroxychloroquine with alternate day dosing daily.    Blood  testing with hepatic and renal function within range though with bump in hemoglobin A1c to 8%.  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  She refrains from NSAIDs tx and takes intermittent Acetaminophen tx.  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  \par She denies accompanied swelling or paresthesias.  She further denies trauma.  \par Bone density reflects osteoporosis in the right femoral head at a T score of -2.5. Patient continues on calcium and vitamin D twice daily along with bisphosphate therapy weekly. She otherwise denies recent falls, fractures or dentition loss.  \par She further denies new systemic symptoms or symptoms of Raynaud's, rash or photosensitivity.

## 2023-03-06 ENCOUNTER — APPOINTMENT (OUTPATIENT)
Dept: INTERNAL MEDICINE | Facility: CLINIC | Age: 83
End: 2023-03-06
Payer: MEDICAID

## 2023-03-06 VITALS
HEART RATE: 86 BPM | HEIGHT: 57 IN | OXYGEN SATURATION: 98 % | WEIGHT: 136 LBS | DIASTOLIC BLOOD PRESSURE: 67 MMHG | TEMPERATURE: 96.4 F | SYSTOLIC BLOOD PRESSURE: 112 MMHG | BODY MASS INDEX: 29.34 KG/M2

## 2023-03-06 DIAGNOSIS — K21.9 GASTRO-ESOPHAGEAL REFLUX DISEASE W/OUT ESOPHAGITIS: ICD-10-CM

## 2023-03-06 PROCEDURE — 99214 OFFICE O/P EST MOD 30 MIN: CPT | Mod: 25

## 2023-03-06 PROCEDURE — T1013A: CUSTOM

## 2023-03-06 RX ORDER — AZELASTINE HYDROCHLORIDE 137 UG/1
0.1 SPRAY, METERED NASAL TWICE DAILY
Qty: 1 | Refills: 1 | Status: COMPLETED | COMMUNITY
Start: 2022-11-03 | End: 2023-03-06

## 2023-03-06 NOTE — PHYSICAL EXAM
[Sclera] : the sclera and conjunctiva were normal [Normal Lips/Gums] : the lips and gums were normal [Oropharynx] : the oropharynx was normal [Heart Rate And Rhythm] : heart rate was normal and rhythm regular [Normal S1, S2] : normal S1 and S2 [Normal PMI] : the apical impulse was abnormal [None] : no edema [Soft, Nontender] : the abdomen was soft and nontender [No Mass] : no masses were palpated [No HSM] : no hepatosplenomegaly noted [Cervical Lymph Nodes Enlarged Posterior Bilaterally] : no posterior cervical lymphadenopathy [Cervical Lymph Nodes Enlarged Anterior Bilaterally] : no anterior cervical lymphadenopathy [No CVA Tenderness] : no CVA  tenderness [Abnormal Walk] : normal gait [No Clubbing, Cyanosis] : no clubbing or cyanosis of the fingernails [Involuntary Movements] : no involuntary movements were seen [No Joint Swelling] : no joint swelling seen [Motor Tone] : muscle strength and tone were normal [Normal Color / Pigmentation] : normal skin color and pigmentation [] : no rash [Normal Turgor] : normal skin turgor [No Focal Deficits] : no focal deficits [Motor Exam] : the motor exam was normal [Normal] : oriented to person, place, and time

## 2023-03-06 NOTE — REASON FOR VISIT
[Follow-up] : a follow-up of an existing diagnosis [[employer] ] : provided by [employer]   [Time Spent: ____ minutes] : Total time spent using  services: [unfilled] minutes. The patient's primary language is not English thus required  services. [Interpreters_IDNumber] : [id number] [Interpreters_FullName] : Debbie [TWNoteComboBox1] : Senegalese

## 2023-03-06 NOTE — ASSESSMENT
[FreeTextEntry1] : 1 celiac she is trying to adhere to gluten free diet and will fu  her labs today \par  gluten-free diet is a diet that doesn't contain any gluten. Gluten is a protein that is found in wheat, rye, barley, and (sometimes) oats. Many foods, such as breads, pasta, pizza, cereals, and crackers, have gluten in them. People who are on a gluten-free diet should not eat any foods with gluten.\par \par \par Who should be on a gluten-free diet?\par People with a condition called celiac disease should be on a gluten-free diet. Celiac disease is a condition that affects the body's ability to break down certain foods. People with celiac disease get sick if they eat foods with gluten. They need to be on a strict gluten-free diet for their whole life.\par \par If you think you have celiac disease, don't start a gluten-free diet until after you are tested for the disease. That's because what you eat can affect your test results.\par \par More and more, though, people without celiac disease are eating a gluten-free diet. They might have heard that this diet can help them lose weight or feel better. It's true that a gluten-free diet can be healthy. But it also sometimes keeps people from getting all the nutrition they need. Also, some gluten-free products have a lot of sugar and calories. This can make it harder for some people to stay at a healthy weight. If you are thinking about being on a gluten-free diet, ask your doctor or nurse if it's a good choice for you.\par \par \par How do I get started?\par To get started, you will work with a dietitian (food expert) or other professional who has experience with a gluten-free diet. They will:\par \par ?Teach you which foods are fine to eat and which foods you should avoid\par \par ?Help you plan balanced meals so that you get the nutrition you need\par \par ?Give you gluten-free recipes\par \par ?Help you find gluten-free substitutes for your favorite foods (such as pasta or cookies)\par \par \par You can get advice and help from other people, too. Ask your doctor or nurse if there is a local support group for people with celiac disease. Or you can look at websites, such as www.celiac.org, www.nationalceliac.org, and www.gikids.org.\par \par It can be hard to learn how to manage a gluten-free diet, especially at first. But it usually gets easier with practice and over time.\par \par \par Which foods can I eat?\par Foods that are gluten free and fine to eat include the following (table 1):\par \par ?Rice, corn, potatoes, quinoa, millet, buckwheat, and soybeans\par \par ?Special flours, pasta, and other products made from these foods and labeled "gluten free"\par \par ?Fruits and vegetables\par \par ?Meat and eggs\par \par ?Wine and distilled alcoholic drinks, such as rum, tequila, vodka, and whiskey\par \par \par Milk, cheese, and other dairy foods are also gluten free. But many people with celiac disease have trouble digesting these foods, especially at first. Doctors usually recommend that people with celiac disease avoid eating dairy products, at least for a short time, while their intestines are healing.\par \par \par Which foods should I not eat?\par You need to avoid all foods made from or with wheat, rye, and barley (table 2). Ask your doctor or dietitian if you can eat oats.\par \par Many types of foods contain or might contain gluten, such as:\par \par ?Flour, breads, crackers, muffins, and baking mixes\par \par ?Pasta, pastries, and cereals\par \par ?Some sauces, spreads, spices, condiments, and salad dressings\par \par ?Processed meats and meat substitutes (like vegetarian burgers)\par \par ?Beers, ales, lagers, and malt vinegars\par \par \par To know exactly which foods you can eat, you will have to read ingredient labels. Foods that are labeled "gluten free" or say they are made or processed in a "gluten-free facility" are fine to eat. Foods that contain wheat are not fine to eat. If you are unsure whether a food is gluten free, call the company. Their phone number should be on the package.\par \par Some medicines (both prescription and over-the-counter) and vitamin supplements contain a small amount of gluten. But you can still take most types of pills if you have celiac disease. Check with your doctor or nurse if you are not sure.\par \par \par Will I need to take vitamins?\par You might. Celiac disease can keep your digestive system from normally absorbing the nutrients in foods. To get all the nutrients you need, your doctor or nurse might recommend that you take a daily vitamin.\par \par \par Can I eat out?\par Yes. Many restaurants now have gluten-free menus or foods. But always let the restaurant know you can't have gluten. That way, they can be extra careful when they cook your food.\par 2  lactose intolerance  continue lactaid  \par \par 3  pancreatic cysts    mrcp  done  in 2/22 EXAM:  MR MRCP [facility] IC\par \par PROCEDURE DATE:  02/01/2022\par \par \par \par INTERPRETATION:  CLINICAL INFORMATION: Dilated pancreatic duct and pancreatic atrophy\par \par COMPARISON: Abdominal CT dated 3/8/2015.\par \par CONTRAST/COMPLICATIONS:\par IV Contrast: Gadavist  7 cc administered   0.5 cc discarded\par Oral Contrast: NONE\par Complications: None reported at time of study completion\par \par PROCEDURE:\par MRI of the abdomen was performed.\par MRCP was performed.\par \par FINDINGS:\par LOWER CHEST: Within normal limits.\par \par LIVER: Hepatic steatosis.\par BILE DUCTS: Normal caliber. Common bile duct measures 5 mm in caliber which is within normal limits. No evidence for choledocholithiasis. There is low medial insertion of the cystic duct.\par GALLBLADDER: No evidence for gallstones, thickened gallbladder wall or pericholecystic fluid. Small layering sludge in the gallbladder.\par SPLEEN: Within normal limits.\par PANCREAS: The previous abdominal CT demonstrated multiple calcifications in the pancreas, compatible with chronic pancreatitis. The main pancreatic duct in the body and tail is dilated, grossly unchanged. Apparent cluster of filling defects in the main pancreatic duct of the pancreatic neck, corresponding to a 1.6 x 0.8 cm calcific structure on the CT, likely representing stones. The pancreatic body and tail are atrophic. A few small nonspecific cystic lesions are seen in the pancreatic head with the largest measuring up to 0.7 cm (image 24 series 4).\par ADRENALS: Within normal limits.\par KIDNEYS/URETERS: 5 mm angiomyolipoma in the right kidney. Small cyst in the right kidney. The left kidney appears unremarkable.\par \par VISUALIZED PORTIONS:\par BOWEL: Colonic diverticulosis.\par PERITONEUM: No ascites.\par VESSELS: Within normal limits.\par RETROPERITONEUM/LYMPH NODES: No lymphadenopathy.\par ABDOMINAL WALL: Within normal limits.\par BONES: Within normal limits.\par \par IMPRESSION:\par The previous abdominal CT demonstrated multiple calcifications in the pancreas, compatible with chronic pancreatitis. The main pancreatic duct in the body and tail is dilated, grossly unchanged. Apparent cluster of filling defects in the main pancreatic duct of the pancreatic neck, corresponding to a 1.6 x 0.8 cm calcific structure on the CT, likely representing stones. The pancreatic body and tail are atrophic.\par \par A few small nonspecific cystic lesions in the pancreatic head with the largest measuring up to 0.7 cm. These may be followed with abdominal MR without and with IV contrast in 12 months to ensure stability.\par \par Hepatic steatosis.\par 4  fatty liver  Fatty Liver: The patient denies any jaundice or pruritus. The patient denies any alcohol use. The patient denies taking large doses of nonsteroidal anti-inflammatory drugs or acetaminophen. The findings are suggestive of fatty liver. The patient and I had a long discussion regarding the risks of fatty liver progressing to cirrhosis. The patient was told of the possible increased risk of developing liver failure, cirrhosis, ascites, GI bleeding secondary to varices, hepatic encephalopathy, bleeding tendencies and liver cancer. The patient was told of the importance of follow-up. The patient was advised to follow up every 6 months for blood work and imaging studies. The patient agreed and will follow up. The patient was advised to lose weight. I recommend a trial of vitamin E supplementation for the fatty liver. If the liver enzymes remain elevated, the patient may require a trial of Pioglitazone for the fatty liver. I recommend avoid alcohol and hepato-toxic agents. The patient was also advised to avoid NSAIDs, Acetaminophen and any other hepatotoxic drugs. The patient was also advised not to share needles, razors, scissors, nail clippers, etc.. The patient is to continue close follow-up in our office for blood work and exams. If the liver enzymes remain elevated, the patient may require a CT guided liver biopsy to assess the liver parenchyma and for possible treatment. We had a long discussion regarding the risks and benefits of the procedure. The patient was told of the risks of bleeding, perforation, infections, emergency surgery and missing lesions. The patient agreed and will follow-up to reassess the symptoms Patient has been counseled on life style interventions, including but not limited to: weight loss (3-5% loss of body weight might improve fat in the liver, while 7-10% needed for potential improvement of other components, including inflammation and scarring of the liver, called fibrosis); healthy diet, avoiding added sugars, sodas, avoiding saturated fats, limiting sodium, avoiding alcohol; and on the importance of regular exercise (> 150 min/week moderate intensity aerobic exercise with at least 2x/week muscle strengthening or exercise as tolerated). \par - I explained to patient the natural Hx of NAFLD. \par \par 5  dyspepsia  improved  discussed Rule of 2's; pt should avoid eating too much; too fast; too spicy; too lousy; less than two hours before bed \par -Things to avoid including overeating, spicy foods, tight clothing, eating within three hours of bed, this list is not all inclusive. \par -For treatment of reflux, possible options discussed including diet control, H2 blockers, PPIs, as well as coating motility agents discussed as treatment options. Timing of meals and proximity of last meal to sleep were discussed. If symptoms persist, a formal gastrointestinal evaluation is needed. \par \par 6  gassiness   Most foods that contain carbohydrates can cause gas. By contrast, fats and proteins cause little gas (although certain proteins may intensify the odor of gas).\par \par Sugars\par \par The sugars that cause gas are raffinose, lactose, fructose, and sorbitol.\par •Raffinose — Beans contain large amounts of this complex sugar. Smaller amounts are found in cabbage, [place] sprouts, broccoli, asparagus, other vegetables, and whole grains.\par •Lactose — Lactose is the natural sugar in milk. It is also found in milk products, such as cheese and ice cream, and processed foods, such as bread, cereal, and salad dressing. Many people, particularly those of , , or  background, have low levels of the enzyme lactase needed to digest lactose. Also, as people age, their enzyme levels decrease. As a result, over time people may experience increasing amounts of gas after eating food containing lactose.\par •Fructose — Fructose is naturally present in onions, artichokes, pears, and wheat. It is also used as a sweetener in some soft drinks and fruit drinks.\par •Sorbitol — Sorbitol is a sugar found naturally in fruits, including apples, pears, peaches, and prunes. It is also used as an artificial sweetener in many dietetic foods and sugarfree candies and gums.\par \par Starches\par \par Most starches, including potatoes, corn, noodles, and wheat, produce gas as they are broken down in the large intestine. Rice is the only starch that does not cause gas.\par \par Fiber\par \par Dietary fiber is carbohydrate that is indigestible in the small intestine and reaches the colon relatively intact. In the colon, certain bacteria digest fiber (fermentation), which produces gas. Dietary fiber can be classified as either soluble or insoluble.\par \par Soluble fiber dissolves in water and becomes a soft gel. It is found in oat bran, beans, barley, nuts, seeds, lentils, peas, and most fruits. Insoluble fiber does not dissolve or gel in water. It absorbs liquid and adds bulk to stool. Cellulose (found in legumes, seeds, root vegetables, and vegetables in the cabbage family), wheat bran, and corn bran are examples of insoluble fiber.\par \par High fiber substances containing both soluble and insoluble fibers have the properties of both. They include oat bran, psyllium, and soy fiber. Methylcellulose is a semi-synthetic fiber. It is soluble and gel forming, but not fermentable.\par \par Types of fiber differ in the speed and extent to which they are digested in the GI tract, and in the process of fermentation. The solubility and fermentation of a particular fiber affects how it is handled in the GI tract. However, the effect of identical fibers varies from person to person.\par \par A gradual increase in dietary fiber can modify and improve symptoms. But individual responses vary and too much of a type of fiber can worsen symptoms. It may be necessary to try different types of fiber. With any dietary fiber it is best to start low and go slow.\par

## 2023-03-07 LAB
AMYLASE/CREAT SERPL: 78 U/L
ANION GAP SERPL CALC-SCNC: 13 MMOL/L
BUN SERPL-MCNC: 23 MG/DL
CALCIUM SERPL-MCNC: 10.2 MG/DL
CHLORIDE SERPL-SCNC: 100 MMOL/L
CO2 SERPL-SCNC: 28 MMOL/L
CREAT SERPL-MCNC: 0.71 MG/DL
EGFR: 86 ML/MIN/1.73M2
GLUCOSE SERPL-MCNC: 112 MG/DL
IGA SER QL IEP: 317 MG/DL
LPL SERPL-CCNC: 38 U/L
POTASSIUM SERPL-SCNC: 4.9 MMOL/L
SODIUM SERPL-SCNC: 142 MMOL/L
TTG IGA SER IA-ACNC: <1.2 U/ML
TTG IGA SER-ACNC: NEGATIVE
TTG IGG SER IA-ACNC: 5.4 U/ML
TTG IGG SER IA-ACNC: NEGATIVE

## 2023-04-14 ENCOUNTER — APPOINTMENT (OUTPATIENT)
Dept: MRI IMAGING | Facility: IMAGING CENTER | Age: 83
End: 2023-04-14
Payer: MEDICAID

## 2023-04-14 ENCOUNTER — OUTPATIENT (OUTPATIENT)
Dept: OUTPATIENT SERVICES | Facility: HOSPITAL | Age: 83
LOS: 1 days | End: 2023-04-14
Payer: MEDICAID

## 2023-04-14 DIAGNOSIS — K86.89 OTHER SPECIFIED DISEASES OF PANCREAS: ICD-10-CM

## 2023-04-14 DIAGNOSIS — K86.2 CYST OF PANCREAS: ICD-10-CM

## 2023-04-14 DIAGNOSIS — R14.0 ABDOMINAL DISTENSION (GASEOUS): ICD-10-CM

## 2023-04-14 DIAGNOSIS — Z90.710 ACQUIRED ABSENCE OF BOTH CERVIX AND UTERUS: Chronic | ICD-10-CM

## 2023-04-14 DIAGNOSIS — Z98.890 OTHER SPECIFIED POSTPROCEDURAL STATES: Chronic | ICD-10-CM

## 2023-04-14 DIAGNOSIS — K86.1 OTHER CHRONIC PANCREATITIS: ICD-10-CM

## 2023-04-14 DIAGNOSIS — Z98.41 CATARACT EXTRACTION STATUS, RIGHT EYE: Chronic | ICD-10-CM

## 2023-04-14 PROCEDURE — 74183 MRI ABD W/O CNTR FLWD CNTR: CPT | Mod: 26

## 2023-04-14 PROCEDURE — A9585: CPT

## 2023-04-14 PROCEDURE — 74183 MRI ABD W/O CNTR FLWD CNTR: CPT

## 2023-04-26 LAB
ANION GAP SERPL CALC-SCNC: 13 MMOL/L
BUN SERPL-MCNC: 25 MG/DL
CALCIUM SERPL-MCNC: 9.6 MG/DL
CHLORIDE SERPL-SCNC: 105 MMOL/L
CHOLEST SERPL-MCNC: 145 MG/DL
CO2 SERPL-SCNC: 26 MMOL/L
CREAT SERPL-MCNC: 0.71 MG/DL
CREAT SPEC-SCNC: 105 MG/DL
EGFR: 86 ML/MIN/1.73M2
FRUCTOSAMINE SERPL-MCNC: 276 UMOL/L
GLUCOSE BS SERPL-MCNC: 113 MG/DL
GLUCOSE SERPL-MCNC: 119 MG/DL
HDLC SERPL-MCNC: 60 MG/DL
LDLC SERPL CALC-MCNC: 68 MG/DL
MICROALBUMIN 24H UR DL<=1MG/L-MCNC: 1.3 MG/DL
MICROALBUMIN/CREAT 24H UR-RTO: 12 MG/G
NONHDLC SERPL-MCNC: 85 MG/DL
POTASSIUM SERPL-SCNC: 5.1 MMOL/L
SODIUM SERPL-SCNC: 143 MMOL/L
T4 FREE SERPL-MCNC: 1.3 NG/DL
TRIGL SERPL-MCNC: 85 MG/DL
TSH SERPL-ACNC: 2.24 UIU/ML

## 2023-04-29 LAB
ESTIMATED AVERAGE GLUCOSE: 160 MG/DL
HBA1C MFR BLD HPLC: 7.2 %

## 2023-05-01 ENCOUNTER — APPOINTMENT (OUTPATIENT)
Dept: ENDOCRINOLOGY | Facility: CLINIC | Age: 83
End: 2023-05-01
Payer: MEDICAID

## 2023-05-01 VITALS
DIASTOLIC BLOOD PRESSURE: 73 MMHG | OXYGEN SATURATION: 97 % | SYSTOLIC BLOOD PRESSURE: 112 MMHG | HEIGHT: 57 IN | BODY MASS INDEX: 29.12 KG/M2 | WEIGHT: 135 LBS | HEART RATE: 86 BPM | RESPIRATION RATE: 16 BRPM | TEMPERATURE: 97.7 F

## 2023-05-01 LAB — GLUCOSE BLDC GLUCOMTR-MCNC: 156

## 2023-05-01 PROCEDURE — 99214 OFFICE O/P EST MOD 30 MIN: CPT | Mod: 25

## 2023-05-01 PROCEDURE — 82962 GLUCOSE BLOOD TEST: CPT

## 2023-05-01 RX ORDER — BLOOD-GLUCOSE METER
W/DEVICE KIT MISCELLANEOUS
Qty: 1 | Refills: 0 | Status: ACTIVE | COMMUNITY
Start: 2023-05-01 | End: 1900-01-01

## 2023-05-01 NOTE — HISTORY OF PRESENT ILLNESS
[FreeTextEntry1] : Patient feels well , she is asymptomatic. Her weight is unchanged. She is exercising regularly and following the diet. She denies low blood glucose during the night. She denies chest pain, or SOB. Denies numbness, tingling or burning sensation on her extremities. Taking her medications regularly. She has seen the Ophthalmologist recently. She has seen Podiatrist recently. She has not seen the Cardiologist recently.\par

## 2023-05-01 NOTE — ASSESSMENT
[FreeTextEntry1] : Good diabetic control for her age\par Her weight is stable\par So far no renal disease\par Will continue the same treatment\par The medications were renewed

## 2023-05-01 NOTE — DATA REVIEWED
[FreeTextEntry1] : The FBS and hbA1 have improved. The renal function is fine. No microalbuminuria. The thyroid tests are normal. The lipid panel was fine.

## 2023-05-01 NOTE — PHYSICAL EXAM
[Alert] : alert [No Acute Distress] : no acute distress [No Neck Mass] : no neck mass was observed [Thyroid Not Enlarged] : the thyroid was not enlarged [No Respiratory Distress] : no respiratory distress [Clear to Auscultation] : lungs were clear to auscultation bilaterally [Normal PMI] : the apical impulse was normal [Normal Rate] : heart rate was normal [Normal] : normal [1+] : 1+ in the dorsalis pedis [Diminished Throughout Both Feet] : normal tactile sensation with monofilament testing throughout both feet

## 2023-05-18 ENCOUNTER — APPOINTMENT (OUTPATIENT)
Dept: RHEUMATOLOGY | Facility: CLINIC | Age: 83
End: 2023-05-18
Payer: MEDICAID

## 2023-05-18 VITALS
DIASTOLIC BLOOD PRESSURE: 72 MMHG | TEMPERATURE: 97 F | BODY MASS INDEX: 28.91 KG/M2 | OXYGEN SATURATION: 97 % | RESPIRATION RATE: 16 BRPM | HEART RATE: 83 BPM | SYSTOLIC BLOOD PRESSURE: 120 MMHG | HEIGHT: 57 IN | WEIGHT: 134 LBS

## 2023-05-18 DIAGNOSIS — M54.42 LUMBAGO WITH SCIATICA, LEFT SIDE: ICD-10-CM

## 2023-05-18 DIAGNOSIS — M70.62 TROCHANTERIC BURSITIS, LEFT HIP: ICD-10-CM

## 2023-05-18 DIAGNOSIS — G89.29 LUMBAGO WITH SCIATICA, LEFT SIDE: ICD-10-CM

## 2023-05-18 DIAGNOSIS — M54.41 LUMBAGO WITH SCIATICA, LEFT SIDE: ICD-10-CM

## 2023-05-18 PROCEDURE — 99213 OFFICE O/P EST LOW 20 MIN: CPT | Mod: 25

## 2023-05-18 PROCEDURE — 76882 US LMTD JT/FCL EVL NVASC XTR: CPT | Mod: LT,59

## 2023-05-18 PROCEDURE — 20610 DRAIN/INJ JOINT/BURSA W/O US: CPT | Mod: LT

## 2023-05-18 RX ORDER — CALCIUM CARBONATE/VITAMIN D3 600MG-5MCG
600-5 TABLET ORAL
Qty: 180 | Refills: 1 | Status: ACTIVE | COMMUNITY
Start: 2021-12-22 | End: 1900-01-01

## 2023-05-18 RX ADMIN — Medication 0 MG/ML: at 00:00

## 2023-05-18 RX ADMIN — Medication 0 %: at 00:00

## 2023-05-20 NOTE — H&P PST ADULT - PULMONARY EMBOLUS
Quick DC. Duplicate Request-  med pended in previous encounter, awaiting assessment    Refill Authorization Note   Trenton Lejeune  is requesting a refill authorization.  Brief Assessment and Rationale for Refill:  Quick Discontinue  Medication Therapy Plan:  Request pending    Medication Reconciliation Completed:  No      Comments:     Note composed:4:39 AM 05/20/2023               no

## 2023-05-22 PROBLEM — M54.42 CHRONIC BILATERAL LOW BACK PAIN WITH BILATERAL SCIATICA: Status: ACTIVE | Noted: 2017-04-20

## 2023-05-25 RX ORDER — LIDOCAINE HYDROCHLORIDE 10 MG/ML
1 INJECTION, SOLUTION INFILTRATION; PERINEURAL
Qty: 0 | Refills: 0 | Status: COMPLETED | OUTPATIENT
Start: 2023-05-18

## 2023-06-06 ENCOUNTER — APPOINTMENT (OUTPATIENT)
Dept: INTERNAL MEDICINE | Facility: CLINIC | Age: 83
End: 2023-06-06
Payer: MEDICAID

## 2023-06-06 VITALS
OXYGEN SATURATION: 97 % | HEIGHT: 57 IN | SYSTOLIC BLOOD PRESSURE: 107 MMHG | HEART RATE: 86 BPM | BODY MASS INDEX: 29.12 KG/M2 | DIASTOLIC BLOOD PRESSURE: 64 MMHG | TEMPERATURE: 97.6 F | WEIGHT: 135 LBS

## 2023-06-06 PROCEDURE — T1013A: CUSTOM

## 2023-06-06 PROCEDURE — 99215 OFFICE O/P EST HI 40 MIN: CPT | Mod: 25

## 2023-06-06 NOTE — HISTORY OF PRESENT ILLNESS
[FreeTextEntry1] : fu  Gi  note [de-identified] : Pt is feeling good  still has gas   she is moving her bm  and eating well.    She doesn’t have black stools , nausea or vomiting  or  rectal bleeding   or  weight loss.    She doesn’t have abd pain.    Pt  had mrcp  EXAM: [id number] - MR MRCP [facility] IC  - ORDERED BY:  RODOLFO RAZO\par \par \par PROCEDURE DATE:  04/14/2023\par \par \par \par INTERPRETATION:  CLINICAL INFORMATION: Pancreatic duct dilatation\par \par COMPARISON: 2/1/2022, 3/8/2015\par \par CONTRAST/COMPLICATIONS:\par IV Contrast: Gadavist  7.5 cc administered   0 cc discarded\par Oral Contrast: NONE\par Complications: None reported at time of study completion\par \par PROCEDURE:\par MRI of the abdomen was performed.\par MRCP was performed.\par \par FINDINGS:\par Limited exam due to respiratory motion.\par LOWER CHEST: Within normal limits.\par \par LIVER: Fatty infiltration of the liver.\par BILE DUCTS: Normal caliber. Common bile duct is normal in caliber measuring 0.5 cm. Low medial insertion of the cystic duct again noted.\par GALLBLADDER: Within normal limits.\par SPLEEN: Within normal limits.\par PANCREAS: Redemonstration of dilatation of the main pancreatic duct in the body and tail measuring up to 0.8 cm. Filling defects within the duct likely represent stones, however evaluation is limited by motion. Pancreatic body and tail are atrophic. There is a 0.8 cm cyst in the uncinate process of the pancreas, unchanged.\par ADRENALS: Within normal limits.\par KIDNEYS/URETERS: Tiny cyst in the right kidney.\par \par VISUALIZED PORTIONS:\par BOWEL: Colonic diverticulosis.\par PERITONEUM: No ascites.\par VESSELS: Within normal limits.\par RETROPERITONEUM/LYMPH NODES: No lymphadenopathy.\par ABDOMINAL WALL: Within normal limits.\par BONES: Within normal limits.\par \par IMPRESSION:\par Motion limited exam.\par \par Evidence of chronic pancreatitis with dilatation of the main pancreatic duct within the body and tail, similar in appearance to prior.\par \par Additional findings as above.\par \par

## 2023-06-06 NOTE — HEALTH RISK ASSESSMENT
[No] : In the past 12 months have you used drugs other than those required for medical reasons? No [No falls in past year] : Patient reported no falls in the past year [0] : 2) Feeling down, depressed, or hopeless: Not at all (0) [PHQ-2 Negative - No further assessment needed] : PHQ-2 Negative - No further assessment needed [Never] : Never [de-identified] : walks   [de-identified] : diabetic  diet low fat  [[id number]] : 0

## 2023-06-06 NOTE — ASSESSMENT
[FreeTextEntry1] : 1  celiac      her antibodies are normal in Mar h and is watching her gluten intake   \par 2  2  chronic pancreatitis  and dilated pancreatic duct  are stable    \par 3  lasctose intolerance      we discussed taking her lactqaind  before  meals  5mins  and  this will help her with the gas  Lactose intolerance is a condition that makes it hard for your body to digest milk and foods made with milk (called dairy products). If you have lactose intolerance and you eat dairy products, you can get diarrhea, belly pain, and gas.\par Lactose intolerance can affect anyone. But it is most common among , , and black people.\par In people who do not have lactose intolerance, the body makes a protein called an "enzyme" that breaks down lactose, the main form of sugar found in milk. In people who do have lactose intolerance, the body either does not make enough of the enzyme, or the enzyme does not work as well as it should. Also, some infections, such as you might get with food poisoning, can damage the enzyme. But if that happens, the problem usually goes away within a few weeks. Luckily, people with lactose intolerance can take an enzyme supplement to help with their problem.\par What are the symptoms of lactose intolerance?The symptoms happen only after you eat dairy foods. They can include:\par ?Cramps or belly pain (usually around or below the belly button)\par ?Bloating (feeling like your belly is full of air)\par ?Gas\par ?Diarrhea (often it is bulky, foamy, and watery)\par ?Vomiting (this happens mostly in teens)\par Is there a test for lactose intolerance?Yes, there are 2 ways to test for lactose intolerance. One is a breathing test, and one is a blood test. The breathing test is more common.\par Your doctor or nurse will tell you how to prepare for your test. You will not be able to eat or drink anything for several hours before the test. Plus, you might have to change your medicines or stop smoking for a while before the test.\par ?Lactose hydrogen breath test – For this test, you drink a liquid that has lactose in it. Then you breathe into a special machine every 30 minutes. The machine measures how much hydrogen you breathe out. People who have lactose intolerance breathe out more hydrogen than normal.\par ?Lactose tolerance test – For this test, you drink a liquid that has lactose in it. The doctor or nurse will take blood samples from you when the test starts, and again 1 and 2 hours later. If your blood has low levels of sugar after you drink the lactose, it means you probably have lactose intolerance.\par  If you think you might have lactose intolerance, tell your doctor or nurse. He or she can ask you questions to make sure that there are no other problems.\par How is lactose intolerance treated?Treatment differs depending on how severe the problem is. But in general, treatment can include:\par ?Eating less dairy food\par ?Finding non-dairy sources of nutrients (such as calcium and vitamin D) and protein\par ?Taking an enzyme supplement that will help you break down dairy foods\par How do I reduce the amount of dairy foods I eat?You can start by cutting down but not stopping foods you know contain dairy. Dairy foods should be consumed with meals. Dairy foods include milk, cream, ice cream, yogurt, cheese, and butter. This table shows how much lactose is in some common dairy foods .\par Your doctor or nurse might suggest that you talk to a nutritionist to learn which foods have lactose. The nutritionist can also make sure that you get enough calcium and vitamin D in your diet.\par If you are really sensitive to dairy foods or lactose, you will also need to read the labels on everything you eat. Milk or lactose is sometimes added to foods you might not suspect, such as cereal, instant soups, and salad dressings. Check the ingredient list of foods for anything that might suggest lactose. Look for these words:\par ?Milk, "milk byproducts," "dry milk powder," and "dry milk solids"\par ?Lactose\par ?Whey (whey is milk that has gone sour)\par Although some medicines are made with lactose, most people who are lactose intolerant can handle the very small amount in medicines.\par Which enzyme supplement should I use?There are many enzyme supplements to choose from, including Lactaid (tablets or liquid), Lactrase, LactAce, Dairy Ease, and Lactrol. You should take the supplement right before you start eating. If you forget, you can take it during the meal, but it might not work as well.\par The important thing to know is that each product works a bit differently for each person. Plus, none of them can break down every last bit of lactose, so some people still have symptoms even with an enzyme supplement.\par Should I take calcium or vitamin D supplements?That depends on whether you completely avoid dairy foods. If you do, your doctor or nurse might recommend calcium supplements. He or she might also check your vitamin D levels to decide whether you should take supplements.\par Is lactose intolerance basically a food allergy?No. There are people who are allergic to milk and dairy foods. But the symptoms of a dairy allergy are often different from those of lactose intolerance. In the case of an allergy, the body reacts to the protein in milk, rather than to the sugar. Plus, allergies involve the body's infection-fighting system, called the immune system. Lactose intolerance does not.\par \par 4.  fatty liver  Fatty Liver: The patient denies any jaundice or pruritus. The patient denies any alcohol use. The patient denies taking large doses of nonsteroidal anti-inflammatory drugs or acetaminophen. The findings are suggestive of fatty liver. The patient and I had a long discussion regarding the risks of fatty liver progressing to cirrhosis. The patient was told of the possible increased risk of developing liver failure, cirrhosis, ascites, GI bleeding secondary to varices, hepatic encephalopathy, bleeding tendencies and liver cancer. The patient was told of the importance of follow-up. The patient was advised to follow up every 6 months for blood work and imaging studies. The patient agreed and will follow up. The patient was advised to lose weight. I recommend a trial of vitamin E supplementation for the fatty liver. If the liver enzymes remain elevated, the patient may require a trial of Pioglitazone for the fatty liver. I recommend avoid alcohol and hepato-toxic agents. The patient was also advised to avoid NSAIDs, Acetaminophen and any other hepatotoxic drugs. The patient was also advised not to share needles, razors, scissors, nail clippers, etc.. The patient is to continue close follow-up in our office for blood work and exams. If the liver enzymes remain elevated, the patient may require a CT guided liver biopsy to assess the liver parenchyma and for possible treatment. We had a long discussion regarding the risks and benefits of the procedure. The patient was told of the risks of bleeding, perforation, infections, emergency surgery and missing lesions. The patient agreed and will follow-up to reassess the symptoms Patient has been counseled on life style interventions, including but not limited to: weight loss (3-5% loss of body weight might improve fat in the liver, while 7-10% needed for potential improvement of other components, including inflammation and scarring of the liver, called fibrosis); healthy diet, avoiding added sugars, sodas, avoiding saturated fats, limiting sodium, avoiding alcohol; and on the importance of regular exercise (> 150 min/week moderate intensity aerobic exercise with at least 2x/week muscle strengthening or exercise as tolerated). \par - I explained to patient the natural Hx of NAFLD. \par \par 5  dm  ---The following has been discussed:---\par -Targets for weight and HgA1c have been discussed with patient \par -FS goals have been reviewed with the patient in detail:\par AM <130 post meal<160-180\par -Diet and weight goals have been discussed with the patient in detail.\par -The importance of exercise in the treatment of diabetes has been discussed \par with the patient in detail.\par -Extensive dietary advice provided to patient and the need to avoid concentrated \par sweets/simple carbohydrates and to ensure to consume protein with each meal. \par -Patient instructed to limit carbohydrates to 60 gms per meal and 15 gms per \par snacks. \par -Patient to keep a blood sugar log to check fasting and before meals\par -Patient instructed on importance of daily feet inspection and to reports any \par open lesions to physician promptly\par \par

## 2023-06-06 NOTE — PHYSICAL EXAM
[Normal Sclera/Conjunctiva] : normal sclera/conjunctiva [PERRL] : pupils equal round and reactive to light [Normal Oropharynx] : the oropharynx was normal [No JVD] : no jugular venous distention [Normal Rate] : normal rate  [Regular Rhythm] : with a regular rhythm [Normal S1, S2] : normal S1 and S2 [No Edema] : there was no peripheral edema [No Extremity Clubbing/Cyanosis] : no extremity clubbing/cyanosis [Normal] : affect was normal and insight and judgment were intact

## 2023-06-06 NOTE — INTERPRETER SERVICES
[[employer] ] : provided by [employer]   [Time Spent: ____ minutes] : Total time spent using  services: [unfilled] minutes. The patient's primary language is not English thus required  services. [Interpreters_IDNumber] : [id number] [Interpreters_FullName] : [place]  [TWNoteComboBox1] : [place]

## 2023-06-07 LAB
AMYLASE/CREAT SERPL: 87 U/L
FERRITIN SERPL-MCNC: 20 NG/ML
FOLATE SERPL-MCNC: 12 NG/ML
IRON SATN MFR SERPL: 7 %
IRON SERPL-MCNC: 26 UG/DL
LPL SERPL-CCNC: 42 U/L
TIBC SERPL-MCNC: 347 UG/DL
UIBC SERPL-MCNC: 321 UG/DL
VIT B12 SERPL-MCNC: 388 PG/ML

## 2023-06-07 RX ORDER — FERROUS SULFATE TAB EC 324 MG (65 MG FE EQUIVALENT) 324 (65 FE) MG
324 (65 FE) TABLET DELAYED RESPONSE ORAL
Qty: 90 | Refills: 3 | Status: ACTIVE | COMMUNITY
Start: 2023-06-07 | End: 1900-01-01

## 2023-06-07 RX ORDER — MULTIVIT-MIN/FOLIC/VIT K/LYCOP 400-300MCG
250 TABLET ORAL
Qty: 90 | Refills: 3 | Status: ACTIVE | COMMUNITY
Start: 2023-06-07 | End: 1900-01-01

## 2023-07-09 ENCOUNTER — NON-APPOINTMENT (OUTPATIENT)
Age: 83
End: 2023-07-09

## 2023-07-10 RX ORDER — METHYLPRED ACET/NACL,ISO-OS/PF 40 MG/ML
40 VIAL (ML) INJECTION
Qty: 1 | Refills: 0 | Status: COMPLETED | OUTPATIENT
Start: 2023-05-18

## 2023-08-17 ENCOUNTER — APPOINTMENT (OUTPATIENT)
Dept: RHEUMATOLOGY | Facility: CLINIC | Age: 83
End: 2023-08-17
Payer: MEDICAID

## 2023-08-17 VITALS
HEIGHT: 57 IN | DIASTOLIC BLOOD PRESSURE: 74 MMHG | BODY MASS INDEX: 28.48 KG/M2 | SYSTOLIC BLOOD PRESSURE: 121 MMHG | TEMPERATURE: 98.3 F | HEART RATE: 89 BPM | OXYGEN SATURATION: 97 % | WEIGHT: 132 LBS | RESPIRATION RATE: 16 BRPM

## 2023-08-17 DIAGNOSIS — M77.02 MEDIAL EPICONDYLITIS, LEFT ELBOW: ICD-10-CM

## 2023-08-17 DIAGNOSIS — M77.00 MEDIAL EPICONDYLITIS, UNSPECIFIED ELBOW: ICD-10-CM

## 2023-08-17 DIAGNOSIS — M47.816 SPONDYLOSIS W/OUT MYELOPATHY OR RADICULOPATHY, LUMBAR REGION: ICD-10-CM

## 2023-08-17 DIAGNOSIS — M47.26 OTHER SPONDYLOSIS WITH RADICULOPATHY, LUMBAR REGION: ICD-10-CM

## 2023-08-17 PROCEDURE — 20550 NJX 1 TENDON SHEATH/LIGAMENT: CPT

## 2023-08-17 PROCEDURE — 99214 OFFICE O/P EST MOD 30 MIN: CPT | Mod: 25

## 2023-08-17 RX ADMIN — Medication 0 %: at 00:00

## 2023-08-17 RX ADMIN — METHYLPREDNISOLONE ACETATE 0 MG/ML: 40 INJECTION, SUSPENSION INTRA-ARTICULAR; INTRALESIONAL; INTRAMUSCULAR; SOFT TISSUE at 00:00

## 2023-08-18 RX ORDER — METHYLPRED ACET/NACL,ISO-OS/PF 40 MG/ML
40 VIAL (ML) INJECTION
Qty: 1 | Refills: 0 | Status: COMPLETED | OUTPATIENT
Start: 2023-08-17

## 2023-08-19 PROBLEM — M77.00 MEDIAL EPICONDYLITIS: Status: ACTIVE | Noted: 2022-09-01

## 2023-08-19 NOTE — HISTORY OF PRESENT ILLNESS
[FreeTextEntry1] : Patient reports tolerating hydroxychloroquine with alternate day dosing daily.    Blood  testing with hepatic and renal function within range though with bump in hemoglobin A1c to 8.0%.  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  She notes pain however in the LT elbow that restricts from reaching towards the back and raising arm overhead.  She explains difficulty in lifting bags.  She denies accompanied swelling or paresthesias or recent trauma..  She refrains from NSAIDs tx and takes intermittent Acetaminophen tx.   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  Bone density reflects osteoporosis in the right femoral head at a T score of -2.5. Patient continues on calcium and vitamin D twice daily along with bisphosphate therapy weekly. She otherwise denies recent falls, fractures or dentition loss.   She further denies new systemic symptoms or symptoms of Raynaud's, rash or photosensitivity.

## 2023-08-19 NOTE — ASSESSMENT
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   She understands the importance of yearly Opthalmology screens for visual screen testing to assess for retinal toxicity.  She understands the importance of sun avoidance and the application of SPF protection as well as the use of wide brim hats. Emphasized importance of health screening in the setting of ds DNA Abs in this age group as well as discussed in detail the importance of lowering hemoglobin A1c.  PT continued for b/l shoulders.  Topical anti-inflammatory given for pain relief. 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  Point of care ultrasound over the left elbow reflects DJD changes without active synovitis over the enthesis or joint capsule.   Patient will benefit from physical therapy to help with joint mobility and muscle strengthening.  Cortisone injection given into the LT  greater trochanteric bursa for pain relief; IT band strengthening demonstrated and encouraged.  Patient will continue with lifestyle and exercise modification to address fatty liver and cardiovascular health.  She will continue on Calcium and VitD at the recommended doses of 1200mg and 800IU daily, respectively.  We reviewed calcium and VitD enriched foods as well. Recommend  bisphosphonate therapy weekly.  She is in agreement with the above plan and will return in three month' time.

## 2023-08-19 NOTE — REVIEW OF SYSTEMS
[Arthralgias] : arthralgias [Joint Stiffness] : joint stiffness [Difficulty Walking] : difficulty walking [Fever] : no fever [Chills] : no chills [Eye Pain] : no eye pain [Sore Throat] : no sore throat [Hoarseness] : no hoarseness [Chest Pain] : no chest pain [Palpitations] : no palpitations [Dysuria] : no dysuria [SOB on Exertion] : no shortness of breath during exertion [Joint Swelling] : no joint swelling [Skin Lesions] : no skin lesions [Depression] : no depression [Muscle Weakness] : no muscle weakness [Feelings Of Weakness] : no feelings of weakness [Easy Bleeding] : no tendency for easy bleeding [Easy Bruising] : no tendency for easy bruising

## 2023-08-19 NOTE — ASSESSMENT
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   She understands the importance of yearly Opthalmology screens for visual screen testing to assess for retinal toxicity.  She understands the importance of sun avoidance and the application of SPF protection as well as the use of wide brim hats. Emphasized importance of health screening in the setting of ds DNA Abs in this age group as well as discussed in detail the importance of lowering hemoglobin A1c.  PT continued for b/l shoulders.  Topical anti-inflammatory given for pain relief. 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  Cortisone injection given to the LT medial epicondyle for pain relief.   Patient will benefit from physical therapy to help with joint mobility and muscle strengthening. IT band strengthening demonstrated and encouraged.  Patient will continue with lifestyle and exercise modification to address fatty liver and cardiovascular health.  She will continue on Calcium and VitD at the recommended doses of 1200mg and 800IU daily, respectively.  We reviewed calcium and VitD enriched foods as well. Recommend  bisphosphonate therapy weekly.  She is in agreement with the above plan and will return in three month' time.

## 2023-08-19 NOTE — HISTORY OF PRESENT ILLNESS
[FreeTextEntry1] : Patient reports tolerating hydroxychloroquine with alternate day dosing daily.    Blood  testing with hepatic and renal function within range though with bump in hemoglobin A1c to 7.2%.  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  She refrains from NSAIDs tx and takes intermittent Acetaminophen tx.  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  She reports pain at times can radiate to the mid thigh on the lateral side.  She has difficulty sleeping on that side as well.  She also notes pain over the left elbow over the last few weeks and explains difficulty in lifting bags.  She denies accompanied swelling or paresthesias recent trauma.\par Bone density reflects osteoporosis in the right femoral head at a T score of -2.5. Patient continues on calcium and vitamin D twice daily along with bisphosphate therapy weekly. She otherwise denies recent falls, fractures or dentition loss.  \par She further denies new systemic symptoms or symptoms of Raynaud's, rash or photosensitivity.

## 2023-08-19 NOTE — PROCEDURE
[Other Date:___] : Date: [unfilled] [Patient] : the patient [Risks] : risks [Benefits] : benefits [Consent Obtained] : written consent was obtained prior to the procedure and is detailed in the patient's record [Therapeutic] : therapeutic [#1 Site: ______] : #1 site identified in the [unfilled] [Depomedrol ___ mg] : Depomedrol [unfilled] mg [Tolerated Well] : the patient tolerated the procedure well [No Complications] : there were no complications [Patient Instructed to Call] : patient was instructed to call if redness at site, a decrease in range of motion or an increase in pain is noted after procedure.

## 2023-08-19 NOTE — PROCEDURE
[Other Date:___] : Date: [unfilled] [Consent Obtained] : written consent was obtained prior to the procedure and is detailed in the patient's record [Diagnostic] : diagnostic  [Therapeutic] : therapeutic [#1 Site: ______] : #1 site identified in the [unfilled] [#2 Site: ___] : # 2 site identified in the [unfilled] [Ethyl Chloride] : ethyl chloride [Betadine] : with betadine solution [25 gauge 1.5 inch] : A 25 gauge 1.5 inch needle was used [___ml 1% Lidocaine] : [unfilled] ml of 1% lidocaine [Depomedrol ___ mg] : Depomedrol [unfilled] mg [Tolerated Well] : the patient tolerated the procedure well [No Complications] : there were no complications [Patient Instructed to Call] : patient was instructed to call if redness at site, a decrease in range of motion or an increase in pain is noted after procedure. [FreeTextEntry1] : Patient name: Mirta Cyr\par : 1940\par DOS: \par Limited US study of LT elbow \par Indication: Pain in LT elbow \par Study Site: LT elbow\par Equipment: Solfo e 12MHz linear transducer \par \par \par Findings: Orthogonal views of the anterior, posterior, medial and lateral\par aspects of the elbow were obtained in grey scale and cpd.\par \par Minimal hypoechoic pocket in joint capsule in short axis anterior and short and long of posterior view. The biceps brachii and brachialis are normal. The common flexor and extensor tendon are slightly thickened . Capsule annular ligament and ulnar collateral ligament are normal. Hyperechoic densities scattered within tendons and ligaments stated. No significant triceps brachii abnormality . \par Radial and medial nerve at level of elbow are normal . Cortical irregularities of hyperechoic linear structure appreciated over the medial and lateral epicondyles of the humerus. No muscle or tendon or tendon sheath abnormalities seen in the anterior or posterior compartments.  Olecranon bursa without distention.  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

## 2023-08-19 NOTE — PHYSICAL EXAM
[General Appearance - Alert] : alert [General Appearance - In No Acute Distress] : in no acute distress [Sclera] : the sclera and conjunctiva were normal [Nail Clubbing] : no clubbing  or cyanosis of the fingernails [] : no rash [Skin Lesions] : no skin lesions [Motor Exam] : the motor exam was normal [Oriented To Time, Place, And Person] : oriented to person, place, and time [Impaired Insight] : insight and judgment were intact [Auscultation Breath Sounds / Voice Sounds] : lungs were clear to auscultation bilaterally [FreeTextEntry1] : No active synovitis; moderate tenderness appreciated over the left medial epicondyle; shoulder abduction maintained on the left ;  appropriate external rotation of the hips,  bilaterally.

## 2023-08-19 NOTE — REASON FOR VISIT
[Follow-Up: _____] : a [unfilled] follow-up visit [[employer] ] : provided by [employer]   [Interpreters_IDNumber] : [id number] [Time Spent: ____ minutes] : Total time spent using  services: [unfilled] minutes. The patient's primary language is not English thus required  services. [Interpreters_FullName] : Male

## 2023-08-19 NOTE — REASON FOR VISIT
[Follow-Up: _____] : a [unfilled] follow-up visit [[employer] ] : provided by [employer]   [Interpreters_FullName] : Sonja [Interpreters_IDNumber] : [id number]

## 2023-08-19 NOTE — PHYSICAL EXAM
[General Appearance - Alert] : alert [General Appearance - In No Acute Distress] : in no acute distress [Sclera] : the sclera and conjunctiva were normal [Nail Clubbing] : no clubbing  or cyanosis of the fingernails [] : no rash [Skin Lesions] : no skin lesions [Motor Exam] : the motor exam was normal [Oriented To Time, Place, And Person] : oriented to person, place, and time [Impaired Insight] : insight and judgment were intact [FreeTextEntry1] : No active synovitis; moderate tenderness appreciated over the left medial epicondyle; shoulder abduction maintained on the left ; tenderness appreciated over the left lateral trochanteric bursa with appropriate external rotation of the hips,  bilaterally.

## 2023-09-01 ENCOUNTER — APPOINTMENT (OUTPATIENT)
Dept: ENDOCRINOLOGY | Facility: CLINIC | Age: 83
End: 2023-09-01

## 2023-09-06 LAB
ALBUMIN SERPL ELPH-MCNC: 4.3 G/DL
ALP BLD-CCNC: 67 U/L
ALT SERPL-CCNC: 10 U/L
ANION GAP SERPL CALC-SCNC: 13 MMOL/L
AST SERPL-CCNC: 12 U/L
BILIRUB DIRECT SERPL-MCNC: 0.2 MG/DL
BILIRUB INDIRECT SERPL-MCNC: 0.3 MG/DL
BILIRUB SERPL-MCNC: 0.4 MG/DL
BUN SERPL-MCNC: 20 MG/DL
CALCIUM SERPL-MCNC: 9.4 MG/DL
CHLORIDE SERPL-SCNC: 104 MMOL/L
CHOLEST SERPL-MCNC: 136 MG/DL
CO2 SERPL-SCNC: 25 MMOL/L
CREAT SERPL-MCNC: 0.72 MG/DL
CREAT SPEC-SCNC: 115 MG/DL
EGFR: 84 ML/MIN/1.73M2
ESTIMATED AVERAGE GLUCOSE: 157 MG/DL
FRUCTOSAMINE SERPL-MCNC: 263 UMOL/L
GLUCOSE BS SERPL-MCNC: 115 MG/DL
GLUCOSE SERPL-MCNC: 122 MG/DL
HBA1C MFR BLD HPLC: 7.1 %
HDLC SERPL-MCNC: 61 MG/DL
LDLC SERPL CALC-MCNC: 60 MG/DL
MICROALBUMIN 24H UR DL<=1MG/L-MCNC: 2.2 MG/DL
MICROALBUMIN/CREAT 24H UR-RTO: 19 MG/G
NONHDLC SERPL-MCNC: 75 MG/DL
POTASSIUM SERPL-SCNC: 4.8 MMOL/L
PROT SERPL-MCNC: 6.6 G/DL
SODIUM SERPL-SCNC: 141 MMOL/L
T4 FREE SERPL-MCNC: 1.3 NG/DL
TRIGL SERPL-MCNC: 74 MG/DL
TSH SERPL-ACNC: 2.42 UIU/ML

## 2023-09-07 ENCOUNTER — APPOINTMENT (OUTPATIENT)
Dept: ENDOCRINOLOGY | Facility: CLINIC | Age: 83
End: 2023-09-07
Payer: MEDICAID

## 2023-09-07 VITALS
HEIGHT: 57 IN | HEART RATE: 71 BPM | TEMPERATURE: 97.4 F | OXYGEN SATURATION: 98 % | BODY MASS INDEX: 28.69 KG/M2 | SYSTOLIC BLOOD PRESSURE: 119 MMHG | RESPIRATION RATE: 16 BRPM | DIASTOLIC BLOOD PRESSURE: 75 MMHG | WEIGHT: 133 LBS

## 2023-09-07 LAB — GLUCOSE BLDC GLUCOMTR-MCNC: 137

## 2023-09-07 PROCEDURE — 99214 OFFICE O/P EST MOD 30 MIN: CPT | Mod: 25

## 2023-09-07 PROCEDURE — 82962 GLUCOSE BLOOD TEST: CPT

## 2023-09-07 NOTE — DATA REVIEWED
[FreeTextEntry1] : The FBS and HbA1c improved good diabetic control. The renal function is fine. There is no microalbuminuria. The lipid panel was fine. The TSH and Free T4 were normal.

## 2023-09-07 NOTE — HISTORY OF PRESENT ILLNESS
[FreeTextEntry1] : Patient feels well, she is asymptomatic. Her weight is stable. She is walking regularly and following the diet. Her blood sugars at home are well controlled, they are usually around 110 to 120 mg/dl. She denies low blood glucose during the night. She denies chest pain, or SOB. Denies numbness, tingling or burning sensation on her extremities. Taking her medications regularly. She has seen the Ophthalmologist recently. She has seen the Podiatrist recently. She has not seen the Cardiologist recently.

## 2023-09-07 NOTE — ASSESSMENT
[FreeTextEntry1] : Patient is doing well Her weight is stable The diabetes is well controlled No Diabetic Retinopathy or Nephropathy present at this time Will continue the same treatment Advised to take her medications regularly Advised to follow the diet and exercise regularly The thyroid tests were normal

## 2023-09-12 ENCOUNTER — APPOINTMENT (OUTPATIENT)
Dept: HEPATOLOGY | Facility: CLINIC | Age: 83
End: 2023-09-12

## 2023-09-20 ENCOUNTER — APPOINTMENT (OUTPATIENT)
Dept: RHEUMATOLOGY | Facility: CLINIC | Age: 83
End: 2023-09-20
Payer: MEDICAID

## 2023-09-20 VITALS
OXYGEN SATURATION: 95 % | HEART RATE: 76 BPM | DIASTOLIC BLOOD PRESSURE: 75 MMHG | TEMPERATURE: 97.7 F | WEIGHT: 131 LBS | RESPIRATION RATE: 16 BRPM | BODY MASS INDEX: 28.26 KG/M2 | SYSTOLIC BLOOD PRESSURE: 110 MMHG | HEIGHT: 57 IN

## 2023-09-20 DIAGNOSIS — M25.562 PAIN IN LEFT KNEE: ICD-10-CM

## 2023-09-20 PROCEDURE — 99214 OFFICE O/P EST MOD 30 MIN: CPT | Mod: 25

## 2023-09-20 PROCEDURE — T1013: CPT

## 2023-09-20 PROCEDURE — 20611 DRAIN/INJ JOINT/BURSA W/US: CPT | Mod: LT

## 2023-09-20 RX ORDER — METHYLPRED ACET/NACL,ISO-OS/PF 80 MG/ML
80 VIAL (ML) INJECTION
Qty: 1 | Refills: 0 | Status: COMPLETED | OUTPATIENT
Start: 2023-09-20

## 2023-09-20 RX ORDER — LIDOCAINE HYDROCHLORIDE 10 MG/ML
1 INJECTION, SOLUTION INFILTRATION; PERINEURAL
Qty: 0 | Refills: 0 | Status: COMPLETED | OUTPATIENT
Start: 2023-09-20

## 2023-09-20 RX ADMIN — Medication 0 %: at 00:00

## 2023-09-20 RX ADMIN — Medication 0 MG/ML: at 00:00

## 2023-11-28 ENCOUNTER — APPOINTMENT (OUTPATIENT)
Dept: RHEUMATOLOGY | Facility: CLINIC | Age: 83
End: 2023-11-28

## 2024-01-11 ENCOUNTER — APPOINTMENT (OUTPATIENT)
Dept: RHEUMATOLOGY | Facility: CLINIC | Age: 84
End: 2024-01-11

## 2024-01-25 ENCOUNTER — APPOINTMENT (OUTPATIENT)
Dept: ENDOCRINOLOGY | Facility: CLINIC | Age: 84
End: 2024-01-25

## 2024-02-20 ENCOUNTER — APPOINTMENT (OUTPATIENT)
Dept: RHEUMATOLOGY | Facility: CLINIC | Age: 84
End: 2024-02-20
Payer: MEDICAID

## 2024-02-20 VITALS
HEIGHT: 57 IN | TEMPERATURE: 98.7 F | RESPIRATION RATE: 16 BRPM | OXYGEN SATURATION: 98 % | BODY MASS INDEX: 28.69 KG/M2 | SYSTOLIC BLOOD PRESSURE: 130 MMHG | DIASTOLIC BLOOD PRESSURE: 79 MMHG | HEART RATE: 89 BPM | WEIGHT: 133 LBS

## 2024-02-20 DIAGNOSIS — M17.12 UNILATERAL PRIMARY OSTEOARTHRITIS, LEFT KNEE: ICD-10-CM

## 2024-02-20 PROCEDURE — T1013A: CUSTOM

## 2024-02-20 PROCEDURE — G2211 COMPLEX E/M VISIT ADD ON: CPT | Mod: NC,1L

## 2024-02-20 PROCEDURE — 99214 OFFICE O/P EST MOD 30 MIN: CPT

## 2024-02-20 NOTE — PHYSICAL EXAM
[General Appearance - Alert] : alert [General Appearance - In No Acute Distress] : in no acute distress [Sclera] : the sclera and conjunctiva were normal [Auscultation Breath Sounds / Voice Sounds] : lungs were clear to auscultation bilaterally [Heart Sounds] : normal S1 and S2 [Nail Clubbing] : no clubbing  or cyanosis of the fingernails [] : no rash [Skin Lesions] : no skin lesions [Motor Exam] : the motor exam was normal [Oriented To Time, Place, And Person] : oriented to person, place, and time [Impaired Insight] : insight and judgment were intact [FreeTextEntry1] : No active synovitis;  shoulder abduction maintained on the left ; min  tender over LT medial jonit line with mild tenderness upon forced flexion; appropriate external rotation of the hips,  bilaterally.

## 2024-02-20 NOTE — HISTORY OF PRESENT ILLNESS
[FreeTextEntry1] : Patient reports tolerating hydroxychloroquine with alternate day dosing daily.  Ophthalmology surveillance due in March.   Blood  testing from September 2023 with hepatic and renal function within range with improvement of hemoglobin A1c to 7.1 from 8 range.  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  She notes pain in the LT knee is intermittent with occasional laxity symptoms.   She denies accompanied swelling or paresthesia or recent trauma..  She refrains from NSAIDs tx and takes intermittent Acetaminophen tx.   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  Bone density reflects osteoporosis in the right femoral head at a T score of -2.5. Patient continues on calcium and vitamin D twice daily along with bisphosphate therapy weekly. She otherwise denies recent falls, fractures or dentition loss.   She further denies new systemic symptoms or symptoms of Raynaud's, rash or photosensitivity.

## 2024-02-20 NOTE — CONSULT LETTER
[Dear  ___] : Dear  [unfilled], [Consult Letter:] : I had the pleasure of evaluating your patient, [unfilled]. [Please see my note below.] : Please see my note below. [Consult Closing:] : Thank you very much for allowing me to participate in the care of this patient.  If you have any questions, please do not hesitate to contact me. [Sincerely,] : Sincerely, [FreeTextEntry3] : Susanna Bui M.D., [facility]  of Medicine  [facility] [employer] at [facility]  [FreeTextEntry2] : Jeremiah Stovall MD [address] [place] [id number]

## 2024-02-20 NOTE — REASON FOR VISIT
[Follow-Up: _____] : a [unfilled] follow-up visit [[employer] ] : provided by [employer]   [Time Spent: ____ minutes] : Total time spent using  services: [unfilled] minutes. The patient's primary language is not English thus required  services. [Interpreters_IDNumber] : [id number] [Interpreters_FullName] : Reji

## 2024-02-20 NOTE — ASSESSMENT
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   She understands the importance of yearly Ophthalmology screens for visual screen testing to assess for retinal toxicity.  She understands the importance of sun avoidance and the application of SPF protection as well as the use of wide brim hats. Emphasized importance of health screening in the setting of ds DNA Abs in this age group as well as discussed in detail the importance of lowering hemoglobin A1c.  PT continued for b/l shoulders.  Topical anti-inflammatory given for pain relief. 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 Patient will benefit from physical therapy to help with joint mobility and muscle strengthening. IT band strengthening demonstrated and encouraged.  Patient will benefit from physical therapy to help with joint mobility and muscle strengthening.  Quadriceps strengthening exercises demonstrated in the office.  Weight loss has been encouraged to reduce load over the medial joint line. Viscosupplementation has been encouraged to provide additional lubrication and joint support.   Patient will continue with lifestyle and exercise modification to address fatty liver and cardiovascular health.  She will continue on Calcium and VitD at the recommended doses of 1200mg and 800IU daily, respectively.  We reviewed calcium and VitD enriched foods as well. Recommend  bisphosphonate therapy weekly.  She is in agreement with the above plan and will return in three month' time.

## 2024-02-26 ENCOUNTER — APPOINTMENT (OUTPATIENT)
Dept: RADIOLOGY | Facility: CLINIC | Age: 84
End: 2024-02-26
Payer: MEDICAID

## 2024-02-26 PROCEDURE — 77080 DXA BONE DENSITY AXIAL: CPT

## 2024-02-28 LAB
25(OH)D3 SERPL-MCNC: 32.8 NG/ML
ALBUMIN SERPL ELPH-MCNC: 4.3 G/DL
ALP BLD-CCNC: 74 U/L
ALT SERPL-CCNC: 11 U/L
ANION GAP SERPL CALC-SCNC: 11 MMOL/L
AST SERPL-CCNC: 13 U/L
BILIRUB DIRECT SERPL-MCNC: 0.1 MG/DL
BILIRUB INDIRECT SERPL-MCNC: 0.2 MG/DL
BILIRUB SERPL-MCNC: 0.4 MG/DL
BUN SERPL-MCNC: 19 MG/DL
CALCIUM SERPL-MCNC: 9.4 MG/DL
CHLORIDE SERPL-SCNC: 103 MMOL/L
CHOLEST SERPL-MCNC: 123 MG/DL
CO2 SERPL-SCNC: 25 MMOL/L
CREAT SERPL-MCNC: 0.65 MG/DL
CREAT SPEC-SCNC: 80 MG/DL
EGFR: 88 ML/MIN/1.73M2
ESTIMATED AVERAGE GLUCOSE: 166 MG/DL
FRUCTOSAMINE SERPL-MCNC: 271 UMOL/L
GLUCOSE BS SERPL-MCNC: 125 MG/DL
GLUCOSE SERPL-MCNC: 129 MG/DL
HBA1C MFR BLD HPLC: 7.4 %
HDLC SERPL-MCNC: 62 MG/DL
LDLC SERPL CALC-MCNC: 44 MG/DL
MICROALBUMIN 24H UR DL<=1MG/L-MCNC: <1.2 MG/DL
MICROALBUMIN/CREAT 24H UR-RTO: NORMAL MG/G
NONHDLC SERPL-MCNC: 61 MG/DL
POTASSIUM SERPL-SCNC: 4.5 MMOL/L
PROT SERPL-MCNC: 6.7 G/DL
SODIUM SERPL-SCNC: 139 MMOL/L
T4 FREE SERPL-MCNC: 1.3 NG/DL
TRIGL SERPL-MCNC: 90 MG/DL
TSH SERPL-ACNC: 2.11 UIU/ML

## 2024-03-04 ENCOUNTER — APPOINTMENT (OUTPATIENT)
Dept: ENDOCRINOLOGY | Facility: CLINIC | Age: 84
End: 2024-03-04
Payer: MEDICAID

## 2024-03-04 VITALS
BODY MASS INDEX: 28.91 KG/M2 | HEIGHT: 57 IN | OXYGEN SATURATION: 98 % | TEMPERATURE: 98.1 F | WEIGHT: 134 LBS | RESPIRATION RATE: 16 BRPM | SYSTOLIC BLOOD PRESSURE: 136 MMHG | HEART RATE: 80 BPM | DIASTOLIC BLOOD PRESSURE: 76 MMHG

## 2024-03-04 DIAGNOSIS — E78.2 MIXED HYPERLIPIDEMIA: ICD-10-CM

## 2024-03-04 DIAGNOSIS — M81.0 AGE-RELATED OSTEOPOROSIS W/OUT CURRENT PATHOLOGICAL FRACTURE: ICD-10-CM

## 2024-03-04 LAB — GLUCOSE BLDC GLUCOMTR-MCNC: 263

## 2024-03-04 PROCEDURE — 99214 OFFICE O/P EST MOD 30 MIN: CPT | Mod: 25

## 2024-03-04 PROCEDURE — 82962 GLUCOSE BLOOD TEST: CPT

## 2024-03-04 RX ORDER — BLOOD SUGAR DIAGNOSTIC
STRIP MISCELLANEOUS
Qty: 100 | Refills: 3 | Status: ACTIVE | COMMUNITY
Start: 2021-10-19 | End: 1900-01-01

## 2024-03-04 RX ORDER — DAPAGLIFLOZIN 10 MG/1
10 TABLET, FILM COATED ORAL
Qty: 90 | Refills: 2 | Status: ACTIVE | COMMUNITY
Start: 2022-06-23 | End: 1900-01-01

## 2024-03-04 RX ORDER — LINAGLIPTIN 5 MG/1
5 TABLET, FILM COATED ORAL
Qty: 90 | Refills: 3 | Status: ACTIVE | COMMUNITY
Start: 2023-02-06 | End: 1900-01-01

## 2024-03-04 RX ORDER — LANCETS
EACH MISCELLANEOUS
Qty: 100 | Refills: 3 | Status: ACTIVE | COMMUNITY
Start: 2020-10-28 | End: 1900-01-01

## 2024-03-04 RX ORDER — ISOPROPYL ALCOHOL 70 ML/100ML
SWAB TOPICAL
Qty: 400 | Refills: 3 | Status: ACTIVE | COMMUNITY
Start: 2020-07-09 | End: 1900-01-01

## 2024-03-04 NOTE — ASSESSMENT
[FreeTextEntry1] : Patient is doing well Her weight is stable The diabetes controlled has deteriorated slightly No Diabetic Retinopathy or Nephropathy present at this time The lipid panel was fine Will continue the same treatment Her BP was also fine Advised to take her medications regularly Advised to follow the diet and exercise regularly The thyroid tests were normal

## 2024-03-04 NOTE — DATA REVIEWED
[FreeTextEntry1] : The FBS and hbA1c have increased slightly. The renal function is fine. No microalbuminuria. The lipid panel was fine.

## 2024-03-04 NOTE — HISTORY OF PRESENT ILLNESS
[FreeTextEntry1] : Patient feels well, she is asymptomatic. No weight change. She is walking regularly and following the diet. Her blood sugars at home are well controlled, they are usually around 110 mg/dl. She denies low blood glucose during the night. She denies chest pain, or SOB. Denies numbness, tingling or burning sensation on her extremities. Taking her medications regularly. She has nseen the Ophthalmologist recently, no retinopathy. She has seen the Podiatrist recently. She has not seen the Cardiologist recently.

## 2024-05-21 ENCOUNTER — APPOINTMENT (OUTPATIENT)
Dept: RHEUMATOLOGY | Facility: CLINIC | Age: 84
End: 2024-05-21
Payer: MEDICAID

## 2024-05-21 VITALS
RESPIRATION RATE: 16 BRPM | SYSTOLIC BLOOD PRESSURE: 106 MMHG | HEIGHT: 57 IN | DIASTOLIC BLOOD PRESSURE: 68 MMHG | WEIGHT: 130.8 LBS | BODY MASS INDEX: 28.22 KG/M2 | OXYGEN SATURATION: 97 % | HEART RATE: 86 BPM | TEMPERATURE: 97.8 F

## 2024-05-21 DIAGNOSIS — M17.0 BILATERAL PRIMARY OSTEOARTHRITIS OF KNEE: ICD-10-CM

## 2024-05-21 DIAGNOSIS — M67.911 UNSPECIFIED DISORDER OF SYNOVIUM AND TENDON, RIGHT SHOULDER: ICD-10-CM

## 2024-05-21 PROCEDURE — 99214 OFFICE O/P EST MOD 30 MIN: CPT

## 2024-05-21 PROCEDURE — G2211 COMPLEX E/M VISIT ADD ON: CPT | Mod: NC,1L

## 2024-05-21 RX ORDER — ALENDRONATE SODIUM 70 MG/1
70 TABLET ORAL
Qty: 12 | Refills: 3 | Status: ACTIVE | COMMUNITY
Start: 2021-07-01 | End: 1900-01-01

## 2024-05-21 RX ORDER — HYDROXYCHLOROQUINE SULFATE 200 MG/1
200 TABLET, FILM COATED ORAL
Qty: 135 | Refills: 1 | Status: ACTIVE | COMMUNITY
Start: 2019-08-22 | End: 1900-01-01

## 2024-05-21 RX ORDER — DICLOFENAC SODIUM 1% 10 MG/G
1 GEL TOPICAL
Qty: 100 | Refills: 2 | Status: ACTIVE | COMMUNITY
Start: 2021-01-06 | End: 1900-01-01

## 2024-05-21 RX ORDER — CETIRIZINE HYDROCHLORIDE 10 MG/1
10 TABLET, COATED ORAL
Qty: 90 | Refills: 2 | Status: ACTIVE | COMMUNITY
Start: 2020-03-05 | End: 1900-01-01

## 2024-05-21 NOTE — CONSULT LETTER
[Dear  ___] : Dear  [unfilled], [Consult Letter:] : I had the pleasure of evaluating your patient, [unfilled]. [Please see my note below.] : Please see my note below. [Consult Closing:] : Thank you very much for allowing me to participate in the care of this patient.  If you have any questions, please do not hesitate to contact me. [Sincerely,] : Sincerely, [FreeTextEntry2] : Jeremiah Stovall MD [phone] [place] [phone] [FreeTextEntry3] : Susanna Bui M.D., [facility]  of Medicine  [facility] at [facility]/\[facility] [place] [facility]\""

## 2024-05-21 NOTE — PHYSICAL EXAM
[General Appearance - Alert] : alert [General Appearance - In No Acute Distress] : in no acute distress [Sclera] : the sclera and conjunctiva were normal [Auscultation Breath Sounds / Voice Sounds] : lungs were clear to auscultation bilaterally [Heart Sounds] : normal S1 and S2 [] : no rash [Skin Lesions] : no skin lesions [Motor Exam] : the motor exam was normal [Oriented To Time, Place, And Person] : oriented to person, place, and time [Impaired Insight] : insight and judgment were intact [Nail Clubbing] : no clubbing  or cyanosis of the fingernails [FreeTextEntry1] : No active synovitis;  shoulder abduction maintained on the left ; min  tender over LT medial jonit line with mild tenderness upon forced flexion; appropriate external rotation of the hips,  bilaterally.

## 2024-05-21 NOTE — ASSESSMENT
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   She understands the importance of yearly Ophthalmology screens for visual screen testing to assess for retinal toxicity.  She understands the importance of sun avoidance and the application of SPF protection as well as the use of wide brim hats. Emphasized importance of health screening in the setting of ds DNA Abs in this age group as well as discussed in detail the importance of lowering hemoglobin A1c.  PT continued for b/l shoulders.  Topical anti-inflammatory given for pain relief. 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 Patient will benefit from physical therapy to help with joint mobility and muscle strengthening. IT band strengthening demonstrated and encouraged.  Patient will benefit from physical therapy to help with joint mobility and muscle strengthening.  Quadriceps strengthening exercises demonstrated in the office.  Weight loss has been encouraged to reduce load over the medial joint line. Viscosupplementation has been encouraged to provide additional lubrication and joint support.   Patient will continue with lifestyle and exercise modification to address fatty liver and cardiovascular health.  She will continue on Calcium and VitD at the recommended doses of 1200mg and 800IU daily, respectively.  We reviewed calcium and VitD enriched foods as well. Recommend  bisphosphonate therapy weekly; would consider a drug holiday during mid 2025 period, which would be patient's 5-year lyly of bisphosphonate use.  She is in agreement with the above plan and will return in three month' time.

## 2024-05-21 NOTE — HISTORY OF PRESENT ILLNESS
[FreeTextEntry1] : Patient reports tolerating hydroxychloroquine with alternate day dosing daily.  Recent blood testing reflects a recent bump in hemoglobin A1c otherwise hepatic and renal values within range.  he explains improvement in left shoulder pain while combing hair or reaching for her lower back after cortisone injection.  She notes pain in the LT knee is intermittent with occasional laxity symptoms.   She denies accompanied swelling or paresthesia or recent trauma..  She refrains from NSAIDs tx and takes intermittent Acetaminophen tx.   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  Since initiation of hydroxychloroquine in 2021, patient's double-stranded DNA antibody titers have resolved.  Bone density reflects osteoporosis in the right femoral head at a T score of -2.5. Patient continues on calcium and vitamin D twice daily along with bisphosphate therapy weekly. She otherwise denies recent falls, fractures or dentition loss.   She further denies new systemic symptoms or symptoms of Raynaud's, rash or photosensitivity.

## 2024-05-22 RX ORDER — AZELASTINE HYDROCHLORIDE 137 UG/1
137 SPRAY, METERED NASAL TWICE DAILY
Qty: 1 | Refills: 2 | Status: ACTIVE | COMMUNITY
Start: 2023-01-06 | End: 1900-01-01

## 2024-05-23 ENCOUNTER — APPOINTMENT (OUTPATIENT)
Dept: INTERNAL MEDICINE | Facility: CLINIC | Age: 84
End: 2024-05-23
Payer: MEDICAID

## 2024-05-23 VITALS
RESPIRATION RATE: 16 BRPM | TEMPERATURE: 97 F | DIASTOLIC BLOOD PRESSURE: 70 MMHG | OXYGEN SATURATION: 96 % | SYSTOLIC BLOOD PRESSURE: 108 MMHG | HEART RATE: 84 BPM | HEIGHT: 57 IN | BODY MASS INDEX: 28.26 KG/M2 | WEIGHT: 131 LBS

## 2024-05-23 DIAGNOSIS — K86.1 OTHER CHRONIC PANCREATITIS: ICD-10-CM

## 2024-05-23 DIAGNOSIS — K90.0 CELIAC DISEASE: ICD-10-CM

## 2024-05-23 DIAGNOSIS — E83.42 HYPOMAGNESEMIA: ICD-10-CM

## 2024-05-23 DIAGNOSIS — E61.1 IRON DEFICIENCY: ICD-10-CM

## 2024-05-23 DIAGNOSIS — K86.89 OTHER SPECIFIED DISEASES OF PANCREAS: ICD-10-CM

## 2024-05-23 DIAGNOSIS — R89.4 ABNORMAL IMMUNOLOGICAL FINDINGS IN SPECIMENS FROM OTHER ORGANS, SYSTEMS AND TISSUES: ICD-10-CM

## 2024-05-23 DIAGNOSIS — E11.9 TYPE 2 DIABETES MELLITUS W/OUT COMPLICATIONS: ICD-10-CM

## 2024-05-23 DIAGNOSIS — K57.90 DIVERTICULOSIS OF INTESTINE, PART UNSPECIFIED, W/OUT PERFORATION OR ABSCESS W/OUT BLEEDING: ICD-10-CM

## 2024-05-23 DIAGNOSIS — K76.0 FATTY (CHANGE OF) LIVER, NOT ELSEWHERE CLASSIFIED: ICD-10-CM

## 2024-05-23 DIAGNOSIS — K86.2 CYST OF PANCREAS: ICD-10-CM

## 2024-05-23 DIAGNOSIS — R14.0 ABDOMINAL DISTENSION (GASEOUS): ICD-10-CM

## 2024-05-23 DIAGNOSIS — M32.9 SYSTEMIC LUPUS ERYTHEMATOSUS, UNSPECIFIED: ICD-10-CM

## 2024-05-23 DIAGNOSIS — E73.9 LACTOSE INTOLERANCE, UNSPECIFIED: ICD-10-CM

## 2024-05-23 PROCEDURE — 99214 OFFICE O/P EST MOD 30 MIN: CPT

## 2024-05-23 PROCEDURE — T1013A: CUSTOM

## 2024-05-23 RX ORDER — FAMOTIDINE 20 MG/1
20 TABLET, FILM COATED ORAL
Qty: 90 | Refills: 3 | Status: ACTIVE | COMMUNITY
Start: 2019-11-13 | End: 1900-01-01

## 2024-05-23 RX ORDER — LACTASE 9000 UNIT
9000 TABLET,CHEWABLE ORAL
Qty: 360 | Refills: 3 | Status: ACTIVE | COMMUNITY
Start: 2021-03-14 | End: 1900-01-01

## 2024-05-23 RX ORDER — BACITRACIN ZINC 500 [USP'U]/G
500 OINTMENT TOPICAL
Qty: 28 | Refills: 0 | Status: COMPLETED | COMMUNITY
Start: 2022-06-22 | End: 2024-05-23

## 2024-05-23 NOTE — REASON FOR VISIT
[Follow-up] : a follow-up of an existing diagnosis [[employer] ] : provided by [employer]   [Time Spent: ____ minutes] : Total time spent using  services: [unfilled] minutes. The patient's primary language is not English thus required  services. [Interpreters_IDNumber] : [id number] [Interpreters_FullName] : Radha [TWNoteComboBox1] : Com[place]an

## 2024-05-23 NOTE — PHYSICAL EXAM
[Alert] : alert [Healthy Appearing] : healthy appearing [No Acute Distress] : no acute distress [Normal Lips/Gums] : the lips and gums were normal [Oropharynx] : the oropharynx was normal [Normal Appearance] : the appearance of the neck was normal [Heart Rate And Rhythm] : heart rate was normal and rhythm regular [Normal S1, S2] : normal S1 and S2 [None] : no edema [Soft, Nontender] : the abdomen was soft and nontender [No Mass] : no masses were palpated [No HSM] : no hepatosplenomegaly noted [Cervical Lymph Nodes Enlarged Posterior Bilaterally] : no posterior cervical lymphadenopathy [Cervical Lymph Nodes Enlarged Anterior Bilaterally] : no anterior cervical lymphadenopathy [No CVA Tenderness] : no CVA  tenderness [Abnormal Walk] : normal gait [Normal Scalp] : inspection of the scalp showed no abnormalities [Examination Of The Hair] : texture and distribution of hair was normal [Complexion Medium] : medium complexion [Motor Exam] : the motor exam was normal [Normal] : oriented to person, place, and time

## 2024-05-23 NOTE — HISTORY OF PRESENT ILLNESS
[de-identified] : 9/18 hiatal hernia  gastric erosion and healing ulcers . [FreeTextEntry1] : EXAM: [id number] - MR MRCP WAW IC  - ORDERED BY:  RODOLFO RAZO   PROCEDURE DATE:  04/14/2023    INTERPRETATION:  CLINICAL INFORMATION: Pancreatic duct dilatation  COMPARISON: 2/1/2022, 3/8/2015  CONTRAST/COMPLICATIONS: IV Contrast: Gadavist  7.5 cc administered   0 cc discarded Oral Contrast: NONE Complications: None reported at time of study completion  PROCEDURE: MRI of the abdomen was performed. MRCP was performed.  FINDINGS: Limited exam due to respiratory motion. LOWER CHEST: Within normal limits.  LIVER: Fatty infiltration of the liver. BILE DUCTS: Normal caliber. Common bile duct is normal in caliber measuring 0.5 cm. Low medial insertion of the cystic duct again noted. GALLBLADDER: Within normal limits. SPLEEN: Within normal limits. PANCREAS: Redemonstration of dilatation of the main pancreatic duct in the body and tail measuring up to 0.8 cm. Filling defects within the duct likely represent stones, however evaluation is limited by motion. Pancreatic body and tail are atrophic. There is a 0.8 cm cyst in the uncinate process of the pancreas, unchanged. ADRENALS: Within normal limits. KIDNEYS/URETERS: Tiny cyst in the right kidney.  VISUALIZED PORTIONS: BOWEL: Colonic diverticulosis. PERITONEUM: No ascites. VESSELS: Within normal limits. RETROPERITONEUM/LYMPH NODES: No lymphadenopathy. ABDOMINAL WALL: Within normal limits. BONES: Within normal limits.  IMPRESSION: Motion limited exam.  Evidence of chronic pancreatitis with dilatation of the main pancreatic duct within the body and tail, similar in appearance to prior.

## 2024-05-23 NOTE — ASSESSMENT
[FreeTextEntry1] : 1 dyspepsia discussed Rule of 2's; pt should avoid eating too much; too fast; too spicy; too lousy; less than two hours before bed  -Things to avoid including overeating, spicy foods, tight clothing, eating within three hours of bed, this list is not all inclusive.  -For treatment of reflux, possible options discussed including diet control, H2 blockers, PPIs, as well as coating motility agents discussed as treatment options. Timing of meals and proximity of last meal to sleep were discussed. If symptoms persist, a formal gastrointestinal evaluation is needed.  we discussed long term use of famotidine 2 nsaids    Risks and benefits were discussed and include but not limited to renal damage and GI ulceration and bleeding. They were advised to take with food to limit stomach upset as well as warned to stop the medication if worsening gastric pain or dizziness or other side effects. Also to immediately stop the medication and seek appropriate medical attention if any severe stomach ache, gastritis, black/red vomit, black/red stools or any other medical concern. 3. 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 fatty liver  Fatty Liver: The patient denies any jaundice or pruritus. The patient denies any alcohol use. The patient denies taking large doses of nonsteroidal anti-inflammatory drugs or acetaminophen. The findings are suggestive of fatty liver. The patient and I had a long discussion regarding the risks of fatty liver progressing to cirrhosis. The patient was told of the possible increased risk of developing liver failure, cirrhosis, ascites, GI bleeding secondary to varices, hepatic encephalopathy, bleeding tendencies and liver cancer. The patient was told of the importance of follow-up. The patient was advised to follow up every 6 months for blood work and imaging studies. The patient agreed and will follow up. The patient was advised to lose weight. I recommend a trial of vitamin E supplementation for the fatty liver. If the liver enzymes remain elevated, the patient may require a trial of Pioglitazone for the fatty liver. I recommend avoid alcohol and hepato-toxic agents. The patient was also advised to avoid NSAIDs, Acetaminophen and any other hepatotoxic drugs. The patient was also advised not to share needles, razors, scissors, nail clippers, etc.. The patient is to continue close follow-up in our office for blood work and exams. If the liver enzymes remain elevated, the patient may require a CT guided liver biopsy to assess the liver parenchyma and for possible treatment. We had a long discussion regarding the risks and benefits of the procedure. The patient was told of the risks of bleeding, perforation, infections, emergency surgery and missing lesions. The patient agreed and will follow-up to reassess the symptoms Patient has been counseled on life style interventions, including but not limited to: weight loss (3-5% loss of body weight might improve fat in the liver, while 7-10% needed for potential improvement of other components, including inflammation and scarring of the liver, called fibrosis); healthy diet, avoiding added sugars, sodas, avoiding saturated fats, limiting sodium, avoiding alcohol; and on the importance of regular exercise (> 150 min/week moderate intensity aerobic exercise with at least 2x/week muscle strengthening or exercise as tolerated).  - I explained to patient the natural Hx of NAFLD.  7 .pancreatic cyst   Cystic lesions of the pancreas can be divided pathologically into inflammatory fluid collections, non-neoplastic pancreatic cysts, and pancreatic cystic neoplasms (PCNs). Rarely, solid pancreatic tumors may also present as a pancreatic cyst (eg, islet cell tumor). Most pancreatic cysts are detected incidentally when abdominal imaging is performed for some other indication]. In a study of 341 patients undergoing endoscopic ultrasound for nonpancreatic indications, pancreatic cysts were noted in 9 percent of patients . PCNs account for more than half of pancreatic cysts, even in patients with a history of pancreatitis .   Pancreatic cystic lesions can be an isolated finding, or they can be associated with an underlying disorder such as von Hippel-Lindau disease or polycystic kidney disease. In a study of 158 consecutive patients with von Hippel-Lindau disease, 77 percent had a pancreatic abnormality, including 70 percent with cysts, 9 percent with serous cystadenomas, and 9 percent with neuroendocrine tumors]. (See "Clinical features, diagnosis, and management of von Hippel-Lindau disease".)  Pancreatic cysts are seen in 7 to 10 percent of patients with autosomal dominant polycystic kidney disease. (See "Extrarenal manifestations of autosomal dominant polycystic kidney disease", section on 'Pancreatic cysts'.)  Inflammatory fluid collections  These are not true epithelial cysts and typically represent local complications of acute pancreatitis. Inflammatory fluid collections were previously grouped together under the heading of pancreatic pseudocysts. In 2013, a revision of the Trish classification of acute pancreatitis was published that updated the terminology used to describe pancreatic fluid collections to better reflect the underlying pathophysiology . According to the revised Trish classification, inflammatory fluid collections include acute peripancreatic fluid collections, pseudocysts, acute necrotic collections, and walled-off pancreatic necrosis :  Acute peripancreatic fluid collections occur in the setting of acute interstitial pancreatitis within four weeks of the onset of pancreatitis. They are typically extra-pancreatic and do not have a definable wall. The fluid contains no solid material, and there is no pancreatic necrosis present.    Pseudocysts represent more mature fluid collections that are usually outside the pancreas (though they may be intrapancreatic). They typically develop at least four weeks after acute pancreatitis. They have a well-defined wall, and as is the case with acute peripancreatic fluid collections, there should be no solid material or pancreatic necrosis present. Pseudocysts may also develop following pancreatic trauma.    Acute necrotic collections occur in the setting of necrotizing pancreatitis, may be adjacent to or involve the pancreas, have no definable wall, and may contain both liquid and solid material.    Walled-off pancreatic necrosis is a mature (typically developing at least four weeks after acute pancreatitis), encapsulated collection of pancreatic necrosis that may contain liquid and solid elements (with or without loculation). They may be intra- or extrapancreatic.    It is important to remember that PCNs can sometimes cause acute pancreatitis, or patients presenting with acute pancreatitis may harbor an incidental PCN. As such, all cysts discovered at the time of acute pancreatitis cannot be assumed to be inflammatory. Review of prior imaging if available, clinical presentation and radiological features, and follow-up are important to make this distinction.   NON-NEOPLASTIC PANCREATIC CYSTS Non-neoplastic pancreatic cysts (NNPCs) include a variety of very rare cysts that are often asymptomatic and do not require resection. These include true cysts, retention cysts, mucinous non-neoplastic cysts, and lymphoepithelial cysts. They are typically diagnosed after surgical resection of a lesion that was thought to be a pancreatic cystic neoplasm (PCN) preoperatively.  True cysts  There are only a few cases reported of true cysts or "benign epithelial cysts" of the pancreas. These cystic lesions with a cuboidal epithelial lining have an unclear natural history.  Retention cysts  Retention cysts are small dilated pancreatic duct side branches arising due to obstruction (eg, from pancreatic intraepithelial neoplasia or from inspissated protein debris seen in association with chronic pancreatitis or cystic fibrosis).  Mucinous non-neoplastic cysts  Mucinous non-neoplastic cysts have only recently been described and are exceedingly difficult to differentiate from PCNs. Like PCNs, mucinous non-neoplastic cysts are lined with a mucinous lining, but they lack any neoplastic features (eg, atypia) or ductal communication [8]. These lesions have an unclear natural history.  Lymphoepithelial cysts  Lymphoepithelial cysts (LEC) are rare, benign, and usually asymptomatic cystic lesions. Often described as peripancreatic, these lesions are lined by mature keratinizing squamous epithelium surrounded by a distinct layer of lymphoid tissue [9]. Endoscopic ultrasound with fine needle aspiration (EUS-FNA) may be required to differentiate an LEC from a PCN. FNA cytology usually reveals characteristic epithelial cells and small, mature lymphocytes in a background of keratinaceous debris, anucleate squamous cells, and multinucleated histiocytes [10]. Resection is recommended in symptomatic cases, but not for asymptomatic patients.   PANCREATIC CYSTIC NEOPLASMS Identifying pancreatic cystic neoplasms (PCNs) is important, since some have malignant potential. PCNs are categorized using the WHO histological classification   There are four subtypes of PCNs:  Serous cystic tumors  Mucinous cystic neoplasms (MCNs)  Intraductal papillary mucinous neoplasms (IPMNs)  Solid pseudopapillary neoplasms (SPNs)   The key demographic and clinical features of PCNs are summarized in the table . Each of the subtypes has benign and malignant forms.  The relative frequencies of the different PCNs were examined in a retrospective series of 851 patients undergoing surgical resection for a cystic neoplasm of the pancreas between 1978 and 2011. Intraductal papillary mucinous neoplasms accounted for 38 percent of lesions, mucinous cystic neoplasms for 23 percent, serous cystic tumors for 16 percent, and solid pseudopapillary neoplasms for 3 percent. When only the 376 patients who had surgery between 2005 and 2011 were considered, 49 percent had intraductal papillary mucinous neoplasms, 16 percent had mucinous cystic neoplasms, 12 percent had serous cystic tumors, and 5 percent had solid pseudopapillary neoplasms. However, this series is subject to sampling bias, as it only evaluated resected PCNs. Most branch duct IPMNs and serous cystadenomas do not require resection; thus the relative frequency of these lesions may have been underestimated.  Serous cystic tumors  Most serous cystic tumors are serous cystadenomas, which are benign neoplasms lined by glycogen-rich cells that originate from pancreatic centro-acinar cell. These lesions can arise anywhere in the pancreas and are most commonly diagnosed in women over the age 60 years .  Varieties include microcystic serous cystadenomas, which are composed of multiple small cystic spaces , and oligocystic serous cystadenomas, which are composed of fewer, larger cysts . The oligocystic variant can be difficult to distinguish from an MCN or branch duct IPMN, which can have a similar appearance. Unless symptomatic, these can be followed conservatively since malignant degeneration is exceedingly rare.   Mucinous cystic neoplasms  Mucinous cystic neoplasms (MCNs)  occur almost exclusively in women and are most commonly discovered after the age of 40 years . MCNs exhibit variable cellular atypia and secrete mucin similar to IPMNs . In contrast to IPMNs, MCNs demonstrate ovarian-like stroma, typically arise in the pancreatic tail or body], and do not communicate with the pancreatic

## 2024-05-24 LAB
ALBUMIN SERPL ELPH-MCNC: 4.5 G/DL
ALP BLD-CCNC: 79 U/L
ALT SERPL-CCNC: 11 U/L
ANION GAP SERPL CALC-SCNC: 12 MMOL/L
AST SERPL-CCNC: 15 U/L
BASOPHILS # BLD AUTO: 0.05 K/UL
BASOPHILS NFR BLD AUTO: 0.7 %
BILIRUB DIRECT SERPL-MCNC: 0.1 MG/DL
BILIRUB INDIRECT SERPL-MCNC: 0.1 MG/DL
BILIRUB SERPL-MCNC: 0.2 MG/DL
BUN SERPL-MCNC: 26 MG/DL
CALCIUM SERPL-MCNC: 9.6 MG/DL
CHLORIDE SERPL-SCNC: 104 MMOL/L
CO2 SERPL-SCNC: 25 MMOL/L
CREAT SERPL-MCNC: 0.84 MG/DL
EGFR: 70 ML/MIN/1.73M2
EOSINOPHIL # BLD AUTO: 0.37 K/UL
EOSINOPHIL NFR BLD AUTO: 5.5 %
FERRITIN SERPL-MCNC: 17 NG/ML
FOLATE SERPL-MCNC: 13 NG/ML
GLUCOSE SERPL-MCNC: 69 MG/DL
HCT VFR BLD CALC: 40.2 %
HGB BLD-MCNC: 12.8 G/DL
IMM GRANULOCYTES NFR BLD AUTO: 0.1 %
IRON SATN MFR SERPL: 8 %
IRON SERPL-MCNC: 26 UG/DL
LYMPHOCYTES # BLD AUTO: 1.63 K/UL
LYMPHOCYTES NFR BLD AUTO: 24 %
MAGNESIUM SERPL-MCNC: 2 MG/DL
MAN DIFF?: NORMAL
MCHC RBC-ENTMCNC: 27 PG
MCHC RBC-ENTMCNC: 31.8 GM/DL
MCV RBC AUTO: 84.8 FL
MONOCYTES # BLD AUTO: 0.59 K/UL
MONOCYTES NFR BLD AUTO: 8.7 %
NEUTROPHILS # BLD AUTO: 4.13 K/UL
NEUTROPHILS NFR BLD AUTO: 61 %
PLATELET # BLD AUTO: 263 K/UL
POTASSIUM SERPL-SCNC: 4.7 MMOL/L
PROT SERPL-MCNC: 7 G/DL
RBC # BLD: 4.74 M/UL
RBC # FLD: 13.5 %
SODIUM SERPL-SCNC: 141 MMOL/L
TIBC SERPL-MCNC: 334 UG/DL
UIBC SERPL-MCNC: 308 UG/DL
VIT B12 SERPL-MCNC: 449 PG/ML
WBC # FLD AUTO: 6.78 K/UL

## 2024-05-27 LAB
IGA SER QL IEP: 308 MG/DL
TRYPTASE: 7.1 UG/L
TTG IGA SER IA-ACNC: <1.2 U/ML
TTG IGA SER-ACNC: NEGATIVE
TTG IGG SER IA-ACNC: 5.1 U/ML
TTG IGG SER IA-ACNC: NEGATIVE

## 2024-05-30 LAB — HEMOCCULT STL QL IA: NEGATIVE

## 2024-06-06 ENCOUNTER — APPOINTMENT (OUTPATIENT)
Dept: MRI IMAGING | Facility: CLINIC | Age: 84
End: 2024-06-06

## 2024-07-08 ENCOUNTER — APPOINTMENT (OUTPATIENT)
Dept: ENDOCRINOLOGY | Facility: CLINIC | Age: 84
End: 2024-07-08
Payer: MEDICAID

## 2024-07-08 VITALS
DIASTOLIC BLOOD PRESSURE: 82 MMHG | SYSTOLIC BLOOD PRESSURE: 130 MMHG | HEIGHT: 57 IN | WEIGHT: 130 LBS | RESPIRATION RATE: 16 BRPM | TEMPERATURE: 97.6 F | BODY MASS INDEX: 28.05 KG/M2 | HEART RATE: 91 BPM | OXYGEN SATURATION: 96 %

## 2024-07-08 DIAGNOSIS — E11.9 TYPE 2 DIABETES MELLITUS W/OUT COMPLICATIONS: ICD-10-CM

## 2024-07-08 DIAGNOSIS — M81.0 AGE-RELATED OSTEOPOROSIS W/OUT CURRENT PATHOLOGICAL FRACTURE: ICD-10-CM

## 2024-07-08 LAB — GLUCOSE BLDC GLUCOMTR-MCNC: 193

## 2024-07-08 PROCEDURE — G2211 COMPLEX E/M VISIT ADD ON: CPT | Mod: NC

## 2024-07-08 PROCEDURE — 99214 OFFICE O/P EST MOD 30 MIN: CPT | Mod: 25

## 2024-07-08 PROCEDURE — 82962 GLUCOSE BLOOD TEST: CPT

## 2024-07-11 ENCOUNTER — RX RENEWAL (OUTPATIENT)
Age: 84
End: 2024-07-11

## 2024-08-16 ENCOUNTER — RX RENEWAL (OUTPATIENT)
Age: 84
End: 2024-08-16

## 2024-09-24 ENCOUNTER — APPOINTMENT (OUTPATIENT)
Dept: RHEUMATOLOGY | Facility: CLINIC | Age: 84
End: 2024-09-24
Payer: MEDICAID

## 2024-09-24 VITALS
TEMPERATURE: 97.5 F | WEIGHT: 131 LBS | HEART RATE: 73 BPM | BODY MASS INDEX: 28.26 KG/M2 | DIASTOLIC BLOOD PRESSURE: 85 MMHG | HEIGHT: 57 IN | RESPIRATION RATE: 16 BRPM | OXYGEN SATURATION: 97 % | SYSTOLIC BLOOD PRESSURE: 135 MMHG

## 2024-09-24 DIAGNOSIS — Z87.39 PERSONAL HISTORY OF OTHER DISEASES OF THE MUSCULOSKELETAL SYSTEM AND CONNECTIVE TISSUE: ICD-10-CM

## 2024-09-24 DIAGNOSIS — M85.80 OTHER SPECIFIED DISORDERS OF BONE DENSITY AND STRUCTURE, UNSPECIFIED SITE: ICD-10-CM

## 2024-09-24 DIAGNOSIS — M75.50 BURSITIS OF UNSPECIFIED SHOULDER: ICD-10-CM

## 2024-09-24 DIAGNOSIS — M32.9 SYSTEMIC LUPUS ERYTHEMATOSUS, UNSPECIFIED: ICD-10-CM

## 2024-09-24 DIAGNOSIS — M17.0 BILATERAL PRIMARY OSTEOARTHRITIS OF KNEE: ICD-10-CM

## 2024-09-24 PROCEDURE — G2211 COMPLEX E/M VISIT ADD ON: CPT | Mod: NC

## 2024-09-24 PROCEDURE — 99214 OFFICE O/P EST MOD 30 MIN: CPT

## 2024-09-27 PROBLEM — Z87.39 HISTORY OF OSTEOPOROSIS: Status: RESOLVED | Noted: 2021-07-01 | Resolved: 2024-09-27

## 2024-09-27 NOTE — CONSULT LETTER
[Dear  ___] : Dear  [unfilled], [Consult Letter:] : I had the pleasure of evaluating your patient, [unfilled]. [Please see my note below.] : Please see my note below. [Consult Closing:] : Thank you very much for allowing me to participate in the care of this patient.  If you have any questions, please do not hesitate to contact me. [Sincerely,] : Sincerely, [FreeTextEntry2] : Jeremiah Stovall MD [address] [place] [id number] [FreeTextEntry3] : Susanna Bui M.D., [facility]  of Medicine  [facility] [employer] [facility] [employer] at [facility]

## 2024-09-27 NOTE — ASSESSMENT
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   She understands the importance of yearly Ophthalmology screens for visual screen testing to assess for retinal toxicity.  She understands the importance of sun avoidance and the application of SPF protection as well as the use of wide brim hats. Emphasized importance of health screening in the setting of ds DNA Abs in this age group as well as discussed in detail the importance of lowering hemoglobin A1c and LDL levels.  PT continued for b/l shoulders.  Topical anti-inflammatory given for pain relief. 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 Patient will benefit from physical therapy to help with joint mobility and muscle strengthening. IT band strengthening demonstrated and encouraged.  Patient will benefit from physical therapy to help with joint mobility and muscle strengthening.  Quadriceps strengthening exercises demonstrated in the office.  Weight loss has been encouraged to reduce load over the medial joint line. Viscosupplementation has been encouraged to provide additional lubrication and joint support.   Patient will continue with lifestyle and exercise modification to address fatty liver and cardiovascular health.  She will continue on Calcium and VitD at the recommended doses of 1200mg and 800IU daily, respectively.  We reviewed calcium and VitD enriched foods as well. Recommend bisphosphonate therapy weekly; would consider a drug holiday during mid 2025 period, which would be patient's 5-year lyly of bisphosphonate use.  She is in agreement with the above plan and will return in three month' time.

## 2024-09-27 NOTE — HISTORY OF PRESENT ILLNESS
[FreeTextEntry1] : Patient reports tolerating hydroxychloroquine with alternate day dosing daily.  Recent blood testing reflects improvement of  hemoglobin A1c with mild bump in LDL levels.  Hepatic and renal values within range.  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  She notes pain in the LT knee is intermittent with occasional laxity symptoms.   She denies accompanied swelling or paresthesia or recent trauma..  She refrains from NSAIDs tx and takes intermittent Acetaminophen tx.   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  Since initiation of hydroxychloroquine in 2021, patient's double-stranded DNA antibody titers have resolved.  Bone density improved over the years with transition from OP to osteopenia with T score of -1.9 in the fem neck; prior score of -2.5.  Patient continues on calcium and vitamin D twice daily along with bisphosphate therapy weekly. She otherwise denies recent falls, fractures or dentition loss.   She further denies new systemic symptoms or symptoms of Raynaud's, rash or photosensitivity.

## 2024-10-03 ENCOUNTER — LABORATORY RESULT (OUTPATIENT)
Age: 84
End: 2024-10-03

## 2024-10-03 LAB
ANION GAP SERPL CALC-SCNC: 11 MMOL/L
APPEARANCE: CLEAR
BILIRUBIN URINE: NEGATIVE
BLOOD URINE: NEGATIVE
BUN SERPL-MCNC: 21 MG/DL
CALCIUM SERPL-MCNC: 9.4 MG/DL
CHLORIDE SERPL-SCNC: 102 MMOL/L
CO2 SERPL-SCNC: 26 MMOL/L
COLOR: YELLOW
CREAT SERPL-MCNC: 0.75 MG/DL
CRP SERPL-MCNC: <3 MG/L
EGFR: 79 ML/MIN/1.73M2
ERYTHROCYTE [SEDIMENTATION RATE] IN BLOOD BY WESTERGREN METHOD: 13 MM/HR
GLUCOSE QUALITATIVE U: >=1000 MG/DL
GLUCOSE SERPL-MCNC: 118 MG/DL
KETONES URINE: NEGATIVE MG/DL
LEUKOCYTE ESTERASE URINE: ABNORMAL
NITRITE URINE: NEGATIVE
PH URINE: 5.5
POTASSIUM SERPL-SCNC: 5.1 MMOL/L
PROTEIN URINE: NEGATIVE MG/DL
SODIUM SERPL-SCNC: 139 MMOL/L
SPECIFIC GRAVITY URINE: 1.02
UROBILINOGEN URINE: 0.2 MG/DL

## 2024-10-04 LAB
CREAT SPEC-SCNC: 50 MG/DL
CREAT/PROT UR: 0.1 RATIO
PROT UR-MCNC: 4 MG/DL

## 2024-10-07 ENCOUNTER — APPOINTMENT (OUTPATIENT)
Dept: ENDOCRINOLOGY | Facility: CLINIC | Age: 84
End: 2024-10-07
Payer: MEDICAID

## 2024-10-07 VITALS
RESPIRATION RATE: 16 BRPM | SYSTOLIC BLOOD PRESSURE: 124 MMHG | OXYGEN SATURATION: 98 % | HEIGHT: 57 IN | HEART RATE: 80 BPM | TEMPERATURE: 97.6 F | DIASTOLIC BLOOD PRESSURE: 80 MMHG | BODY MASS INDEX: 27.83 KG/M2 | WEIGHT: 129 LBS

## 2024-10-07 DIAGNOSIS — E78.2 MIXED HYPERLIPIDEMIA: ICD-10-CM

## 2024-10-07 DIAGNOSIS — E11.9 TYPE 2 DIABETES MELLITUS W/OUT COMPLICATIONS: ICD-10-CM

## 2024-10-07 LAB — GLUCOSE BLDC GLUCOMTR-MCNC: 103

## 2024-10-07 PROCEDURE — 82962 GLUCOSE BLOOD TEST: CPT

## 2024-10-07 PROCEDURE — 99214 OFFICE O/P EST MOD 30 MIN: CPT | Mod: 25

## 2024-10-31 ENCOUNTER — RX RENEWAL (OUTPATIENT)
Age: 84
End: 2024-10-31

## 2024-12-10 ENCOUNTER — RX RENEWAL (OUTPATIENT)
Age: 84
End: 2024-12-10

## 2024-12-17 ENCOUNTER — NON-APPOINTMENT (OUTPATIENT)
Age: 84
End: 2024-12-17

## 2024-12-17 ENCOUNTER — APPOINTMENT (OUTPATIENT)
Dept: INTERNAL MEDICINE | Facility: CLINIC | Age: 84
End: 2024-12-17
Payer: MEDICAID

## 2024-12-17 VITALS
DIASTOLIC BLOOD PRESSURE: 56 MMHG | HEART RATE: 86 BPM | BODY MASS INDEX: 3.02 KG/M2 | OXYGEN SATURATION: 98 % | WEIGHT: 14 LBS | TEMPERATURE: 97.3 F | SYSTOLIC BLOOD PRESSURE: 100 MMHG | HEIGHT: 57 IN

## 2024-12-17 DIAGNOSIS — R89.4 ABNORMAL IMMUNOLOGICAL FINDINGS IN SPECIMENS FROM OTHER ORGANS, SYSTEMS AND TISSUES: ICD-10-CM

## 2024-12-17 DIAGNOSIS — K86.2 CYST OF PANCREAS: ICD-10-CM

## 2024-12-17 DIAGNOSIS — K76.0 FATTY (CHANGE OF) LIVER, NOT ELSEWHERE CLASSIFIED: ICD-10-CM

## 2024-12-17 DIAGNOSIS — K86.1 OTHER CHRONIC PANCREATITIS: ICD-10-CM

## 2024-12-17 DIAGNOSIS — K86.89 OTHER SPECIFIED DISEASES OF PANCREAS: ICD-10-CM

## 2024-12-17 DIAGNOSIS — I10 ESSENTIAL (PRIMARY) HYPERTENSION: ICD-10-CM

## 2024-12-17 DIAGNOSIS — D12.6 BENIGN NEOPLASM OF COLON, UNSPECIFIED: ICD-10-CM

## 2024-12-17 DIAGNOSIS — E11.9 TYPE 2 DIABETES MELLITUS W/OUT COMPLICATIONS: ICD-10-CM

## 2024-12-17 DIAGNOSIS — R14.0 ABDOMINAL DISTENSION (GASEOUS): ICD-10-CM

## 2024-12-17 DIAGNOSIS — E61.1 IRON DEFICIENCY: ICD-10-CM

## 2024-12-17 DIAGNOSIS — K90.0 CELIAC DISEASE: ICD-10-CM

## 2024-12-17 PROCEDURE — 99214 OFFICE O/P EST MOD 30 MIN: CPT

## 2024-12-17 PROCEDURE — T1013A: CUSTOM

## 2024-12-19 ENCOUNTER — APPOINTMENT (OUTPATIENT)
Dept: RHEUMATOLOGY | Facility: CLINIC | Age: 84
End: 2024-12-19
Payer: MEDICAID

## 2024-12-19 VITALS
WEIGHT: 133 LBS | RESPIRATION RATE: 16 BRPM | OXYGEN SATURATION: 97 % | DIASTOLIC BLOOD PRESSURE: 70 MMHG | HEIGHT: 57 IN | TEMPERATURE: 98 F | HEART RATE: 79 BPM | SYSTOLIC BLOOD PRESSURE: 106 MMHG | BODY MASS INDEX: 28.69 KG/M2

## 2024-12-19 DIAGNOSIS — M17.0 BILATERAL PRIMARY OSTEOARTHRITIS OF KNEE: ICD-10-CM

## 2024-12-19 DIAGNOSIS — M32.9 SYSTEMIC LUPUS ERYTHEMATOSUS, UNSPECIFIED: ICD-10-CM

## 2024-12-19 DIAGNOSIS — M17.12 UNILATERAL PRIMARY OSTEOARTHRITIS, LEFT KNEE: ICD-10-CM

## 2024-12-19 DIAGNOSIS — M47.26 OTHER SPONDYLOSIS WITH RADICULOPATHY, LUMBAR REGION: ICD-10-CM

## 2024-12-19 LAB
ALBUMIN SERPL ELPH-MCNC: 4.1 G/DL
ALP BLD-CCNC: 72 U/L
ALT SERPL-CCNC: 17 U/L
ANION GAP SERPL CALC-SCNC: 13 MMOL/L
AST SERPL-CCNC: 15 U/L
BASOPHILS # BLD AUTO: 0.03 K/UL
BASOPHILS NFR BLD AUTO: 0.4 %
BILIRUB DIRECT SERPL-MCNC: 0.1 MG/DL
BILIRUB INDIRECT SERPL-MCNC: 0.2 MG/DL
BILIRUB SERPL-MCNC: 0.3 MG/DL
BUN SERPL-MCNC: 25 MG/DL
CALCIUM SERPL-MCNC: 9.7 MG/DL
CANCER AG19-9 SERPL-ACNC: 13 U/ML
CHLORIDE SERPL-SCNC: 104 MMOL/L
CO2 SERPL-SCNC: 25 MMOL/L
CREAT SERPL-MCNC: 0.75 MG/DL
EGFR: 79 ML/MIN/1.73M2
EOSINOPHIL # BLD AUTO: 0.26 K/UL
EOSINOPHIL NFR BLD AUTO: 3.9 %
FERRITIN SERPL-MCNC: 22 NG/ML
GLUCOSE SERPL-MCNC: 88 MG/DL
HCT VFR BLD CALC: 40.4 %
HGB BLD-MCNC: 12.3 G/DL
IGA SER QL IEP: 300 MG/DL
IMM GRANULOCYTES NFR BLD AUTO: 0.3 %
IRON SATN MFR SERPL: 12 %
IRON SERPL-MCNC: 43 UG/DL
LYMPHOCYTES # BLD AUTO: 1.57 K/UL
LYMPHOCYTES NFR BLD AUTO: 23.3 %
MAN DIFF?: NORMAL
MCHC RBC-ENTMCNC: 26.6 PG
MCHC RBC-ENTMCNC: 30.4 G/DL
MCV RBC AUTO: 87.4 FL
MONOCYTES # BLD AUTO: 0.58 K/UL
MONOCYTES NFR BLD AUTO: 8.6 %
NEUTROPHILS # BLD AUTO: 4.27 K/UL
NEUTROPHILS NFR BLD AUTO: 63.5 %
PLATELET # BLD AUTO: 220 K/UL
POTASSIUM SERPL-SCNC: 4.6 MMOL/L
PROT SERPL-MCNC: 6.8 G/DL
RBC # BLD: 4.62 M/UL
RBC # FLD: 15.9 %
SODIUM SERPL-SCNC: 141 MMOL/L
TIBC SERPL-MCNC: 358 UG/DL
TTG IGA SER IA-ACNC: <0.5 U/ML
TTG IGA SER-ACNC: NEGATIVE
TTG IGG SER IA-ACNC: <0.8 U/ML
TTG IGG SER IA-ACNC: NEGATIVE
UIBC SERPL-MCNC: 315 UG/DL
WBC # FLD AUTO: 6.73 K/UL

## 2024-12-19 PROCEDURE — G2211 COMPLEX E/M VISIT ADD ON: CPT | Mod: NC

## 2024-12-19 PROCEDURE — 99214 OFFICE O/P EST MOD 30 MIN: CPT

## 2024-12-31 DIAGNOSIS — D12.6 BENIGN NEOPLASM OF COLON, UNSPECIFIED: ICD-10-CM

## 2025-01-11 ENCOUNTER — RX RENEWAL (OUTPATIENT)
Age: 85
End: 2025-01-11

## 2025-01-25 ENCOUNTER — RX RENEWAL (OUTPATIENT)
Age: 85
End: 2025-01-25

## 2025-01-27 ENCOUNTER — APPOINTMENT (OUTPATIENT)
Dept: INTERNAL MEDICINE | Facility: CLINIC | Age: 85
End: 2025-01-27

## 2025-01-31 ENCOUNTER — APPOINTMENT (OUTPATIENT)
Dept: INTERNAL MEDICINE | Facility: HOSPITAL | Age: 85
End: 2025-01-31

## 2025-02-10 ENCOUNTER — NON-APPOINTMENT (OUTPATIENT)
Age: 85
End: 2025-02-10

## 2025-02-10 ENCOUNTER — APPOINTMENT (OUTPATIENT)
Dept: ENDOCRINOLOGY | Facility: CLINIC | Age: 85
End: 2025-02-10
Payer: MEDICAID

## 2025-02-10 VITALS
BODY MASS INDEX: 28.91 KG/M2 | TEMPERATURE: 97.2 F | WEIGHT: 134 LBS | HEIGHT: 57 IN | HEART RATE: 82 BPM | SYSTOLIC BLOOD PRESSURE: 124 MMHG | OXYGEN SATURATION: 97 % | DIASTOLIC BLOOD PRESSURE: 74 MMHG | RESPIRATION RATE: 16 BRPM

## 2025-02-10 DIAGNOSIS — E11.9 TYPE 2 DIABETES MELLITUS W/OUT COMPLICATIONS: ICD-10-CM

## 2025-02-10 LAB — GLUCOSE BLDC GLUCOMTR-MCNC: 133

## 2025-02-10 PROCEDURE — 99214 OFFICE O/P EST MOD 30 MIN: CPT | Mod: 25

## 2025-02-10 PROCEDURE — 82962 GLUCOSE BLOOD TEST: CPT

## 2025-02-10 NOTE — H&P PST ADULT - PRO PAIN EXPRESSION
Medical Necessity Clause: This procedure was medically necessary because the lesions that were treated were: Detail Level: Detailed Include Z78.9 (Other Specified Conditions Influencing Health Status) As An Associated Diagnosis?: No Show Spray Paint Technique Variable?: Yes Duration Of Freeze Thaw-Cycle (Seconds): 3 Post-Care Instructions: I reviewed with the patient in detail post-care instructions. Patient is to wear sunprotection, and avoid picking at any of the treated lesions. Pt may apply Vaseline to crusted or scabbing areas. Spray Paint Text: The liquid nitrogen was applied to the skin utilizing a spray paint frosting technique. Application Tool (Optional): Liquid Nitrogen Sprayer Consent: The patient's consent was obtained including but not limited to risks of crusting, scabbing, blistering, scarring, darker or lighter pigmentary change, recurrence, incomplete removal and infection. Medical Necessity Information: It is in your best interest to select a reason for this procedure from the list below. All of these items fulfill various CMS LCD requirements except the new and changing color options. Number Of Freeze-Thaw Cycles: 1 freeze-thaw cycle Duration Of Freeze Thaw-Cycle (Seconds): 2 verbalization

## 2025-03-24 ENCOUNTER — RX RENEWAL (OUTPATIENT)
Age: 85
End: 2025-03-24

## 2025-04-01 ENCOUNTER — APPOINTMENT (OUTPATIENT)
Dept: RHEUMATOLOGY | Facility: CLINIC | Age: 85
End: 2025-04-01
Payer: MEDICAID

## 2025-04-01 VITALS
DIASTOLIC BLOOD PRESSURE: 69 MMHG | HEIGHT: 57 IN | OXYGEN SATURATION: 99 % | WEIGHT: 133 LBS | HEART RATE: 79 BPM | BODY MASS INDEX: 28.69 KG/M2 | SYSTOLIC BLOOD PRESSURE: 113 MMHG | RESPIRATION RATE: 16 BRPM | TEMPERATURE: 98 F

## 2025-04-01 DIAGNOSIS — M54.12 RADICULOPATHY, CERVICAL REGION: ICD-10-CM

## 2025-04-01 DIAGNOSIS — M32.9 SYSTEMIC LUPUS ERYTHEMATOSUS, UNSPECIFIED: ICD-10-CM

## 2025-04-01 DIAGNOSIS — M17.12 UNILATERAL PRIMARY OSTEOARTHRITIS, LEFT KNEE: ICD-10-CM

## 2025-04-01 DIAGNOSIS — S16.1XXA STRAIN OF MUSCLE, FASCIA AND TENDON AT NECK LEVEL, INITIAL ENCOUNTER: ICD-10-CM

## 2025-04-01 PROCEDURE — T1013A: CUSTOM

## 2025-04-01 PROCEDURE — G2211 COMPLEX E/M VISIT ADD ON: CPT | Mod: NC

## 2025-04-01 PROCEDURE — 99214 OFFICE O/P EST MOD 30 MIN: CPT

## 2025-04-01 RX ORDER — CYCLOBENZAPRINE HYDROCHLORIDE 5 MG/1
5 TABLET, FILM COATED ORAL
Qty: 30 | Refills: 0 | Status: ACTIVE | COMMUNITY
Start: 2025-04-01 | End: 1900-01-01

## 2025-06-04 ENCOUNTER — RX RENEWAL (OUTPATIENT)
Age: 85
End: 2025-06-04

## 2025-06-11 ENCOUNTER — RX RENEWAL (OUTPATIENT)
Age: 85
End: 2025-06-11

## 2025-06-30 ENCOUNTER — APPOINTMENT (OUTPATIENT)
Dept: ENDOCRINOLOGY | Facility: CLINIC | Age: 85
End: 2025-06-30
Payer: MEDICARE

## 2025-06-30 ENCOUNTER — NON-APPOINTMENT (OUTPATIENT)
Age: 85
End: 2025-06-30

## 2025-06-30 VITALS
HEART RATE: 77 BPM | TEMPERATURE: 97 F | WEIGHT: 133 LBS | OXYGEN SATURATION: 99 % | BODY MASS INDEX: 28.69 KG/M2 | DIASTOLIC BLOOD PRESSURE: 74 MMHG | RESPIRATION RATE: 16 BRPM | HEIGHT: 57 IN | SYSTOLIC BLOOD PRESSURE: 116 MMHG

## 2025-06-30 LAB — GLUCOSE BLDC GLUCOMTR-MCNC: 130

## 2025-06-30 PROCEDURE — 99204 OFFICE O/P NEW MOD 45 MIN: CPT | Mod: 25

## 2025-06-30 PROCEDURE — 82962 GLUCOSE BLOOD TEST: CPT

## 2025-06-30 RX ORDER — SITAGLIPTIN AND METFORMIN HYDROCHLORIDE 50; 1000 MG/1; MG/1
50-1000 TABLET, FILM COATED ORAL
Qty: 180 | Refills: 3 | Status: ACTIVE | COMMUNITY
Start: 2025-06-30

## 2025-07-01 ENCOUNTER — APPOINTMENT (OUTPATIENT)
Dept: RHEUMATOLOGY | Facility: CLINIC | Age: 85
End: 2025-07-01
Payer: MEDICARE

## 2025-07-01 VITALS
WEIGHT: 134 LBS | DIASTOLIC BLOOD PRESSURE: 67 MMHG | SYSTOLIC BLOOD PRESSURE: 103 MMHG | TEMPERATURE: 98 F | BODY MASS INDEX: 28.91 KG/M2 | OXYGEN SATURATION: 98 % | HEIGHT: 57 IN | HEART RATE: 77 BPM | RESPIRATION RATE: 16 BRPM

## 2025-07-01 PROCEDURE — G2211 COMPLEX E/M VISIT ADD ON: CPT

## 2025-07-01 PROCEDURE — 99204 OFFICE O/P NEW MOD 45 MIN: CPT

## 2025-07-01 PROCEDURE — 99214 OFFICE O/P EST MOD 30 MIN: CPT

## 2025-07-09 PROBLEM — M19.049 OSTEOARTHRITIS, HAND, PRIMARY LOCALIZED: Status: ACTIVE | Noted: 2025-07-09

## 2025-08-27 ENCOUNTER — RX RENEWAL (OUTPATIENT)
Age: 85
End: 2025-08-27